# Patient Record
Sex: MALE | Race: WHITE | NOT HISPANIC OR LATINO | Employment: OTHER | URBAN - NONMETROPOLITAN AREA
[De-identification: names, ages, dates, MRNs, and addresses within clinical notes are randomized per-mention and may not be internally consistent; named-entity substitution may affect disease eponyms.]

---

## 2017-03-15 ENCOUNTER — PROCEDURE VISIT (OUTPATIENT)
Dept: OTOLARYNGOLOGY | Facility: CLINIC | Age: 77
End: 2017-03-15

## 2017-03-15 DIAGNOSIS — IMO0001 ASYMMETRICAL HEARING LOSS, RIGHT: Primary | ICD-10-CM

## 2017-03-15 PROCEDURE — HEARINGNOCHG: Performed by: AUDIOLOGIST

## 2017-03-15 NOTE — PROGRESS NOTES
Mr. Asif presented to the clinic this date for a BAHA fitting. He was fit with a right BAHA 5 [employer] replacement processor.

## 2017-05-11 ENCOUNTER — TRANSCRIBE ORDERS (OUTPATIENT)
Dept: ADMINISTRATIVE | Facility: HOSPITAL | Age: 77
End: 2017-05-11

## 2017-05-11 ENCOUNTER — HOSPITAL ENCOUNTER (OUTPATIENT)
Dept: GENERAL RADIOLOGY | Facility: HOSPITAL | Age: 77
Discharge: HOME | End: 2017-05-11
Attending: INTERNAL MEDICINE | Admitting: INTERNAL MEDICINE

## 2017-05-11 DIAGNOSIS — Z87.891 HISTORY OF TOBACCO USE: Primary | ICD-10-CM

## 2017-05-11 PROCEDURE — 71020 HC CHEST PA AND LATERAL: CPT

## 2017-06-15 LAB
ALBUMIN SERPL-MCNC: 4.2 G/DL (ref 3.5–5.2)
ALP BLD-CCNC: 88 U/L (ref 40–130)
ALT SERPL-CCNC: 12 U/L (ref 5–41)
ANION GAP SERPL CALCULATED.3IONS-SCNC: 16 MMOL/L (ref 7–19)
AST SERPL-CCNC: 21 U/L (ref 5–40)
BILIRUB SERPL-MCNC: 1.1 MG/DL (ref 0.2–1.2)
BUN BLDV-MCNC: 17 MG/DL (ref 8–23)
CALCIUM SERPL-MCNC: 9.4 MG/DL (ref 8.8–10.2)
CHLORIDE BLD-SCNC: 103 MMOL/L (ref 98–111)
CO2: 24 MMOL/L (ref 22–29)
CREAT SERPL-MCNC: 1.1 MG/DL (ref 0.5–1.2)
GFR NON-AFRICAN AMERICAN: >60
GLUCOSE BLD-MCNC: 91 MG/DL (ref 74–109)
HBA1C MFR BLD: 4.4 %
LDL CHOLESTEROL DIRECT: 52 MG/DL
POTASSIUM SERPL-SCNC: 3.9 MMOL/L (ref 3.5–5)
SODIUM BLD-SCNC: 143 MMOL/L (ref 136–145)
TOTAL PROTEIN: 7.4 G/DL (ref 6.6–8.7)
TSH SERPL DL<=0.05 MIU/L-ACNC: 0.75 UIU/ML (ref 0.27–4.2)

## 2017-06-19 RX ORDER — TIZANIDINE 2 MG/1
TABLET ORAL
Qty: 30 TABLET | Refills: 1 | Status: SHIPPED | OUTPATIENT
Start: 2017-06-19 | End: 2017-06-22

## 2017-06-21 RX ORDER — M-VIT,TX,IRON,MINS/CALC/FOLIC 27MG-0.4MG
1 TABLET ORAL DAILY
COMMUNITY
End: 2017-10-23

## 2017-06-21 RX ORDER — ACETAMINOPHEN AND CODEINE PHOSPHATE 300; 30 MG/1; MG/1
1 TABLET ORAL 3 TIMES DAILY PRN
COMMUNITY
End: 2017-06-22 | Stop reason: SDUPTHER

## 2017-06-21 RX ORDER — UREA 10 %
1 LOTION (ML) TOPICAL
COMMUNITY

## 2017-06-21 RX ORDER — LEVOTHYROXINE SODIUM 0.12 MG/1
TABLET ORAL
Refills: 3 | COMMUNITY
Start: 2017-06-02 | End: 2018-02-21 | Stop reason: SDUPTHER

## 2017-06-21 RX ORDER — SIMVASTATIN 20 MG
TABLET ORAL
Refills: 1 | COMMUNITY
Start: 2017-06-02 | End: 2017-08-31 | Stop reason: SDUPTHER

## 2017-06-22 ENCOUNTER — OFFICE VISIT (OUTPATIENT)
Dept: INTERNAL MEDICINE | Age: 77
End: 2017-06-22
Payer: MEDICARE

## 2017-06-22 VITALS
SYSTOLIC BLOOD PRESSURE: 116 MMHG | HEIGHT: 74 IN | OXYGEN SATURATION: 95 % | HEART RATE: 87 BPM | DIASTOLIC BLOOD PRESSURE: 66 MMHG | BODY MASS INDEX: 22.59 KG/M2 | WEIGHT: 176 LBS

## 2017-06-22 DIAGNOSIS — C73 THYROID CANCER (HCC): ICD-10-CM

## 2017-06-22 DIAGNOSIS — J43.9 PULMONARY EMPHYSEMA, UNSPECIFIED EMPHYSEMA TYPE (HCC): ICD-10-CM

## 2017-06-22 DIAGNOSIS — E89.0 HYPOTHYROIDISM, POSTOP: ICD-10-CM

## 2017-06-22 DIAGNOSIS — I25.10 CORONARY ARTERY DISEASE INVOLVING NATIVE CORONARY ARTERY OF NATIVE HEART WITHOUT ANGINA PECTORIS: ICD-10-CM

## 2017-06-22 DIAGNOSIS — D46.9 MYELODYSPLASIA (MYELODYSPLASTIC SYNDROME) (HCC): ICD-10-CM

## 2017-06-22 PROCEDURE — 99214 OFFICE O/P EST MOD 30 MIN: CPT | Performed by: INTERNAL MEDICINE

## 2017-06-22 RX ORDER — ACETAMINOPHEN AND CODEINE PHOSPHATE 300; 30 MG/1; MG/1
1 TABLET ORAL 3 TIMES DAILY PRN
Qty: 90 TABLET | Refills: 0 | Status: SHIPPED | OUTPATIENT
Start: 2017-06-22 | End: 2018-04-25 | Stop reason: SDUPTHER

## 2017-06-22 ASSESSMENT — PATIENT HEALTH QUESTIONNAIRE - PHQ9
SUM OF ALL RESPONSES TO PHQ9 QUESTIONS 1 & 2: 0
2. FEELING DOWN, DEPRESSED OR HOPELESS: 0
1. LITTLE INTEREST OR PLEASURE IN DOING THINGS: 0
SUM OF ALL RESPONSES TO PHQ QUESTIONS 1-9: 0

## 2017-06-22 ASSESSMENT — ENCOUNTER SYMPTOMS
TROUBLE SWALLOWING: 0
RHINORRHEA: 0
SORE THROAT: 0
SHORTNESS OF BREATH: 1
NAUSEA: 0
ABDOMINAL PAIN: 0
COUGH: 0

## 2017-09-12 ENCOUNTER — APPOINTMENT (OUTPATIENT)
Dept: PREADMISSION TESTING | Facility: HOSPITAL | Age: 77
End: 2017-09-12

## 2017-09-12 VITALS
BODY MASS INDEX: 24.08 KG/M2 | HEART RATE: 72 BPM | WEIGHT: 177.8 LBS | RESPIRATION RATE: 18 BRPM | DIASTOLIC BLOOD PRESSURE: 82 MMHG | TEMPERATURE: 98.7 F | HEIGHT: 72 IN | OXYGEN SATURATION: 96 % | SYSTOLIC BLOOD PRESSURE: 154 MMHG

## 2017-09-12 LAB
ANION GAP SERPL CALCULATED.3IONS-SCNC: 8 MMOL/L (ref 4–13)
BUN BLD-MCNC: 14 MG/DL (ref 5–21)
BUN/CREAT SERPL: 12.7 (ref 7–25)
CALCIUM SPEC-SCNC: 9.4 MG/DL (ref 8.4–10.4)
CHLORIDE SERPL-SCNC: 105 MMOL/L (ref 98–110)
CO2 SERPL-SCNC: 28 MMOL/L (ref 24–31)
CREAT BLD-MCNC: 1.1 MG/DL (ref 0.5–1.4)
DEPRECATED RDW RBC AUTO: 91.1 FL (ref 40–54)
ERYTHROCYTE [DISTWIDTH] IN BLOOD BY AUTOMATED COUNT: 22.7 % (ref 12–15)
GFR SERPL CREATININE-BSD FRML MDRD: 65 ML/MIN/1.73
GLUCOSE BLD-MCNC: 88 MG/DL (ref 70–100)
HCT VFR BLD AUTO: 32.8 % (ref 40–52)
HGB BLD-MCNC: 10.3 G/DL (ref 14–18)
MCH RBC QN AUTO: 34.8 PG (ref 28–32)
MCHC RBC AUTO-ENTMCNC: 31.4 G/DL (ref 33–36)
MCV RBC AUTO: 110.8 FL (ref 82–95)
PLATELET # BLD AUTO: 125 10*3/MM3 (ref 130–400)
POTASSIUM BLD-SCNC: 5.3 MMOL/L (ref 3.5–5.3)
RBC # BLD AUTO: 2.96 10*6/MM3 (ref 4.8–5.9)
SODIUM BLD-SCNC: 141 MMOL/L (ref 135–145)
WBC NRBC COR # BLD: 6.48 10*3/MM3 (ref 4.8–10.8)

## 2017-09-12 PROCEDURE — 80048 BASIC METABOLIC PNL TOTAL CA: CPT | Performed by: ANESTHESIOLOGY

## 2017-09-12 PROCEDURE — 85027 COMPLETE CBC AUTOMATED: CPT | Performed by: ANESTHESIOLOGY

## 2017-09-12 PROCEDURE — 36415 COLL VENOUS BLD VENIPUNCTURE: CPT

## 2017-09-12 PROCEDURE — 93010 ELECTROCARDIOGRAM REPORT: CPT | Performed by: INTERNAL MEDICINE

## 2017-09-12 PROCEDURE — 93005 ELECTROCARDIOGRAM TRACING: CPT

## 2017-09-12 RX ORDER — SIMVASTATIN 10 MG
10 TABLET ORAL NIGHTLY
COMMUNITY
End: 2018-11-30

## 2017-09-12 RX ORDER — ASPIRIN 81 MG/1
81 TABLET ORAL DAILY
COMMUNITY

## 2017-09-12 RX ORDER — LEVOTHYROXINE SODIUM 0.12 MG/1
125 TABLET ORAL DAILY
COMMUNITY

## 2017-09-12 NOTE — DISCHARGE INSTRUCTIONS
DAY OF SURGERY INSTRUCTIONS        YOUR SURGEON: Laly    PROCEDURE: cataract extraction     DATE OF SURGERY: 9-28-17    ARRIVAL TIME: AS DIRECTED BY OFFICE    DAY OF SURGERY TAKE ONLY THESE MEDICATIONS: NONE            BEFORE YOU COME TO THE HOSPITAL  (Pre-op instructions)  • Do not eat, drink, smoke or chew gum after midnight the night before surgery.  This also includes no mints.  • Morning of surgery take only the medicines you have been instructed with a sip of water unless otherwise instructed  by your physician.  • Do not shave, wear makeup or dark nail polish.  • Remove all jewelry including rings.  • Leave anything you consider valuable at home.  • Leave your suitcase in the car until after your surgery.  • Bring the following with you if applicable:  o Picture ID and insurance, Medicare or Medicaid cards  o Co-pay/deductible required by insurance (cash, check, credit card)  o Copy of advance directive, living will or power-of- documents if not brought to PAT  o CPAP or BIPAP mask and tubing  o Relaxation aids (MP3 player, book, magazine)  • On the day of surgery check in at registration located at the main entrance of the hospital.       Outpatient Surgery Guidelines, Adult  Outpatient procedures are those for which the person having the procedure is allowed to go home the same day as the procedure. Various procedures are done on an outpatient basis. You should follow some general guidelines if you will be having an outpatient procedure.  LET YOUR HEALTH CARE PROVIDER KNOW ABOUT:  · Any allergies you have.  · All medicines you are taking, including vitamins, herbs, eye drops, creams, and over-the-counter medicines.  · Previous problems you or members of your family have had with the use of anesthetics.  · Any blood disorders you have.  · Previous surgeries you have had.  · Medical conditions you have.  RISKS AND COMPLICATIONS  Your health care provider will discuss possible risks and  complications with you before surgery. Common risks and complications include:    · Problems due to the use of anesthetics.  · Blood loss and replacement (does not apply to minor surgical procedures).  · Temporary increase in pain due to surgery.  · Uncorrected pain or problems that the surgery was meant to correct.  · Infection.  · New damage.  BEFORE THE PROCEDURE  · Ask your health care provider about changing or stopping your regular medicines. You may need to stop taking certain medicines in the days or weeks before the procedure.  · Stop smoking at least 2 weeks before surgery. This lowers your risk for complications during and after surgery. Ask your health care provider for help with this if needed.  · Eat your usual meals and a light supper the day before surgery. Continue fluid intake. Do not drink alcohol.  · Do not eat or drink after midnight the night before your surgery.   · Arrange for someone to take you home and to stay with you for 24 hours after the procedure. Medicine given for your procedure may affect your ability to drive or to care for yourself.  · Call your health care provider's office if you develop an illness or problem that may prevent you from safely having your procedure.  AFTER THE PROCEDURE  After surgery, you will be taken to a recovery area, where your progress will be monitored. If there are no complications, you will be allowed to go home when you are awake, stable, and taking fluids well. You may have numbness around the surgical site. Healing will take some time. You will have tenderness at the surgical site and may have some swelling and bruising. You may also have some nausea.  HOME CARE INSTRUCTIONS  · Do not drive for 24 hours, or as directed by your health care provider. Do not drive while taking prescription pain medicines.  · Do not drink alcohol for 24 hours.  · Do not make important decisions or sign legal documents for 24 hours.  · You may resume a normal diet and  activities as directed.  · Do not lift anything heavier than 10 pounds (4.5 kg) or play contact sports until your health care provider says it is okay.  · Change your bandages (dressings) as directed.  · Only take over-the-counter or prescription medicines as directed by your health care provider.  · Follow up with your health care provider as directed.  SEEK MEDICAL CARE IF:  · You have increased bleeding (more than a small spot) from the surgical site.  · You have redness, swelling, or increasing pain in the wound.  · You see pus coming from the wound.  · You have a fever.  · You notice a bad smell coming from the wound or dressing.  · You feel lightheaded or faint.  · You develop a rash.  · You have trouble breathing.  · You develop allergies.  MAKE SURE YOU:  · Understand these instructions.  · Will watch your condition.  · Will get help right away if you are not doing well or get worse.     This information is not intended to replace advice given to you by your health care provider. Make sure you discuss any questions you have with your health care provider.     Document Released: 09/12/2002 Document Revised: 05/03/2016 Document Reviewed: 05/22/2014  [employer] Interactive Patient Education ©2016 [employer]       Fall Prevention in Hospitals, Adult  As a hospital patient, your condition and the treatments you receive can increase your risk for falls. Some additional risk factors for falls in a hospital include:  · Being in an unfamiliar environment.  · Being on bed rest.  · Your surgery.  · Taking certain medicines.  · Your tubing requirements, such as intravenous (IV) therapy or catheters.  It is important that you learn how to decrease fall risks while at the hospital. Below are important tips that can help prevent falls.  SAFETY TIPS FOR PREVENTING FALLS  Talk about your risk of falling.  · Ask your health care provider why you are at risk for falling. Is it your medicine, illness, tubing placement, or  something else?  · Make a plan with your health care provider to keep you safe from falls.  · Ask your health care provider or pharmacist about side effects of your medicines. Some medicines can make you dizzy or affect your coordination.  Ask for help.  · Ask for help before getting out of bed. You may need to press your call button.  · Ask for assistance in getting safely to the toilet.  · Ask for a walker or cane to be put at your bedside. Ask that most of the side rails on your bed be placed up before your health care provider leaves the room.  · Ask family or friends to sit with you.  · Ask for things that are out of your reach, such as your glasses, hearing aids, telephone, bedside table, or call button.  Follow these tips to avoid falling:  · Stay lying or seated, rather than standing, while waiting for help.  · Wear rubber-soled slippers or shoes whenever you walk in the hospital.  · Avoid quick, sudden movements.  ¨ Change positions slowly.  ¨ Sit on the side of your bed before standing.  ¨ Stand up slowly and wait before you start to walk.  · Let your health care provider know if there is a spill on the floor.  · Pay careful attention to the medical equipment, electrical cords, and tubes around you.  · When you need help, use your call button by your bed or in the bathroom. Wait for one of your health care providers to help you.  · If you feel dizzy or unsure of your footing, return to bed and wait for assistance.  · Avoid being distracted by the TV, telephone, or another person in your room.  · Do not lean or support yourself on rolling objects, such as IV poles or bedside tables.     This information is not intended to replace advice given to you by your health care provider. Make sure you discuss any questions you have with your health care provider.     Document Released: 12/15/2001 Document Revised: 01/08/2016 Document Reviewed: 08/25/2013  Elsevier Interactive Patient Education ©2016 [employer]vier  Inc.       Surgical Site Infections FAQs  What is a Surgical Site Infection (SSI)?  A surgical site infection is an infection that occurs after surgery in the part of the body where the surgery took place. Most patients who have surgery do not develop an infection. However, infections develop in about 1 to 3 out of every 100 patients who have surgery.  Some of the common symptoms of a surgical site infection are:  · Redness and pain around the area where you had surgery  · Drainage of cloudy fluid from your surgical wound  · Fever  Can SSIs be treated?  Yes. Most surgical site infections can be treated with antibiotics. The antibiotic given to you depends on the bacteria (germs) causing the infection. Sometimes patients with SSIs also need another surgery to treat the infection.  What are some of the things that hospitals are doing to prevent SSIs?  To prevent SSIs, doctors, nurses, and other healthcare providers:  · Clean their hands and arms up to their elbows with an antiseptic agent just before the surgery.  · Clean their hands with soap and water or an alcohol-based hand rub before and after caring for each patient.  · May remove some of your hair immediately before your surgery using electric clippers if the hair is in the same area where the procedure will occur. They should not shave you with a razor.  · Wear special hair covers, masks, gowns, and gloves during surgery to keep the surgery area clean.  · Give you antibiotics before your surgery starts. In most cases, you should get antibiotics within 60 minutes before the surgery starts and the antibiotics should be stopped within 24 hours after surgery.  · Clean the skin at the site of your surgery with a special soap that kills germs.  What can I do to help prevent SSIs?  Before your surgery:  · Tell your doctor about other medical problems you may have. Health problems such as allergies, diabetes, and obesity could affect your surgery and your  treatment.  · Quit smoking. Patients who smoke get more infections. Talk to your doctor about how you can quit before your surgery.  · Do not shave near where you will have surgery. Shaving with a razor can irritate your skin and make it easier to develop an infection.  At the time of your surgery:  · Speak up if someone tries to shave you with a razor before surgery. Ask why you need to be shaved and talk with your surgeon if you have any concerns.  · Ask if you will get antibiotics before surgery.  After your surgery:  · Make sure that your healthcare providers clean their hands before examining you, either with soap and water or an alcohol-based hand rub.  · If you do not see your providers clean their hands, please ask them to do so.  · Family and friends who visit you should not touch the surgical wound or dressings.  · Family and friends should clean their hands with soap and water or an alcohol-based hand rub before and after visiting you. If you do not see them clean their hands, ask them to clean their hands.  What do I need to do when I go home from the hospital?  · Before you go home, your doctor or nurse should explain everything you need to know about taking care of your wound. Make sure you understand how to care for your wound before you leave the hospital.  · Always clean your hands before and after caring for your wound.  · Before you go home, make sure you know who to contact if you have questions or problems after you get home.  · If you have any symptoms of an infection, such as redness and pain at the surgery site, drainage, or fever, call your doctor immediately.  If you have additional questions, please ask your doctor or nurse.  Developed and co-sponsored by The Society for Healthcare Epidemiology of [place] (SHEA); Infectious Diseases Society of [place] (IDSA); American Hospital Association; Association for Professionals in Infection Control and Epidemiology (APIC); Centers for Disease  Control and Prevention (CDC); and The Joint Commission.     This information is not intended to replace advice given to you by your health care provider. Make sure you discuss any questions you have with your health care provider.     Document Released: 12/23/2014 Document Revised: 01/08/2016 Document Reviewed: 03/02/2016  Elsevier Interactive Patient Education ©2016 Elsevier Inc.

## 2017-09-28 PROBLEM — D46.9 MDS (MYELODYSPLASTIC SYNDROME) (HCC): Status: ACTIVE | Noted: 2017-09-28

## 2017-09-29 ENCOUNTER — INFUSION (OUTPATIENT)
Dept: ONCOLOGY | Facility: HOSPITAL | Age: 77
End: 2017-09-29

## 2017-09-29 VITALS
WEIGHT: 176 LBS | SYSTOLIC BLOOD PRESSURE: 153 MMHG | OXYGEN SATURATION: 99 % | DIASTOLIC BLOOD PRESSURE: 80 MMHG | HEIGHT: 71 IN | BODY MASS INDEX: 24.64 KG/M2 | HEART RATE: 77 BPM | TEMPERATURE: 97.6 F | RESPIRATION RATE: 20 BRPM

## 2017-09-29 DIAGNOSIS — D46.9 MDS (MYELODYSPLASTIC SYNDROME) (HCC): Primary | ICD-10-CM

## 2017-09-29 PROCEDURE — 96372 THER/PROPH/DIAG INJ SC/IM: CPT

## 2017-09-29 PROCEDURE — 63510000001 EPOETIN ALFA PER 1000 UNITS: Performed by: INTERNAL MEDICINE

## 2017-09-29 RX ADMIN — ERYTHROPOIETIN 40000 UNITS: 40000 INJECTION, SOLUTION INTRAVENOUS; SUBCUTANEOUS at 12:29

## 2017-10-06 ENCOUNTER — INFUSION (OUTPATIENT)
Dept: ONCOLOGY | Facility: HOSPITAL | Age: 77
End: 2017-10-06

## 2017-10-06 ENCOUNTER — TRANSCRIBE ORDERS (OUTPATIENT)
Dept: ADMINISTRATIVE | Facility: HOSPITAL | Age: 77
End: 2017-10-06

## 2017-10-06 ENCOUNTER — APPOINTMENT (OUTPATIENT)
Dept: LAB | Facility: HOSPITAL | Age: 77
End: 2017-10-06

## 2017-10-06 VITALS
BODY MASS INDEX: 24.64 KG/M2 | DIASTOLIC BLOOD PRESSURE: 75 MMHG | RESPIRATION RATE: 18 BRPM | OXYGEN SATURATION: 98 % | WEIGHT: 176 LBS | HEART RATE: 79 BPM | TEMPERATURE: 97 F | HEIGHT: 71 IN | SYSTOLIC BLOOD PRESSURE: 148 MMHG

## 2017-10-06 DIAGNOSIS — D46.9 MDS (MYELODYSPLASTIC SYNDROME) (HCC): ICD-10-CM

## 2017-10-06 DIAGNOSIS — D53.9 MACROCYTIC ANEMIA: Primary | ICD-10-CM

## 2017-10-06 DIAGNOSIS — D46.9 MDS (MYELODYSPLASTIC SYNDROME) (HCC): Primary | ICD-10-CM

## 2017-10-06 LAB
ERYTHROCYTE [DISTWIDTH] IN BLOOD BY AUTOMATED COUNT: 23.8 % (ref 12–15)
HCT VFR BLD AUTO: 35.2 % (ref 40–52)
HGB BLD-MCNC: 11.4 G/DL (ref 14–18)
MCH RBC QN AUTO: 35.1 PG (ref 28–32)
MCHC RBC AUTO-ENTMCNC: 32.4 G/DL (ref 33–36)
MCV RBC AUTO: 108.3 FL (ref 82–95)
PLATELET # BLD AUTO: 132 10*3/MM3 (ref 130–400)
PMV BLD AUTO: 12.9 FL (ref 6–12)
RBC # BLD AUTO: 3.25 10*6/MM3 (ref 4.2–5.4)
WBC NRBC COR # BLD: 6.7 10*3/MM3 (ref 4.8–10.8)

## 2017-10-06 PROCEDURE — 85027 COMPLETE CBC AUTOMATED: CPT | Performed by: INTERNAL MEDICINE

## 2017-10-06 PROCEDURE — 96372 THER/PROPH/DIAG INJ SC/IM: CPT

## 2017-10-06 PROCEDURE — 36415 COLL VENOUS BLD VENIPUNCTURE: CPT

## 2017-10-06 PROCEDURE — 63510000001 EPOETIN ALFA PER 1000 UNITS: Performed by: INTERNAL MEDICINE

## 2017-10-06 RX ADMIN — ERYTHROPOIETIN 40000 UNITS: 40000 INJECTION, SOLUTION INTRAVENOUS; SUBCUTANEOUS at 13:30

## 2017-10-13 ENCOUNTER — APPOINTMENT (OUTPATIENT)
Dept: LAB | Facility: HOSPITAL | Age: 77
End: 2017-10-13

## 2017-10-13 ENCOUNTER — INFUSION (OUTPATIENT)
Dept: ONCOLOGY | Facility: HOSPITAL | Age: 77
End: 2017-10-13

## 2017-10-13 VITALS
SYSTOLIC BLOOD PRESSURE: 157 MMHG | HEART RATE: 75 BPM | DIASTOLIC BLOOD PRESSURE: 82 MMHG | TEMPERATURE: 96.8 F | HEIGHT: 71 IN | RESPIRATION RATE: 20 BRPM | WEIGHT: 178 LBS | BODY MASS INDEX: 24.92 KG/M2 | OXYGEN SATURATION: 98 %

## 2017-10-13 DIAGNOSIS — D46.9 MDS (MYELODYSPLASTIC SYNDROME) (HCC): ICD-10-CM

## 2017-10-13 DIAGNOSIS — D53.9 SIMPLE CHRONIC ANEMIA: Primary | ICD-10-CM

## 2017-10-13 LAB
AUTO MIXED CELLS #: 0.7 10*3/MM3 (ref 0.1–2.6)
AUTO MIXED CELLS %: 11.4 % (ref 0.1–24)
ERYTHROCYTE [DISTWIDTH] IN BLOOD BY AUTOMATED COUNT: 24 % (ref 12–15)
HCT VFR BLD AUTO: 32 % (ref 40–52)
HGB BLD-MCNC: 10.3 G/DL (ref 14–18)
LYMPHOCYTES # BLD AUTO: 2.8 10*3/MM3 (ref 0.8–7)
LYMPHOCYTES NFR BLD AUTO: 43.8 % (ref 15–45)
MCH RBC QN AUTO: 34.8 PG (ref 28–32)
MCHC RBC AUTO-ENTMCNC: 32.2 G/DL (ref 33–36)
MCV RBC AUTO: 108.1 FL (ref 82–95)
NEUTROPHILS # BLD AUTO: 3 10*3/MM3 (ref 1.5–8.3)
NEUTROPHILS NFR BLD AUTO: 44.8 % (ref 39–78)
PLATELET # BLD AUTO: 98 10*3/MM3 (ref 130–400)
RBC # BLD AUTO: 2.96 10*6/MM3 (ref 4.2–5.4)
WBC NRBC COR # BLD: 6.5 10*3/MM3 (ref 4.8–10.8)

## 2017-10-13 PROCEDURE — 96372 THER/PROPH/DIAG INJ SC/IM: CPT

## 2017-10-13 PROCEDURE — 63510000001 EPOETIN ALFA PER 1000 UNITS: Performed by: INTERNAL MEDICINE

## 2017-10-13 PROCEDURE — 85025 COMPLETE CBC W/AUTO DIFF WBC: CPT | Performed by: INTERNAL MEDICINE

## 2017-10-13 PROCEDURE — 36415 COLL VENOUS BLD VENIPUNCTURE: CPT

## 2017-10-13 RX ADMIN — ERYTHROPOIETIN 40000 UNITS: 40000 INJECTION, SOLUTION INTRAVENOUS; SUBCUTANEOUS at 13:28

## 2017-10-16 DIAGNOSIS — I25.10 CORONARY ARTERY DISEASE INVOLVING NATIVE CORONARY ARTERY OF NATIVE HEART WITHOUT ANGINA PECTORIS: ICD-10-CM

## 2017-10-16 DIAGNOSIS — C73 THYROID CANCER (HCC): ICD-10-CM

## 2017-10-16 DIAGNOSIS — J43.9 PULMONARY EMPHYSEMA, UNSPECIFIED EMPHYSEMA TYPE (HCC): ICD-10-CM

## 2017-10-16 DIAGNOSIS — D46.9 MYELODYSPLASIA (MYELODYSPLASTIC SYNDROME) (HCC): ICD-10-CM

## 2017-10-16 LAB
ALBUMIN SERPL-MCNC: 4 G/DL (ref 3.5–5.2)
ALP BLD-CCNC: 106 U/L (ref 40–130)
ALT SERPL-CCNC: 11 U/L (ref 5–41)
ANION GAP SERPL CALCULATED.3IONS-SCNC: 15 MMOL/L (ref 7–19)
AST SERPL-CCNC: 16 U/L (ref 5–40)
BILIRUB SERPL-MCNC: 0.7 MG/DL (ref 0.2–1.2)
BUN BLDV-MCNC: 14 MG/DL (ref 8–23)
CALCIUM SERPL-MCNC: 9.1 MG/DL (ref 8.8–10.2)
CHLORIDE BLD-SCNC: 100 MMOL/L (ref 98–111)
CO2: 26 MMOL/L (ref 22–29)
CREAT SERPL-MCNC: 1.1 MG/DL (ref 0.5–1.2)
GFR NON-AFRICAN AMERICAN: >60
GLUCOSE BLD-MCNC: 147 MG/DL (ref 74–109)
HCT VFR BLD CALC: 33.3 % (ref 42–52)
HEMOGLOBIN: 10.5 G/DL (ref 14–18)
MCH RBC QN AUTO: 35.2 PG (ref 27–31)
MCHC RBC AUTO-ENTMCNC: 31.5 G/DL (ref 33–37)
MCV RBC AUTO: 111.7 FL (ref 80–94)
PDW BLD-RTO: 22.5 % (ref 11.5–14.5)
PLATELET # BLD: 98 K/UL (ref 130–400)
POTASSIUM SERPL-SCNC: 3.8 MMOL/L (ref 3.5–5)
RBC # BLD: 2.98 M/UL (ref 4.7–6.1)
SODIUM BLD-SCNC: 141 MMOL/L (ref 136–145)
TOTAL PROTEIN: 7.2 G/DL (ref 6.6–8.7)
WBC # BLD: 5.3 K/UL (ref 4.8–10.8)

## 2017-10-20 ENCOUNTER — APPOINTMENT (OUTPATIENT)
Dept: LAB | Facility: HOSPITAL | Age: 77
End: 2017-10-20
Attending: INTERNAL MEDICINE

## 2017-10-20 ENCOUNTER — TRANSCRIBE ORDERS (OUTPATIENT)
Dept: ONCOLOGY | Facility: HOSPITAL | Age: 77
End: 2017-10-20

## 2017-10-20 ENCOUNTER — INFUSION (OUTPATIENT)
Dept: ONCOLOGY | Facility: HOSPITAL | Age: 77
End: 2017-10-20

## 2017-10-20 VITALS
HEIGHT: 71 IN | OXYGEN SATURATION: 98 % | SYSTOLIC BLOOD PRESSURE: 146 MMHG | HEART RATE: 79 BPM | WEIGHT: 178 LBS | DIASTOLIC BLOOD PRESSURE: 86 MMHG | TEMPERATURE: 98 F | BODY MASS INDEX: 24.92 KG/M2 | RESPIRATION RATE: 20 BRPM

## 2017-10-20 DIAGNOSIS — D46.9 MYELODYSPLASTIC SYNDROME (HCC): ICD-10-CM

## 2017-10-20 DIAGNOSIS — D64.9 ANEMIA, UNSPECIFIED TYPE: ICD-10-CM

## 2017-10-20 DIAGNOSIS — D53.9 ANEMIA, MACROCYTIC: Primary | ICD-10-CM

## 2017-10-20 DIAGNOSIS — D46.9 MDS (MYELODYSPLASTIC SYNDROME) (HCC): Primary | ICD-10-CM

## 2017-10-20 LAB
AUTO MIXED CELLS #: 0.6 10*3/MM3 (ref 0.1–2.6)
AUTO MIXED CELLS %: 12.1 % (ref 0.1–24)
ERYTHROCYTE [DISTWIDTH] IN BLOOD BY AUTOMATED COUNT: 23.1 % (ref 12–15)
HCT VFR BLD AUTO: 30.5 % (ref 40–52)
HGB BLD-MCNC: 9.7 G/DL (ref 14–18)
LYMPHOCYTES # BLD AUTO: 2 10*3/MM3 (ref 0.8–7)
LYMPHOCYTES NFR BLD AUTO: 40 % (ref 15–45)
MCH RBC QN AUTO: 34.5 PG (ref 28–32)
MCHC RBC AUTO-ENTMCNC: 31.8 G/DL (ref 33–36)
MCV RBC AUTO: 108.5 FL (ref 82–95)
NEUTROPHILS # BLD AUTO: 2.3 10*3/MM3 (ref 1.5–8.3)
NEUTROPHILS NFR BLD AUTO: 47.9 % (ref 39–78)
PLATELET # BLD AUTO: 98 10*3/MM3 (ref 130–400)
PMV BLD AUTO: 12.4 FL (ref 6–12)
RBC # BLD AUTO: 2.81 10*6/MM3 (ref 4.2–5.4)
WBC NRBC COR # BLD: 4.9 10*3/MM3 (ref 4.8–10.8)

## 2017-10-20 PROCEDURE — 96372 THER/PROPH/DIAG INJ SC/IM: CPT

## 2017-10-20 PROCEDURE — 63510000001 EPOETIN ALFA PER 1000 UNITS: Performed by: INTERNAL MEDICINE

## 2017-10-20 PROCEDURE — 85025 COMPLETE CBC W/AUTO DIFF WBC: CPT | Performed by: INTERNAL MEDICINE

## 2017-10-20 PROCEDURE — 36415 COLL VENOUS BLD VENIPUNCTURE: CPT

## 2017-10-20 RX ADMIN — ERYTHROPOIETIN 40000 UNITS: 40000 INJECTION, SOLUTION INTRAVENOUS; SUBCUTANEOUS at 13:55

## 2017-10-20 NOTE — PROGRESS NOTES
"  Subjective     HISTORY OF PRESENT ILLNESS:     History of Present Illness  ***    Past Medical History, Past Surgical History, Social History, Family History have been reviewed and are without significant changes except as mentioned.    Review of Systems   A comprehensive 14 point review of systems was performed and was negative except as mentioned.    Medications:  The current medication list was reviewed in the EMR    ALLERGIES:  No Known Allergies    Objective      Vitals:    10/20/17 1349   BP: 146/86   Pulse: 79   Resp: 20   Temp: 98 °F (36.7 °C)   TempSrc: Oral   SpO2: 98%   Weight: 178 lb (80.7 kg)   Height: 71\" (180.3 cm)   PainSc: 0-No pain     No flowsheet data found.    Physical Exam  ***    RECENT LABS:  Hematology WBC   Date Value Ref Range Status   10/20/2017 4.90 4.80 - 10.80 10*3/mm3 Final     RBC   Date Value Ref Range Status   10/20/2017 2.81 (L) 4.20 - 5.40 10*6/mm3 Final     Hemoglobin   Date Value Ref Range Status   10/20/2017 9.7 (L) 14.0 - 18.0 g/dL Final     Hematocrit   Date Value Ref Range Status   10/20/2017 30.5 (L) 40.0 - 52.0 % Final     Platelets   Date Value Ref Range Status   10/20/2017 98 (L) 130 - 400 10*3/mm3 Final              Assessment/Plan   ***                  10/20/2017      CC:          "

## 2017-10-20 NOTE — PROGRESS NOTES
Patient in clinic for CBC and possible procrit.  CBC reviewed w/patient and faxed to MD.  Explained to pt HGB not improving.  Patient v/u.  VICENTE Stack

## 2017-10-23 ENCOUNTER — OFFICE VISIT (OUTPATIENT)
Dept: INTERNAL MEDICINE | Age: 77
End: 2017-10-23
Payer: MEDICARE

## 2017-10-23 VITALS
BODY MASS INDEX: 24.64 KG/M2 | SYSTOLIC BLOOD PRESSURE: 140 MMHG | OXYGEN SATURATION: 94 % | DIASTOLIC BLOOD PRESSURE: 80 MMHG | WEIGHT: 176 LBS | HEIGHT: 71 IN | HEART RATE: 79 BPM

## 2017-10-23 DIAGNOSIS — E89.0 HYPOTHYROIDISM, POSTOP: Primary | ICD-10-CM

## 2017-10-23 DIAGNOSIS — J43.9 PULMONARY EMPHYSEMA, UNSPECIFIED EMPHYSEMA TYPE (HCC): ICD-10-CM

## 2017-10-23 DIAGNOSIS — R73.01 IFG (IMPAIRED FASTING GLUCOSE): ICD-10-CM

## 2017-10-23 DIAGNOSIS — Z91.81 RISK FOR FALLS: ICD-10-CM

## 2017-10-23 PROCEDURE — 99214 OFFICE O/P EST MOD 30 MIN: CPT | Performed by: INTERNAL MEDICINE

## 2017-10-23 PROCEDURE — 1123F ACP DISCUSS/DSCN MKR DOCD: CPT | Performed by: INTERNAL MEDICINE

## 2017-10-23 PROCEDURE — 1036F TOBACCO NON-USER: CPT | Performed by: INTERNAL MEDICINE

## 2017-10-23 PROCEDURE — 3023F SPIROM DOC REV: CPT | Performed by: INTERNAL MEDICINE

## 2017-10-23 PROCEDURE — G8420 CALC BMI NORM PARAMETERS: HCPCS | Performed by: INTERNAL MEDICINE

## 2017-10-23 PROCEDURE — G8598 ASA/ANTIPLAT THER USED: HCPCS | Performed by: INTERNAL MEDICINE

## 2017-10-23 PROCEDURE — G8926 SPIRO NO PERF OR DOC: HCPCS | Performed by: INTERNAL MEDICINE

## 2017-10-23 PROCEDURE — G8427 DOCREV CUR MEDS BY ELIG CLIN: HCPCS | Performed by: INTERNAL MEDICINE

## 2017-10-23 PROCEDURE — 4040F PNEUMOC VAC/ADMIN/RCVD: CPT | Performed by: INTERNAL MEDICINE

## 2017-10-23 PROCEDURE — G8484 FLU IMMUNIZE NO ADMIN: HCPCS | Performed by: INTERNAL MEDICINE

## 2017-10-23 ASSESSMENT — ENCOUNTER SYMPTOMS
COUGH: 0
RHINORRHEA: 0
ABDOMINAL PAIN: 0
SHORTNESS OF BREATH: 0
SORE THROAT: 0
TROUBLE SWALLOWING: 0
NAUSEA: 0

## 2017-10-23 NOTE — PROGRESS NOTES
mouth 3 times daily as needed for Pain 90 tablet 0    VENTOLIN  (90 Base) MCG/ACT inhaler INHALE 1 TO 2 PUFFS BY MOUTH EVERY 4 TO 6 HOURS AS NEEDED AND DIRECTED  11    levothyroxine (SYNTHROID) 125 MCG tablet TAKE 1 TABLET BY MOUTH DAILY  3    aspirin 81 MG tablet Take 81 mg by mouth daily      melatonin 1 MG tablet Take 1 mg by mouth Take 1, 2 or 3 mg nightly prn       No current facility-administered medications for this visit. Review of Systems   Constitutional: Negative for fatigue and fever. HENT: Negative for rhinorrhea, sore throat and trouble swallowing. Respiratory: Negative for cough and shortness of breath. Cardiovascular: Negative for chest pain and palpitations. Gastrointestinal: Negative for abdominal pain and nausea. Endocrine: Negative for cold intolerance and heat intolerance. Genitourinary: Negative for dysuria and frequency. Skin: Negative for rash and wound. Neurological: Negative for seizures and syncope. Psychiatric/Behavioral: Negative for behavioral problems and hallucinations. BP (!) 140/80   Pulse 79   Ht 5' 11\" (1.803 m)   Wt 176 lb (79.8 kg)   SpO2 94%   BMI 24.55 kg/m²   Physical Exam   Constitutional: He appears well-developed and well-nourished. HENT:   Head: Normocephalic and atraumatic. Eyes: Pupils are equal, round, and reactive to light. No scleral icterus. Neck: No JVD present. Cardiovascular: Regular rhythm. No murmur heard. Pulmonary/Chest: No respiratory distress. He exhibits no tenderness. Breath sounds are clear, decreased breath sounds but clear   Abdominal: He exhibits no mass. There is no tenderness. Musculoskeletal: He exhibits no edema or deformity. Lymphadenopathy:     He has no cervical adenopathy. Neurological: He is alert. No cranial nerve deficit. Skin: Skin is warm. No erythema.         Orders Only on 10/16/2017   Component Date Value Ref Range Status    WBC 10/16/2017 5.3  4.8 - 10.8 K/uL Final    RBC 10/16/2017 2.98* 4.70 - 6.10 M/uL Final    Hemoglobin 10/16/2017 10.5* 14.0 - 18.0 g/dL Final    Hematocrit 10/16/2017 33.3* 42.0 - 52.0 % Final    MCV 10/16/2017 111.7* 80.0 - 94.0 fL Final    MCH 10/16/2017 35.2* 27.0 - 31.0 pg Final    MCHC 10/16/2017 31.5* 33.0 - 37.0 g/dL Final    RDW 10/16/2017 22.5* 11.5 - 14.5 % Final    Platelets 80/49/4427 98* 130 - 400 K/uL Final    Sodium 10/16/2017 141  136 - 145 mmol/L Final    Potassium 10/16/2017 3.8  3.5 - 5.0 mmol/L Final    Chloride 10/16/2017 100  98 - 111 mmol/L Final    CO2 10/16/2017 26  22 - 29 mmol/L Final    Anion Gap 10/16/2017 15  7 - 19 mmol/L Final    Glucose 10/16/2017 147* 74 - 109 mg/dL Final    BUN 10/16/2017 14  8 - 23 mg/dL Final    CREATININE 10/16/2017 1.1  0.5 - 1.2 mg/dL Final    GFR Non- 10/16/2017 >60  >60 Final    Calcium 10/16/2017 9.1  8.8 - 10.2 mg/dL Final    Total Protein 10/16/2017 7.2  6.6 - 8.7 g/dL Final    Alb 10/16/2017 4.0  3.5 - 5.2 g/dL Final    Total Bilirubin 10/16/2017 0.7  0.2 - 1.2 mg/dL Final    Alkaline Phosphatase 10/16/2017 106  40 - 130 U/L Final    ALT 10/16/2017 11  5 - 41 U/L Final    AST 10/16/2017 16  5 - 40 U/L Final       1. Hypothyroidism, postop    2. Pulmonary emphysema, unspecified emphysema type ([address]    3. IFG (impaired fasting glucose)    4. Risk for falls         ASSESSMENT/PLAN  Doing fairly well overall. He will continue to see Dr. Mitchell Tapia for his myelodysplasia. Will continue same dosage of thyroid, TSH next visit. Pulmonary status seems much better, lungs are clear, has a fairly good exercise tolerance. Impaired fasting glucose: Continue avoid concentrated sweets, A1c next visit. Fall risk: He reports no falls recently. We have gone over plans, and includes handrails and all the steps, avoidance of throw rugs, grab bars in the bathroom.     Return visit 6 months, annual wellness visit, with a CBC, A1c, lipids, TSH    Orders Placed This Encounter Procedures    Comprehensive Metabolic Panel     Standing Status:   Future     Standing Expiration Date:   10/23/2018    Hemoglobin A1C     Standing Status:   Future     Standing Expiration Date:   10/23/2018    Lipid Panel     Standing Status:   Future     Standing Expiration Date:   10/23/2018     Order Specific Question:   Is Patient Fasting?/# of Hours     Answer:   15    TSH without Reflex     Standing Status:   Future     Standing Expiration Date:   10/23/2018    Microalbumin / Creatinine Urine Ratio     Standing Status:   Future     Standing Expiration Date:   10/23/2018

## 2017-10-23 NOTE — PATIENT INSTRUCTIONS
include spinach, carrots, peaches, and berries. · Keep carrots, celery, and other veggies handy for snacks. Buy fruit that is in season and store it where you can see it so that you will be tempted to eat it. · Cook dishes that have a lot of veggies in them, such as stir-fries and soups. Limit saturated and trans fat  · Read food labels, and try to avoid saturated and trans fats. They increase your risk of heart disease. Trans fat is found in many processed foods such as cookies and crackers. · Use olive or canola oil when you cook. Try cholesterol-lowering spreads, such as Benecol or Take Control. · Bake, broil, grill, or steam foods instead of frying them. · Choose lean meats instead of high-fat meats such as hot dogs and sausages. Cut off all visible fat when you prepare meat. · Eat fish, skinless poultry, and meat alternatives such as soy products instead of high-fat meats. Soy products, such as tofu, may be especially good for your heart. · Choose low-fat or fat-free milk and dairy products. Eat fish  · Eat at least two servings of fish a week. Certain fish, such as salmon and tuna, contain omega-3 fatty acids, which may help reduce your risk of heart attack. Eat foods high in fiber  · Eat a variety of grain products every day. Include whole-grain foods that have lots of fiber and nutrients. Examples of whole-grain foods include oats, whole wheat bread, and brown rice. · Buy whole-grain breads and cereals, instead of white bread or pastries. Limit salt and sodium  · Limit how much salt and sodium you eat to help lower your blood pressure. · Taste food before you salt it. Add only a little salt when you think you need it. With time, your taste buds will adjust to less salt. · Eat fewer snack items, fast foods, and other high-salt, processed foods. Check food labels for the amount of sodium in packaged foods.   · Choose low-sodium versions of canned goods (such as soups, vegetables, and beans). Limit sugar  · Limit drinks and foods with added sugar. These include candy, desserts, and soda pop. Limit alcohol  · Limit alcohol to no more than 2 drinks a day for men and 1 drink a day for women. Too much alcohol can cause health problems. When should you call for help? Watch closely for changes in your health, and be sure to contact your doctor if:  · You would like help planning heart-healthy meals. Where can you learn more? Go to https://[employer]. org and sign in to your [employer] account. Enter V137 in the [employer] box to learn more about \"Heart-Healthy Diet: Care Instructions. \"     If you do not have an account, please click on the \"Sign Up Now\" link. Current as of: April 3, 2017  Content Version: 11.3  © 5194-1566 [employer]. Care instructions adapted under license by [facility] ([facility]). If you have questions about a medical condition or this instruction, always ask your healthcare professional. [address] any warranty or liability for your use of this information.

## 2017-10-27 ENCOUNTER — APPOINTMENT (OUTPATIENT)
Dept: LAB | Facility: HOSPITAL | Age: 77
End: 2017-10-27
Attending: INTERNAL MEDICINE

## 2017-10-27 ENCOUNTER — INFUSION (OUTPATIENT)
Dept: ONCOLOGY | Facility: HOSPITAL | Age: 77
End: 2017-10-27

## 2017-10-27 ENCOUNTER — TRANSCRIBE ORDERS (OUTPATIENT)
Dept: ADMINISTRATIVE | Facility: HOSPITAL | Age: 77
End: 2017-10-27

## 2017-10-27 VITALS
WEIGHT: 175 LBS | HEART RATE: 74 BPM | TEMPERATURE: 98 F | SYSTOLIC BLOOD PRESSURE: 155 MMHG | BODY MASS INDEX: 24.5 KG/M2 | HEIGHT: 71 IN | OXYGEN SATURATION: 99 % | DIASTOLIC BLOOD PRESSURE: 87 MMHG | RESPIRATION RATE: 20 BRPM

## 2017-10-27 DIAGNOSIS — D53.9 ANEMIA, MACROCYTIC: Primary | ICD-10-CM

## 2017-10-27 DIAGNOSIS — D46.9 MYELODYSPLASTIC SYNDROME (HCC): ICD-10-CM

## 2017-10-27 DIAGNOSIS — D64.9 ANEMIA, UNSPECIFIED TYPE: ICD-10-CM

## 2017-10-27 DIAGNOSIS — D46.9 MDS (MYELODYSPLASTIC SYNDROME) (HCC): Primary | ICD-10-CM

## 2017-10-27 LAB
AUTO MIXED CELLS #: 0.8 10*3/MM3 (ref 0.1–2.6)
AUTO MIXED CELLS %: 11.5 % (ref 0.1–24)
ERYTHROCYTE [DISTWIDTH] IN BLOOD BY AUTOMATED COUNT: 24.6 % (ref 12–15)
HCT VFR BLD AUTO: 32.3 % (ref 40–52)
HGB BLD-MCNC: 10.4 G/DL (ref 14–18)
LYMPHOCYTES # BLD AUTO: 2.9 10*3/MM3 (ref 0.8–7)
LYMPHOCYTES NFR BLD AUTO: 42.6 % (ref 15–45)
MCH RBC QN AUTO: 34.6 PG (ref 28–32)
MCHC RBC AUTO-ENTMCNC: 32.2 G/DL (ref 33–36)
MCV RBC AUTO: 107.3 FL (ref 82–95)
NEUTROPHILS # BLD AUTO: 3.1 10*3/MM3 (ref 1.5–8.3)
NEUTROPHILS NFR BLD AUTO: 45.9 % (ref 39–78)
PLATELET # BLD AUTO: 114 10*3/MM3 (ref 130–400)
RBC # BLD AUTO: 3.01 10*6/MM3 (ref 4.2–5.4)
WBC NRBC COR # BLD: 6.8 10*3/MM3 (ref 4.8–10.8)

## 2017-10-27 PROCEDURE — 85025 COMPLETE CBC W/AUTO DIFF WBC: CPT | Performed by: INTERNAL MEDICINE

## 2017-10-27 PROCEDURE — 36415 COLL VENOUS BLD VENIPUNCTURE: CPT

## 2017-10-27 PROCEDURE — 63510000001 EPOETIN ALFA PER 1000 UNITS: Performed by: INTERNAL MEDICINE

## 2017-10-27 PROCEDURE — 96372 THER/PROPH/DIAG INJ SC/IM: CPT

## 2017-10-27 RX ADMIN — ERYTHROPOIETIN 40000 UNITS: 40000 INJECTION, SOLUTION INTRAVENOUS; SUBCUTANEOUS at 13:45

## 2017-11-03 ENCOUNTER — INFUSION (OUTPATIENT)
Dept: ONCOLOGY | Facility: HOSPITAL | Age: 77
End: 2017-11-03

## 2017-11-03 ENCOUNTER — TRANSCRIBE ORDERS (OUTPATIENT)
Dept: ADMINISTRATIVE | Facility: HOSPITAL | Age: 77
End: 2017-11-03

## 2017-11-03 ENCOUNTER — APPOINTMENT (OUTPATIENT)
Dept: LAB | Facility: HOSPITAL | Age: 77
End: 2017-11-03
Attending: INTERNAL MEDICINE

## 2017-11-03 VITALS
OXYGEN SATURATION: 99 % | SYSTOLIC BLOOD PRESSURE: 144 MMHG | DIASTOLIC BLOOD PRESSURE: 62 MMHG | HEIGHT: 71 IN | RESPIRATION RATE: 18 BRPM | BODY MASS INDEX: 24.5 KG/M2 | HEART RATE: 91 BPM | WEIGHT: 175 LBS | TEMPERATURE: 97.7 F

## 2017-11-03 DIAGNOSIS — D46.9 MDS (MYELODYSPLASTIC SYNDROME) (HCC): Primary | ICD-10-CM

## 2017-11-03 DIAGNOSIS — D46.A RCMD (REFRACTORY CYTOPENIA WITH MULTILINEAGE DYSPLASIA) (HCC): ICD-10-CM

## 2017-11-03 DIAGNOSIS — D53.9 ANEMIA, MACROCYTIC: Primary | ICD-10-CM

## 2017-11-03 DIAGNOSIS — D64.9 ANEMIA, UNSPECIFIED TYPE: ICD-10-CM

## 2017-11-03 LAB
ERYTHROCYTE [DISTWIDTH] IN BLOOD BY AUTOMATED COUNT: 23.1 % (ref 12–15)
HCT VFR BLD AUTO: 29.9 % (ref 40–52)
HGB BLD-MCNC: 9.7 G/DL (ref 14–18)
MCH RBC QN AUTO: 35.3 PG (ref 28–32)
MCHC RBC AUTO-ENTMCNC: 32.4 G/DL (ref 33–36)
MCV RBC AUTO: 108.7 FL (ref 82–95)
PLATELET # BLD AUTO: 97 10*3/MM3 (ref 130–400)
RBC # BLD AUTO: 2.75 10*6/MM3 (ref 4.2–5.4)
WBC NRBC COR # BLD: 5.3 10*3/MM3 (ref 4.8–10.8)

## 2017-11-03 PROCEDURE — 96372 THER/PROPH/DIAG INJ SC/IM: CPT

## 2017-11-03 PROCEDURE — 36415 COLL VENOUS BLD VENIPUNCTURE: CPT

## 2017-11-03 PROCEDURE — 63510000001 EPOETIN ALFA PER 1000 UNITS: Performed by: INTERNAL MEDICINE

## 2017-11-03 PROCEDURE — 85027 COMPLETE CBC AUTOMATED: CPT | Performed by: INTERNAL MEDICINE

## 2017-11-03 RX ADMIN — ERYTHROPOIETIN 40000 UNITS: 40000 INJECTION, SOLUTION INTRAVENOUS; SUBCUTANEOUS at 13:34

## 2017-11-10 ENCOUNTER — TRANSCRIBE ORDERS (OUTPATIENT)
Dept: ADMINISTRATIVE | Facility: HOSPITAL | Age: 77
End: 2017-11-10

## 2017-11-10 ENCOUNTER — APPOINTMENT (OUTPATIENT)
Dept: LAB | Facility: HOSPITAL | Age: 77
End: 2017-11-10
Attending: INTERNAL MEDICINE

## 2017-11-10 ENCOUNTER — INFUSION (OUTPATIENT)
Dept: ONCOLOGY | Facility: HOSPITAL | Age: 77
End: 2017-11-10

## 2017-11-10 VITALS
RESPIRATION RATE: 20 BRPM | DIASTOLIC BLOOD PRESSURE: 67 MMHG | BODY MASS INDEX: 24.5 KG/M2 | HEIGHT: 71 IN | HEART RATE: 87 BPM | OXYGEN SATURATION: 95 % | TEMPERATURE: 97.9 F | SYSTOLIC BLOOD PRESSURE: 153 MMHG | WEIGHT: 175 LBS

## 2017-11-10 DIAGNOSIS — D46.9 MDS (MYELODYSPLASTIC SYNDROME) (HCC): Primary | ICD-10-CM

## 2017-11-10 DIAGNOSIS — D46.9 MYELODYSPLASTIC SYNDROME (HCC): ICD-10-CM

## 2017-11-10 DIAGNOSIS — D53.9 MACROCYTIC ANEMIA: Primary | ICD-10-CM

## 2017-11-10 DIAGNOSIS — D64.9 ANEMIA, UNSPECIFIED TYPE: ICD-10-CM

## 2017-11-10 LAB
AUTO MIXED CELLS #: 0.6 10*3/MM3 (ref 0.1–2.6)
AUTO MIXED CELLS %: 11.3 % (ref 0.1–24)
ERYTHROCYTE [DISTWIDTH] IN BLOOD BY AUTOMATED COUNT: 23.6 % (ref 12–15)
HCT VFR BLD AUTO: 31.3 % (ref 40–52)
HGB BLD-MCNC: 10 G/DL (ref 14–18)
LYMPHOCYTES # BLD AUTO: 2.4 10*3/MM3 (ref 0.8–7)
LYMPHOCYTES NFR BLD AUTO: 42.8 % (ref 15–45)
MCH RBC QN AUTO: 34.2 PG (ref 28–32)
MCHC RBC AUTO-ENTMCNC: 31.9 G/DL (ref 33–36)
MCV RBC AUTO: 107.2 FL (ref 82–95)
NEUTROPHILS # BLD AUTO: 2.5 10*3/MM3 (ref 1.5–8.3)
NEUTROPHILS NFR BLD AUTO: 45.9 % (ref 39–78)
PLATELET # BLD AUTO: 131 10*3/MM3 (ref 130–400)
PMV BLD AUTO: 12.5 FL (ref 6–12)
RBC # BLD AUTO: 2.92 10*6/MM3 (ref 4.2–5.4)
WBC NRBC COR # BLD: 5.5 10*3/MM3 (ref 4.8–10.8)

## 2017-11-10 PROCEDURE — 96372 THER/PROPH/DIAG INJ SC/IM: CPT

## 2017-11-10 PROCEDURE — 36415 COLL VENOUS BLD VENIPUNCTURE: CPT

## 2017-11-10 PROCEDURE — 85025 COMPLETE CBC W/AUTO DIFF WBC: CPT | Performed by: INTERNAL MEDICINE

## 2017-11-10 PROCEDURE — 63510000001 EPOETIN ALFA PER 1000 UNITS: Performed by: INTERNAL MEDICINE

## 2017-11-10 RX ADMIN — ERYTHROPOIETIN 40000 UNITS: 40000 INJECTION, SOLUTION INTRAVENOUS; SUBCUTANEOUS at 13:21

## 2017-11-17 ENCOUNTER — APPOINTMENT (OUTPATIENT)
Dept: LAB | Facility: HOSPITAL | Age: 77
End: 2017-11-17
Attending: INTERNAL MEDICINE

## 2017-11-17 ENCOUNTER — INFUSION (OUTPATIENT)
Dept: ONCOLOGY | Facility: HOSPITAL | Age: 77
End: 2017-11-17

## 2017-11-17 VITALS
WEIGHT: 182 LBS | HEIGHT: 71 IN | SYSTOLIC BLOOD PRESSURE: 143 MMHG | HEART RATE: 86 BPM | RESPIRATION RATE: 20 BRPM | BODY MASS INDEX: 25.48 KG/M2 | DIASTOLIC BLOOD PRESSURE: 62 MMHG | OXYGEN SATURATION: 97 % | TEMPERATURE: 98 F

## 2017-11-17 DIAGNOSIS — D46.9 MDS (MYELODYSPLASTIC SYNDROME) (HCC): Primary | ICD-10-CM

## 2017-11-17 LAB
AUTO MIXED CELLS #: 0.7 10*3/MM3 (ref 0.1–2.6)
AUTO MIXED CELLS %: 14.6 % (ref 0.1–24)
ERYTHROCYTE [DISTWIDTH] IN BLOOD BY AUTOMATED COUNT: 24.3 % (ref 12–15)
HCT VFR BLD AUTO: 29.6 % (ref 40–52)
HGB BLD-MCNC: 9.5 G/DL (ref 14–18)
LYMPHOCYTES # BLD AUTO: 1.9 10*3/MM3 (ref 0.8–7)
LYMPHOCYTES NFR BLD AUTO: 38.1 % (ref 15–45)
MCH RBC QN AUTO: 34.8 PG (ref 28–32)
MCHC RBC AUTO-ENTMCNC: 32.1 G/DL (ref 33–36)
MCV RBC AUTO: 108.4 FL (ref 82–95)
NEUTROPHILS # BLD AUTO: 2.5 10*3/MM3 (ref 1.5–8.3)
NEUTROPHILS NFR BLD AUTO: 47.3 % (ref 39–78)
PLATELET # BLD AUTO: 119 10*3/MM3 (ref 130–400)
PMV BLD AUTO: 12.6 FL (ref 6–12)
RBC # BLD AUTO: 2.73 10*6/MM3 (ref 4.2–5.4)
WBC NRBC COR # BLD: 5.1 10*3/MM3 (ref 4.8–10.8)

## 2017-11-17 PROCEDURE — 85025 COMPLETE CBC W/AUTO DIFF WBC: CPT | Performed by: INTERNAL MEDICINE

## 2017-11-17 PROCEDURE — 36415 COLL VENOUS BLD VENIPUNCTURE: CPT

## 2017-11-17 PROCEDURE — 63510000001 EPOETIN ALFA PER 1000 UNITS: Performed by: INTERNAL MEDICINE

## 2017-11-17 PROCEDURE — 96372 THER/PROPH/DIAG INJ SC/IM: CPT

## 2017-11-17 RX ADMIN — ERYTHROPOIETIN 40000 UNITS: 40000 INJECTION, SOLUTION INTRAVENOUS; SUBCUTANEOUS at 13:21

## 2017-11-22 ENCOUNTER — TRANSCRIBE ORDERS (OUTPATIENT)
Dept: ADMINISTRATIVE | Facility: HOSPITAL | Age: 77
End: 2017-11-22

## 2017-11-22 DIAGNOSIS — D53.9 MACROCYTIC ANEMIA: Primary | ICD-10-CM

## 2017-11-22 DIAGNOSIS — D46.9 MYELODYSPLASTIC SYNDROME (HCC): ICD-10-CM

## 2017-11-22 DIAGNOSIS — D64.9 ANEMIA, UNSPECIFIED TYPE: ICD-10-CM

## 2017-11-27 ENCOUNTER — LAB (OUTPATIENT)
Dept: LAB | Facility: HOSPITAL | Age: 77
End: 2017-11-27
Attending: INTERNAL MEDICINE

## 2017-11-27 ENCOUNTER — INFUSION (OUTPATIENT)
Dept: ONCOLOGY | Facility: HOSPITAL | Age: 77
End: 2017-11-27

## 2017-11-27 VITALS
BODY MASS INDEX: 25.48 KG/M2 | WEIGHT: 182 LBS | HEIGHT: 71 IN | OXYGEN SATURATION: 99 % | HEART RATE: 81 BPM | TEMPERATURE: 97.8 F | RESPIRATION RATE: 20 BRPM | DIASTOLIC BLOOD PRESSURE: 70 MMHG | SYSTOLIC BLOOD PRESSURE: 125 MMHG

## 2017-11-27 DIAGNOSIS — D53.9 MACROCYTIC ANEMIA: ICD-10-CM

## 2017-11-27 DIAGNOSIS — D46.9 MYELODYSPLASTIC SYNDROME (HCC): ICD-10-CM

## 2017-11-27 DIAGNOSIS — D46.9 MDS (MYELODYSPLASTIC SYNDROME) (HCC): Primary | ICD-10-CM

## 2017-11-27 DIAGNOSIS — D64.9 ANEMIA, UNSPECIFIED TYPE: ICD-10-CM

## 2017-11-27 LAB
AUTO MIXED CELLS #: 0.6 10*3/MM3 (ref 0.1–2.6)
AUTO MIXED CELLS %: 11.7 % (ref 0.1–24)
ERYTHROCYTE [DISTWIDTH] IN BLOOD BY AUTOMATED COUNT: 23.8 % (ref 12–15)
HCT VFR BLD AUTO: 28.4 % (ref 40–52)
HGB BLD-MCNC: 9.1 G/DL (ref 14–18)
HOLD SPECIMEN: NORMAL
LYMPHOCYTES # BLD AUTO: 1.6 10*3/MM3 (ref 0.8–7)
LYMPHOCYTES NFR BLD AUTO: 32.2 % (ref 15–45)
MCH RBC QN AUTO: 34.2 PG (ref 28–32)
MCHC RBC AUTO-ENTMCNC: 32 G/DL (ref 33–36)
MCV RBC AUTO: 106.8 FL (ref 82–95)
NEUTROPHILS # BLD AUTO: 2.9 10*3/MM3 (ref 1.5–8.3)
NEUTROPHILS NFR BLD AUTO: 56.1 % (ref 39–78)
PLATELET # BLD AUTO: 109 10*3/MM3 (ref 130–400)
PMV BLD AUTO: 12.8 FL (ref 6–12)
RBC # BLD AUTO: 2.66 10*6/MM3 (ref 4.2–5.4)
WBC NRBC COR # BLD: 5.1 10*3/MM3 (ref 4.8–10.8)

## 2017-11-27 PROCEDURE — 36415 COLL VENOUS BLD VENIPUNCTURE: CPT

## 2017-11-27 PROCEDURE — 63510000001 EPOETIN ALFA PER 1000 UNITS: Performed by: INTERNAL MEDICINE

## 2017-11-27 PROCEDURE — 96372 THER/PROPH/DIAG INJ SC/IM: CPT

## 2017-11-27 PROCEDURE — 85025 COMPLETE CBC W/AUTO DIFF WBC: CPT

## 2017-11-27 RX ADMIN — ERYTHROPOIETIN 40000 UNITS: 40000 INJECTION, SOLUTION INTRAVENOUS; SUBCUTANEOUS at 14:57

## 2017-12-01 ENCOUNTER — APPOINTMENT (OUTPATIENT)
Dept: ONCOLOGY | Facility: HOSPITAL | Age: 77
End: 2017-12-01

## 2017-12-04 ENCOUNTER — TRANSCRIBE ORDERS (OUTPATIENT)
Dept: ADMINISTRATIVE | Facility: HOSPITAL | Age: 77
End: 2017-12-04

## 2017-12-04 DIAGNOSIS — D46.9 MDS (MYELODYSPLASTIC SYNDROME) (HCC): Primary | ICD-10-CM

## 2017-12-05 ENCOUNTER — LAB (OUTPATIENT)
Dept: LAB | Facility: HOSPITAL | Age: 77
End: 2017-12-05
Attending: INTERNAL MEDICINE

## 2017-12-05 ENCOUNTER — INFUSION (OUTPATIENT)
Dept: ONCOLOGY | Facility: HOSPITAL | Age: 77
End: 2017-12-05

## 2017-12-05 VITALS
TEMPERATURE: 97.5 F | HEIGHT: 71 IN | RESPIRATION RATE: 20 BRPM | OXYGEN SATURATION: 97 % | BODY MASS INDEX: 24.64 KG/M2 | HEART RATE: 108 BPM | SYSTOLIC BLOOD PRESSURE: 138 MMHG | DIASTOLIC BLOOD PRESSURE: 67 MMHG | WEIGHT: 176 LBS

## 2017-12-05 DIAGNOSIS — D46.9 MDS (MYELODYSPLASTIC SYNDROME) (HCC): Primary | ICD-10-CM

## 2017-12-05 DIAGNOSIS — D46.9 MDS (MYELODYSPLASTIC SYNDROME) (HCC): ICD-10-CM

## 2017-12-05 LAB
AUTO MIXED CELLS #: 0.8 10*3/MM3 (ref 0.1–2.6)
AUTO MIXED CELLS %: 12.6 % (ref 0.1–24)
ERYTHROCYTE [DISTWIDTH] IN BLOOD BY AUTOMATED COUNT: 23.3 % (ref 12–15)
HCT VFR BLD AUTO: 30.9 % (ref 40–52)
HGB BLD-MCNC: 9.9 G/DL (ref 14–18)
IRON 24H UR-MRATE: 69 MCG/DL (ref 42–180)
IRON SATN MFR SERPL: 31 % (ref 20–45)
LYMPHOCYTES # BLD AUTO: 2.2 10*3/MM3 (ref 0.8–7)
LYMPHOCYTES NFR BLD AUTO: 34 % (ref 15–45)
MCH RBC QN AUTO: 34.3 PG (ref 28–32)
MCHC RBC AUTO-ENTMCNC: 32 G/DL (ref 33–36)
MCV RBC AUTO: 106.9 FL (ref 82–95)
NEUTROPHILS # BLD AUTO: 3.5 10*3/MM3 (ref 1.5–8.3)
NEUTROPHILS NFR BLD AUTO: 53.4 % (ref 39–78)
PLATELET # BLD AUTO: 131 10*3/MM3 (ref 130–400)
PMV BLD AUTO: 11.5 FL (ref 6–12)
RBC # BLD AUTO: 2.89 10*6/MM3 (ref 4.2–5.4)
TIBC SERPL-MCNC: 225 MCG/DL (ref 225–420)
WBC NRBC COR # BLD: 6.5 10*3/MM3 (ref 4.8–10.8)

## 2017-12-05 PROCEDURE — 83550 IRON BINDING TEST: CPT | Performed by: INTERNAL MEDICINE

## 2017-12-05 PROCEDURE — 36415 COLL VENOUS BLD VENIPUNCTURE: CPT

## 2017-12-05 PROCEDURE — 85025 COMPLETE CBC W/AUTO DIFF WBC: CPT

## 2017-12-05 PROCEDURE — 83540 ASSAY OF IRON: CPT | Performed by: INTERNAL MEDICINE

## 2017-12-05 NOTE — PROGRESS NOTES
Patient reports to clinic for Procrit/CBC today.  Prior authorization has .  Office is unsure if auth will be available today.  Patient advised of situation and he has opted to go home and I will call him if the office receives the auth before the end of the day.  Patient v/adithya Stack RN    [id number]  Patient authorization still not available and patient did not receive injection today.  Patient will be contacted by the office when injection is approved.  Patient made aware via phone and abimbola Stack RN

## 2017-12-11 ENCOUNTER — TRANSCRIBE ORDERS (OUTPATIENT)
Dept: ADMINISTRATIVE | Facility: HOSPITAL | Age: 77
End: 2017-12-11

## 2017-12-11 DIAGNOSIS — D53.9 MACROCYTIC ANEMIA: ICD-10-CM

## 2017-12-11 DIAGNOSIS — D64.9 ANEMIA, UNSPECIFIED TYPE: Primary | ICD-10-CM

## 2017-12-11 DIAGNOSIS — D46.9 MYELODYSPLASTIC SYNDROME (HCC): ICD-10-CM

## 2017-12-12 ENCOUNTER — INFUSION (OUTPATIENT)
Dept: ONCOLOGY | Facility: HOSPITAL | Age: 77
End: 2017-12-12

## 2017-12-12 ENCOUNTER — LAB (OUTPATIENT)
Dept: LAB | Facility: HOSPITAL | Age: 77
End: 2017-12-12
Attending: INTERNAL MEDICINE

## 2017-12-12 VITALS
HEIGHT: 71 IN | RESPIRATION RATE: 18 BRPM | DIASTOLIC BLOOD PRESSURE: 57 MMHG | SYSTOLIC BLOOD PRESSURE: 137 MMHG | WEIGHT: 176 LBS | OXYGEN SATURATION: 99 % | TEMPERATURE: 97 F | HEART RATE: 84 BPM | BODY MASS INDEX: 24.64 KG/M2

## 2017-12-12 DIAGNOSIS — D46.9 MYELODYSPLASTIC SYNDROME (HCC): ICD-10-CM

## 2017-12-12 DIAGNOSIS — D53.9 MACROCYTIC ANEMIA: ICD-10-CM

## 2017-12-12 DIAGNOSIS — D46.9 MDS (MYELODYSPLASTIC SYNDROME) (HCC): Primary | ICD-10-CM

## 2017-12-12 DIAGNOSIS — D64.9 ANEMIA, UNSPECIFIED TYPE: ICD-10-CM

## 2017-12-12 LAB
AUTO MIXED CELLS #: 0.5 10*3/MM3 (ref 0.1–2.6)
AUTO MIXED CELLS %: 9.8 % (ref 0.1–24)
ERYTHROCYTE [DISTWIDTH] IN BLOOD BY AUTOMATED COUNT: 24.1 % (ref 12–15)
FERRITIN SERPL-MCNC: 819 NG/ML (ref 17.9–464)
HCT VFR BLD AUTO: 29.1 % (ref 40–52)
HGB BLD-MCNC: 9.2 G/DL (ref 14–18)
LYMPHOCYTES # BLD AUTO: 2.1 10*3/MM3 (ref 0.8–7)
LYMPHOCYTES NFR BLD AUTO: 39.8 % (ref 15–45)
MCH RBC QN AUTO: 33.8 PG (ref 28–32)
MCHC RBC AUTO-ENTMCNC: 31.6 G/DL (ref 33–36)
MCV RBC AUTO: 107 FL (ref 82–95)
NEUTROPHILS # BLD AUTO: 2.7 10*3/MM3 (ref 1.5–8.3)
NEUTROPHILS NFR BLD AUTO: 50.4 % (ref 39–78)
PLATELET # BLD AUTO: 126 10*3/MM3 (ref 130–400)
PMV BLD AUTO: 12.9 FL (ref 6–12)
RBC # BLD AUTO: 2.72 10*6/MM3 (ref 4.2–5.4)
WBC NRBC COR # BLD: 5.3 10*3/MM3 (ref 4.8–10.8)

## 2017-12-12 PROCEDURE — 96372 THER/PROPH/DIAG INJ SC/IM: CPT

## 2017-12-12 PROCEDURE — 63510000001 EPOETIN ALFA PER 1000 UNITS: Performed by: INTERNAL MEDICINE

## 2017-12-12 PROCEDURE — 36415 COLL VENOUS BLD VENIPUNCTURE: CPT

## 2017-12-12 PROCEDURE — 82728 ASSAY OF FERRITIN: CPT | Performed by: INTERNAL MEDICINE

## 2017-12-12 PROCEDURE — 85025 COMPLETE CBC W/AUTO DIFF WBC: CPT

## 2017-12-12 RX ADMIN — ERYTHROPOIETIN 40000 UNITS: 40000 INJECTION, SOLUTION INTRAVENOUS; SUBCUTANEOUS at 13:45

## 2017-12-18 ENCOUNTER — TRANSCRIBE ORDERS (OUTPATIENT)
Dept: ADMINISTRATIVE | Facility: HOSPITAL | Age: 77
End: 2017-12-18

## 2017-12-18 DIAGNOSIS — D53.9 MACROCYTIC ANEMIA: Primary | ICD-10-CM

## 2017-12-19 ENCOUNTER — APPOINTMENT (OUTPATIENT)
Dept: LAB | Facility: HOSPITAL | Age: 77
End: 2017-12-19
Attending: INTERNAL MEDICINE

## 2017-12-19 ENCOUNTER — LAB (OUTPATIENT)
Dept: LAB | Facility: HOSPITAL | Age: 77
End: 2017-12-19
Attending: INTERNAL MEDICINE

## 2017-12-19 ENCOUNTER — INFUSION (OUTPATIENT)
Dept: ONCOLOGY | Facility: HOSPITAL | Age: 77
End: 2017-12-19

## 2017-12-19 VITALS
OXYGEN SATURATION: 94 % | TEMPERATURE: 98 F | WEIGHT: 173 LBS | HEART RATE: 84 BPM | HEIGHT: 73 IN | BODY MASS INDEX: 22.93 KG/M2 | SYSTOLIC BLOOD PRESSURE: 146 MMHG | RESPIRATION RATE: 20 BRPM | DIASTOLIC BLOOD PRESSURE: 64 MMHG

## 2017-12-19 DIAGNOSIS — D53.9 MACROCYTIC ANEMIA: ICD-10-CM

## 2017-12-19 DIAGNOSIS — D46.9 MDS (MYELODYSPLASTIC SYNDROME) (HCC): Primary | ICD-10-CM

## 2017-12-19 LAB
AUTO MIXED CELLS #: 0.8 10*3/MM3 (ref 0.1–2.6)
AUTO MIXED CELLS %: 11.2 % (ref 0.1–24)
ERYTHROCYTE [DISTWIDTH] IN BLOOD BY AUTOMATED COUNT: 23.4 % (ref 12–15)
HCT VFR BLD AUTO: 29.6 % (ref 40–52)
HGB BLD-MCNC: 9.5 G/DL (ref 14–18)
LYMPHOCYTES # BLD AUTO: 2.6 10*3/MM3 (ref 0.8–7)
LYMPHOCYTES NFR BLD AUTO: 38.2 % (ref 15–45)
MCH RBC QN AUTO: 34.2 PG (ref 28–32)
MCHC RBC AUTO-ENTMCNC: 32.1 G/DL (ref 33–36)
MCV RBC AUTO: 106.5 FL (ref 82–95)
NEUTROPHILS # BLD AUTO: 3.5 10*3/MM3 (ref 1.5–8.3)
NEUTROPHILS NFR BLD AUTO: 50.6 % (ref 39–78)
PLATELET # BLD AUTO: 134 10*3/MM3 (ref 130–400)
RBC # BLD AUTO: 2.78 10*6/MM3 (ref 4.2–5.4)
WBC NRBC COR # BLD: 6.9 10*3/MM3 (ref 4.8–10.8)

## 2017-12-19 PROCEDURE — 96372 THER/PROPH/DIAG INJ SC/IM: CPT

## 2017-12-19 PROCEDURE — 85025 COMPLETE CBC W/AUTO DIFF WBC: CPT

## 2017-12-19 PROCEDURE — 36415 COLL VENOUS BLD VENIPUNCTURE: CPT

## 2017-12-19 PROCEDURE — 63510000001 EPOETIN ALFA PER 1000 UNITS: Performed by: INTERNAL MEDICINE

## 2017-12-19 RX ADMIN — ERYTHROPOIETIN 40000 UNITS: 40000 INJECTION, SOLUTION INTRAVENOUS; SUBCUTANEOUS at 13:33

## 2017-12-22 ENCOUNTER — TRANSCRIBE ORDERS (OUTPATIENT)
Dept: ADMINISTRATIVE | Facility: HOSPITAL | Age: 77
End: 2017-12-22

## 2017-12-22 DIAGNOSIS — D53.9 MACROCYTIC ANEMIA: Primary | ICD-10-CM

## 2017-12-26 ENCOUNTER — LAB (OUTPATIENT)
Dept: LAB | Facility: HOSPITAL | Age: 77
End: 2017-12-26
Attending: INTERNAL MEDICINE

## 2017-12-26 ENCOUNTER — APPOINTMENT (OUTPATIENT)
Dept: LAB | Facility: HOSPITAL | Age: 77
End: 2017-12-26
Attending: INTERNAL MEDICINE

## 2017-12-26 ENCOUNTER — INFUSION (OUTPATIENT)
Dept: ONCOLOGY | Facility: HOSPITAL | Age: 77
End: 2017-12-26

## 2017-12-26 VITALS
SYSTOLIC BLOOD PRESSURE: 156 MMHG | BODY MASS INDEX: 23.06 KG/M2 | OXYGEN SATURATION: 95 % | WEIGHT: 174 LBS | DIASTOLIC BLOOD PRESSURE: 76 MMHG | HEIGHT: 73 IN | TEMPERATURE: 97 F | RESPIRATION RATE: 20 BRPM | HEART RATE: 90 BPM

## 2017-12-26 DIAGNOSIS — D46.9 MDS (MYELODYSPLASTIC SYNDROME) (HCC): Primary | ICD-10-CM

## 2017-12-26 DIAGNOSIS — D53.9 MACROCYTIC ANEMIA: ICD-10-CM

## 2017-12-26 LAB
AUTO MIXED CELLS #: 0.9 10*3/MM3 (ref 0.1–2.6)
AUTO MIXED CELLS %: 11.9 % (ref 0.1–24)
ERYTHROCYTE [DISTWIDTH] IN BLOOD BY AUTOMATED COUNT: 22.6 % (ref 12–15)
HCT VFR BLD AUTO: 30.1 % (ref 40–52)
HGB BLD-MCNC: 9.6 G/DL (ref 14–18)
LYMPHOCYTES # BLD AUTO: 1.6 10*3/MM3 (ref 0.8–7)
LYMPHOCYTES NFR BLD AUTO: 22.1 % (ref 15–45)
MCH RBC QN AUTO: 33.7 PG (ref 28–32)
MCHC RBC AUTO-ENTMCNC: 31.9 G/DL (ref 33–36)
MCV RBC AUTO: 105.6 FL (ref 82–95)
NEUTROPHILS # BLD AUTO: 4.9 10*3/MM3 (ref 1.5–8.3)
NEUTROPHILS NFR BLD AUTO: 66 % (ref 39–78)
PLATELET # BLD AUTO: 119 10*3/MM3 (ref 130–400)
PMV BLD AUTO: 12.2 FL (ref 6–12)
RBC # BLD AUTO: 2.85 10*6/MM3 (ref 4.2–5.4)
WBC NRBC COR # BLD: 7.4 10*3/MM3 (ref 4.8–10.8)

## 2017-12-26 PROCEDURE — 63510000001 EPOETIN ALFA PER 1000 UNITS: Performed by: INTERNAL MEDICINE

## 2017-12-26 PROCEDURE — 36415 COLL VENOUS BLD VENIPUNCTURE: CPT

## 2017-12-26 PROCEDURE — 96372 THER/PROPH/DIAG INJ SC/IM: CPT

## 2017-12-26 PROCEDURE — 85025 COMPLETE CBC W/AUTO DIFF WBC: CPT

## 2017-12-26 RX ADMIN — ERYTHROPOIETIN 40000 UNITS: 40000 INJECTION, SOLUTION INTRAVENOUS; SUBCUTANEOUS at 13:18

## 2017-12-28 ENCOUNTER — TRANSCRIBE ORDERS (OUTPATIENT)
Dept: ADMINISTRATIVE | Facility: HOSPITAL | Age: 77
End: 2017-12-28

## 2017-12-28 DIAGNOSIS — D53.9 MACROCYTIC ANEMIA: Primary | ICD-10-CM

## 2018-01-02 ENCOUNTER — APPOINTMENT (OUTPATIENT)
Dept: LAB | Facility: HOSPITAL | Age: 78
End: 2018-01-02
Attending: INTERNAL MEDICINE

## 2018-01-02 ENCOUNTER — LAB (OUTPATIENT)
Dept: LAB | Facility: HOSPITAL | Age: 78
End: 2018-01-02
Attending: INTERNAL MEDICINE

## 2018-01-02 ENCOUNTER — INFUSION (OUTPATIENT)
Dept: ONCOLOGY | Facility: HOSPITAL | Age: 78
End: 2018-01-02

## 2018-01-02 VITALS
BODY MASS INDEX: 23.06 KG/M2 | HEART RATE: 87 BPM | HEIGHT: 73 IN | OXYGEN SATURATION: 95 % | RESPIRATION RATE: 20 BRPM | SYSTOLIC BLOOD PRESSURE: 177 MMHG | DIASTOLIC BLOOD PRESSURE: 81 MMHG | TEMPERATURE: 97.9 F | WEIGHT: 174 LBS

## 2018-01-02 DIAGNOSIS — D46.9 MDS (MYELODYSPLASTIC SYNDROME) (HCC): Primary | ICD-10-CM

## 2018-01-02 DIAGNOSIS — D53.9 MACROCYTIC ANEMIA: ICD-10-CM

## 2018-01-02 LAB
AUTO MIXED CELLS #: 0.6 10*3/MM3 (ref 0.1–2.6)
AUTO MIXED CELLS %: 10.6 % (ref 0.1–24)
ERYTHROCYTE [DISTWIDTH] IN BLOOD BY AUTOMATED COUNT: 23.5 % (ref 12–15)
HCT VFR BLD AUTO: 27.4 % (ref 40–52)
HGB BLD-MCNC: 8.8 G/DL (ref 14–18)
LYMPHOCYTES # BLD AUTO: 2.2 10*3/MM3 (ref 0.8–7)
LYMPHOCYTES NFR BLD AUTO: 39.9 % (ref 15–45)
MCH RBC QN AUTO: 33.8 PG (ref 28–32)
MCHC RBC AUTO-ENTMCNC: 32.1 G/DL (ref 33–36)
MCV RBC AUTO: 105.4 FL (ref 82–95)
NEUTROPHILS # BLD AUTO: 2.6 10*3/MM3 (ref 1.5–8.3)
NEUTROPHILS NFR BLD AUTO: 49.5 % (ref 39–78)
PLATELET # BLD AUTO: 125 10*3/MM3 (ref 130–400)
RBC # BLD AUTO: 2.6 10*6/MM3 (ref 4.2–5.4)
WBC NRBC COR # BLD: 5.4 10*3/MM3 (ref 4.8–10.8)

## 2018-01-02 PROCEDURE — 96372 THER/PROPH/DIAG INJ SC/IM: CPT

## 2018-01-02 PROCEDURE — 36415 COLL VENOUS BLD VENIPUNCTURE: CPT

## 2018-01-02 PROCEDURE — 63510000001 EPOETIN ALFA PER 1000 UNITS: Performed by: INTERNAL MEDICINE

## 2018-01-02 PROCEDURE — 85025 COMPLETE CBC W/AUTO DIFF WBC: CPT

## 2018-01-02 RX ADMIN — ERYTHROPOIETIN 40000 UNITS: 40000 INJECTION, SOLUTION INTRAVENOUS; SUBCUTANEOUS at 13:55

## 2018-01-02 NOTE — PROGRESS NOTES
Patient presents for CBC and procrit per [facility]'s orders.  CBC reviewed/procrit administered per orders.   Contacted [facility] An[facility] and they will send orders for monthly iron studies due next visit.  Patient tolerated well and discharged in stable condition.s  Labs faxed to [facility].  VICENTE Stack

## 2018-01-09 ENCOUNTER — TRANSCRIBE ORDERS (OUTPATIENT)
Dept: ADMINISTRATIVE | Facility: HOSPITAL | Age: 78
End: 2018-01-09

## 2018-01-09 ENCOUNTER — APPOINTMENT (OUTPATIENT)
Dept: LAB | Facility: HOSPITAL | Age: 78
End: 2018-01-09
Attending: INTERNAL MEDICINE

## 2018-01-09 ENCOUNTER — LAB (OUTPATIENT)
Dept: LAB | Facility: HOSPITAL | Age: 78
End: 2018-01-09
Attending: INTERNAL MEDICINE

## 2018-01-09 ENCOUNTER — INFUSION (OUTPATIENT)
Dept: ONCOLOGY | Facility: HOSPITAL | Age: 78
End: 2018-01-09

## 2018-01-09 VITALS
WEIGHT: 173 LBS | BODY MASS INDEX: 22.93 KG/M2 | OXYGEN SATURATION: 100 % | HEIGHT: 73 IN | TEMPERATURE: 97.8 F | DIASTOLIC BLOOD PRESSURE: 43 MMHG | SYSTOLIC BLOOD PRESSURE: 163 MMHG | RESPIRATION RATE: 20 BRPM | HEART RATE: 88 BPM

## 2018-01-09 DIAGNOSIS — D53.9 MACROCYTIC ANEMIA: Primary | ICD-10-CM

## 2018-01-09 DIAGNOSIS — D46.9 MDS (MYELODYSPLASTIC SYNDROME) (HCC): ICD-10-CM

## 2018-01-09 DIAGNOSIS — D46.9 MDS (MYELODYSPLASTIC SYNDROME) (HCC): Primary | ICD-10-CM

## 2018-01-09 LAB
ABO GROUP BLD: NORMAL
AUTO MIXED CELLS #: 0.9 10*3/MM3 (ref 0.1–2.6)
AUTO MIXED CELLS %: 14.7 % (ref 0.1–24)
BLD GP AB SCN SERPL QL: NEGATIVE
ERYTHROCYTE [DISTWIDTH] IN BLOOD BY AUTOMATED COUNT: 23.7 % (ref 12–15)
FERRITIN SERPL-MCNC: 1060 NG/ML (ref 17.9–464)
HCT VFR BLD AUTO: 23.3 % (ref 40–52)
HGB BLD-MCNC: 7.5 G/DL (ref 14–18)
IRON 24H UR-MRATE: 20 MCG/DL (ref 42–180)
IRON SATN MFR SERPL: 9 % (ref 20–45)
LYMPHOCYTES # BLD AUTO: 1.3 10*3/MM3 (ref 0.8–7)
LYMPHOCYTES NFR BLD AUTO: 22.6 % (ref 15–45)
MCH RBC QN AUTO: 33.8 PG (ref 28–32)
MCHC RBC AUTO-ENTMCNC: 32.2 G/DL (ref 33–36)
MCV RBC AUTO: 105 FL (ref 82–95)
NEUTROPHILS # BLD AUTO: 3.7 10*3/MM3 (ref 1.5–8.3)
NEUTROPHILS NFR BLD AUTO: 62.7 % (ref 39–78)
PLATELET # BLD AUTO: 120 10*3/MM3 (ref 130–400)
PMV BLD AUTO: 12.2 FL (ref 6–12)
RBC # BLD AUTO: 2.22 10*6/MM3 (ref 4.2–5.4)
RH BLD: POSITIVE
TIBC SERPL-MCNC: 219 MCG/DL (ref 225–420)
WBC NRBC COR # BLD: 5.9 10*3/MM3 (ref 4.8–10.8)

## 2018-01-09 PROCEDURE — 85025 COMPLETE CBC W/AUTO DIFF WBC: CPT | Performed by: INTERNAL MEDICINE

## 2018-01-09 PROCEDURE — 86850 RBC ANTIBODY SCREEN: CPT | Performed by: INTERNAL MEDICINE

## 2018-01-09 PROCEDURE — 86920 COMPATIBILITY TEST SPIN: CPT

## 2018-01-09 PROCEDURE — 36415 COLL VENOUS BLD VENIPUNCTURE: CPT

## 2018-01-09 PROCEDURE — 82728 ASSAY OF FERRITIN: CPT | Performed by: INTERNAL MEDICINE

## 2018-01-09 PROCEDURE — 63510000001 EPOETIN ALFA PER 1000 UNITS: Performed by: INTERNAL MEDICINE

## 2018-01-09 PROCEDURE — 86901 BLOOD TYPING SEROLOGIC RH(D): CPT | Performed by: INTERNAL MEDICINE

## 2018-01-09 PROCEDURE — 83540 ASSAY OF IRON: CPT | Performed by: INTERNAL MEDICINE

## 2018-01-09 PROCEDURE — 83550 IRON BINDING TEST: CPT | Performed by: INTERNAL MEDICINE

## 2018-01-09 PROCEDURE — 96372 THER/PROPH/DIAG INJ SC/IM: CPT

## 2018-01-09 PROCEDURE — 86900 BLOOD TYPING SEROLOGIC ABO: CPT | Performed by: INTERNAL MEDICINE

## 2018-01-09 RX ADMIN — ERYTHROPOIETIN 40000 UNITS: 40000 INJECTION, SOLUTION INTRAVENOUS; SUBCUTANEOUS at 14:03

## 2018-01-10 ENCOUNTER — INFUSION (OUTPATIENT)
Dept: ONCOLOGY | Facility: HOSPITAL | Age: 78
End: 2018-01-10
Attending: INTERNAL MEDICINE

## 2018-01-10 VITALS
RESPIRATION RATE: 18 BRPM | OXYGEN SATURATION: 99 % | TEMPERATURE: 98.4 F | DIASTOLIC BLOOD PRESSURE: 50 MMHG | SYSTOLIC BLOOD PRESSURE: 106 MMHG | HEART RATE: 80 BPM

## 2018-01-10 DIAGNOSIS — D46.9 MDS (MYELODYSPLASTIC SYNDROME) (HCC): ICD-10-CM

## 2018-01-10 PROCEDURE — 96376 TX/PRO/DX INJ SAME DRUG ADON: CPT

## 2018-01-10 PROCEDURE — 86900 BLOOD TYPING SEROLOGIC ABO: CPT

## 2018-01-10 PROCEDURE — P9016 RBC LEUKOCYTES REDUCED: HCPCS

## 2018-01-10 PROCEDURE — 25010000002 FUROSEMIDE PER 20 MG: Performed by: INTERNAL MEDICINE

## 2018-01-10 PROCEDURE — 96374 THER/PROPH/DIAG INJ IV PUSH: CPT

## 2018-01-10 PROCEDURE — 36430 TRANSFUSION BLD/BLD COMPNT: CPT

## 2018-01-10 RX ORDER — FUROSEMIDE 10 MG/ML
20 INJECTION INTRAMUSCULAR; INTRAVENOUS ONCE
Status: COMPLETED | OUTPATIENT
Start: 2018-01-10 | End: 2018-01-10

## 2018-01-10 RX ORDER — SODIUM CHLORIDE 9 MG/ML
250 INJECTION, SOLUTION INTRAVENOUS AS NEEDED
Status: DISCONTINUED | OUTPATIENT
Start: 2018-01-10 | End: 2018-01-10 | Stop reason: HOSPADM

## 2018-01-10 RX ADMIN — SODIUM CHLORIDE 250 ML: 9 INJECTION, SOLUTION INTRAVENOUS at 09:16

## 2018-01-10 RX ADMIN — FUROSEMIDE 20 MG: 10 INJECTION, SOLUTION INTRAMUSCULAR; INTRAVENOUS at 15:07

## 2018-01-10 RX ADMIN — FUROSEMIDE 20 MG: 10 INJECTION, SOLUTION INTRAMUSCULAR; INTRAVENOUS at 12:20

## 2018-01-11 LAB
ABO + RH BLD: NORMAL
ABO + RH BLD: NORMAL
BH BB BLOOD EXPIRATION DATE: NORMAL
BH BB BLOOD EXPIRATION DATE: NORMAL
BH BB BLOOD TYPE BARCODE: 5100
BH BB BLOOD TYPE BARCODE: 5100
BH BB DISPENSE STATUS: NORMAL
BH BB DISPENSE STATUS: NORMAL
BH BB PRODUCT CODE: NORMAL
BH BB PRODUCT CODE: NORMAL
BH BB UNIT NUMBER: NORMAL
BH BB UNIT NUMBER: NORMAL
CROSSMATCH INTERPRETATION: NORMAL
CROSSMATCH INTERPRETATION: NORMAL
UNIT  ABO: NORMAL
UNIT  ABO: NORMAL
UNIT  RH: NORMAL
UNIT  RH: NORMAL

## 2018-01-15 ENCOUNTER — TRANSCRIBE ORDERS (OUTPATIENT)
Dept: ADMINISTRATIVE | Facility: HOSPITAL | Age: 78
End: 2018-01-15

## 2018-01-15 DIAGNOSIS — D53.9 MACROCYTIC ANEMIA: Primary | ICD-10-CM

## 2018-01-16 ENCOUNTER — APPOINTMENT (OUTPATIENT)
Dept: LAB | Facility: HOSPITAL | Age: 78
End: 2018-01-16
Attending: INTERNAL MEDICINE

## 2018-01-16 ENCOUNTER — LAB (OUTPATIENT)
Dept: LAB | Facility: HOSPITAL | Age: 78
End: 2018-01-16
Attending: INTERNAL MEDICINE

## 2018-01-16 ENCOUNTER — INFUSION (OUTPATIENT)
Dept: ONCOLOGY | Facility: HOSPITAL | Age: 78
End: 2018-01-16

## 2018-01-16 VITALS
WEIGHT: 174 LBS | RESPIRATION RATE: 18 BRPM | DIASTOLIC BLOOD PRESSURE: 69 MMHG | TEMPERATURE: 98.3 F | HEART RATE: 84 BPM | BODY MASS INDEX: 23.06 KG/M2 | HEIGHT: 73 IN | OXYGEN SATURATION: 96 % | SYSTOLIC BLOOD PRESSURE: 146 MMHG

## 2018-01-16 DIAGNOSIS — D53.9 MACROCYTIC ANEMIA: ICD-10-CM

## 2018-01-16 DIAGNOSIS — D46.9 MDS (MYELODYSPLASTIC SYNDROME) (HCC): Primary | ICD-10-CM

## 2018-01-16 LAB
BASOPHILS # BLD AUTO: 0.01 10*3/MM3 (ref 0–0.2)
BASOPHILS NFR BLD AUTO: 0.2 % (ref 0–2)
DEPRECATED RDW RBC AUTO: 79.5 FL (ref 40–54)
EOSINOPHIL # BLD AUTO: 0.17 10*3/MM3 (ref 0–0.7)
EOSINOPHIL NFR BLD AUTO: 2.7 % (ref 0–4)
ERYTHROCYTE [DISTWIDTH] IN BLOOD BY AUTOMATED COUNT: 21 % (ref 12–15)
HCT VFR BLD AUTO: 34.3 % (ref 40–52)
HGB BLD-MCNC: 10.5 G/DL (ref 14–18)
IMM GRANULOCYTES # BLD: 0.04 10*3/MM3 (ref 0–0.03)
IMM GRANULOCYTES NFR BLD: 0.6 % (ref 0–5)
LYMPHOCYTES # BLD AUTO: 1.55 10*3/MM3 (ref 0.72–4.86)
LYMPHOCYTES NFR BLD AUTO: 24.9 % (ref 15–45)
MCH RBC QN AUTO: 32.4 PG (ref 28–32)
MCHC RBC AUTO-ENTMCNC: 30.6 G/DL (ref 33–36)
MCV RBC AUTO: 105.9 FL (ref 82–95)
MONOCYTES # BLD AUTO: 0.68 10*3/MM3 (ref 0.19–1.3)
MONOCYTES NFR BLD AUTO: 10.9 % (ref 4–12)
NEUTROPHILS # BLD AUTO: 3.78 10*3/MM3 (ref 1.87–8.4)
NEUTROPHILS NFR BLD AUTO: 60.7 % (ref 39–78)
NRBC BLD MANUAL-RTO: 0 /100 WBC (ref 0–0)
PLATELET # BLD AUTO: 182 10*3/MM3 (ref 130–400)
PMV BLD AUTO: 13.5 FL (ref 6–12)
RBC # BLD AUTO: 3.24 10*6/MM3 (ref 4.8–5.9)
WBC NRBC COR # BLD: 6.23 10*3/MM3 (ref 4.8–10.8)

## 2018-01-16 PROCEDURE — 96372 THER/PROPH/DIAG INJ SC/IM: CPT

## 2018-01-16 PROCEDURE — 85025 COMPLETE CBC W/AUTO DIFF WBC: CPT | Performed by: INTERNAL MEDICINE

## 2018-01-16 PROCEDURE — 36415 COLL VENOUS BLD VENIPUNCTURE: CPT

## 2018-01-16 PROCEDURE — 63510000001 EPOETIN ALFA PER 1000 UNITS: Performed by: INTERNAL MEDICINE

## 2018-01-16 RX ADMIN — ERYTHROPOIETIN 40000 UNITS: 40000 INJECTION, SOLUTION INTRAVENOUS; SUBCUTANEOUS at 12:42

## 2018-01-23 ENCOUNTER — APPOINTMENT (OUTPATIENT)
Dept: LAB | Facility: HOSPITAL | Age: 78
End: 2018-01-23
Attending: INTERNAL MEDICINE

## 2018-01-23 ENCOUNTER — TRANSCRIBE ORDERS (OUTPATIENT)
Dept: ADMINISTRATIVE | Facility: HOSPITAL | Age: 78
End: 2018-01-23

## 2018-01-23 ENCOUNTER — INFUSION (OUTPATIENT)
Dept: ONCOLOGY | Facility: HOSPITAL | Age: 78
End: 2018-01-23

## 2018-01-23 VITALS
DIASTOLIC BLOOD PRESSURE: 70 MMHG | OXYGEN SATURATION: 100 % | RESPIRATION RATE: 20 BRPM | TEMPERATURE: 96.3 F | BODY MASS INDEX: 23.94 KG/M2 | HEIGHT: 71 IN | WEIGHT: 171 LBS | SYSTOLIC BLOOD PRESSURE: 127 MMHG | HEART RATE: 82 BPM

## 2018-01-23 DIAGNOSIS — D53.9 SIMPLE CHRONIC ANEMIA: Primary | ICD-10-CM

## 2018-01-23 DIAGNOSIS — D46.9 MDS (MYELODYSPLASTIC SYNDROME) (HCC): Primary | ICD-10-CM

## 2018-01-23 LAB
AUTO MIXED CELLS #: 0.6 10*3/MM3 (ref 0.1–2.6)
AUTO MIXED CELLS %: 11.4 % (ref 0.1–24)
ERYTHROCYTE [DISTWIDTH] IN BLOOD BY AUTOMATED COUNT: 22.3 % (ref 12–15)
HCT VFR BLD AUTO: 31.9 % (ref 40–52)
HGB BLD-MCNC: 10.2 G/DL (ref 14–18)
LYMPHOCYTES # BLD AUTO: 1.6 10*3/MM3 (ref 0.8–7)
LYMPHOCYTES NFR BLD AUTO: 28.2 % (ref 15–45)
MCH RBC QN AUTO: 32.7 PG (ref 28–32)
MCHC RBC AUTO-ENTMCNC: 32 G/DL (ref 33–36)
MCV RBC AUTO: 102.2 FL (ref 82–95)
NEUTROPHILS # BLD AUTO: 3.4 10*3/MM3 (ref 1.5–8.3)
NEUTROPHILS NFR BLD AUTO: 60.4 % (ref 39–78)
PLATELET # BLD AUTO: 129 10*3/MM3 (ref 130–400)
RBC # BLD AUTO: 3.12 10*6/MM3 (ref 4.2–5.4)
WBC NRBC COR # BLD: 5.6 10*3/MM3 (ref 4.8–10.8)

## 2018-01-23 PROCEDURE — 36415 COLL VENOUS BLD VENIPUNCTURE: CPT

## 2018-01-23 PROCEDURE — 85025 COMPLETE CBC W/AUTO DIFF WBC: CPT | Performed by: INTERNAL MEDICINE

## 2018-01-23 PROCEDURE — 63510000001 EPOETIN ALFA PER 1000 UNITS: Performed by: INTERNAL MEDICINE

## 2018-01-23 PROCEDURE — 96372 THER/PROPH/DIAG INJ SC/IM: CPT

## 2018-01-23 RX ADMIN — ERYTHROPOIETIN 40000 UNITS: 40000 INJECTION, SOLUTION INTRAVENOUS; SUBCUTANEOUS at 13:29

## 2018-01-23 NOTE — PROGRESS NOTES
Patient presents to clinic for CBC and possible procrit.  CBC reviewed w/patient, Procrit inidicated for today.  Patient tolerated well and discharged in stable condition.  Labs forwarded to Dr. Martinez.  Patient states he sees him at that time.  VICENTE Stack

## 2018-01-29 ENCOUNTER — TRANSCRIBE ORDERS (OUTPATIENT)
Dept: ADMINISTRATIVE | Facility: HOSPITAL | Age: 78
End: 2018-01-29

## 2018-01-29 DIAGNOSIS — D53.9 MACROCYTIC ANEMIA: Primary | ICD-10-CM

## 2018-01-30 ENCOUNTER — APPOINTMENT (OUTPATIENT)
Dept: LAB | Facility: HOSPITAL | Age: 78
End: 2018-01-30
Attending: INTERNAL MEDICINE

## 2018-01-30 ENCOUNTER — TRANSCRIBE ORDERS (OUTPATIENT)
Dept: ADMINISTRATIVE | Facility: HOSPITAL | Age: 78
End: 2018-01-30

## 2018-01-30 ENCOUNTER — INFUSION (OUTPATIENT)
Dept: ONCOLOGY | Facility: HOSPITAL | Age: 78
End: 2018-01-30

## 2018-01-30 VITALS
HEIGHT: 71 IN | DIASTOLIC BLOOD PRESSURE: 66 MMHG | BODY MASS INDEX: 24.64 KG/M2 | RESPIRATION RATE: 16 BRPM | OXYGEN SATURATION: 100 % | HEART RATE: 76 BPM | SYSTOLIC BLOOD PRESSURE: 145 MMHG | WEIGHT: 176 LBS | TEMPERATURE: 97.1 F

## 2018-01-30 DIAGNOSIS — D53.9 MACROCYTIC ANEMIA: Primary | ICD-10-CM

## 2018-01-30 DIAGNOSIS — D46.9 MDS (MYELODYSPLASTIC SYNDROME) (HCC): Primary | ICD-10-CM

## 2018-01-30 LAB
ALBUMIN SERPL-MCNC: 3.7 G/DL (ref 3.5–5)
ALBUMIN/GLOB SERPL: 1.1 G/DL (ref 1.1–2.5)
ALP SERPL-CCNC: 106 U/L (ref 24–120)
ALT SERPL W P-5'-P-CCNC: 22 U/L (ref 0–54)
ANION GAP SERPL CALCULATED.3IONS-SCNC: 11 MMOL/L (ref 4–13)
AST SERPL-CCNC: 21 U/L (ref 7–45)
AUTO MIXED CELLS #: 0.7 10*3/MM3 (ref 0.1–2.6)
AUTO MIXED CELLS %: 12.4 % (ref 0.1–24)
BILIRUB SERPL-MCNC: 0.8 MG/DL (ref 0.1–1)
BUN BLD-MCNC: 12 MG/DL (ref 5–21)
BUN/CREAT SERPL: 11.7
CALCIUM SPEC-SCNC: 9 MG/DL (ref 8.4–10.4)
CHLORIDE SERPL-SCNC: 101 MMOL/L (ref 98–110)
CO2 SERPL-SCNC: 30 MMOL/L (ref 24–31)
CREAT BLD-MCNC: 1.03 MG/DL (ref 0.5–1.4)
ERYTHROCYTE [DISTWIDTH] IN BLOOD BY AUTOMATED COUNT: 21.5 % (ref 12–15)
FERRITIN SERPL-MCNC: 808 NG/ML (ref 17.9–464)
GFR SERPL CREATININE-BSD FRML MDRD: 70 ML/MIN/1.73
GLOBULIN UR ELPH-MCNC: 3.5 GM/DL
GLUCOSE BLD-MCNC: 85 MG/DL (ref 70–100)
HCT VFR BLD AUTO: 30.7 % (ref 40–52)
HGB BLD-MCNC: 9.8 G/DL (ref 14–18)
IRON 24H UR-MRATE: 77 MCG/DL (ref 42–180)
IRON SATN MFR SERPL: 36 % (ref 20–45)
LYMPHOCYTES # BLD AUTO: 1.5 10*3/MM3 (ref 0.8–7)
LYMPHOCYTES NFR BLD AUTO: 27.7 % (ref 15–45)
MCH RBC QN AUTO: 32.3 PG (ref 28–32)
MCHC RBC AUTO-ENTMCNC: 31.9 G/DL (ref 33–36)
MCV RBC AUTO: 101.3 FL (ref 82–95)
NEUTROPHILS # BLD AUTO: 3.3 10*3/MM3 (ref 1.5–8.3)
NEUTROPHILS NFR BLD AUTO: 59.9 % (ref 39–78)
PLATELET # BLD AUTO: 141 10*3/MM3 (ref 130–400)
PMV BLD AUTO: 11.9 FL (ref 6–12)
POTASSIUM BLD-SCNC: 4 MMOL/L (ref 3.5–5.3)
PROT SERPL-MCNC: 7.2 G/DL (ref 6.3–8.7)
RBC # BLD AUTO: 3.03 10*6/MM3 (ref 4.2–5.4)
SODIUM BLD-SCNC: 142 MMOL/L (ref 135–145)
TIBC SERPL-MCNC: 213 MCG/DL (ref 225–420)
WBC NRBC COR # BLD: 5.5 10*3/MM3 (ref 4.8–10.8)

## 2018-01-30 PROCEDURE — 63510000001 EPOETIN ALFA PER 1000 UNITS: Performed by: INTERNAL MEDICINE

## 2018-01-30 PROCEDURE — 36415 COLL VENOUS BLD VENIPUNCTURE: CPT

## 2018-01-30 PROCEDURE — 83550 IRON BINDING TEST: CPT | Performed by: INTERNAL MEDICINE

## 2018-01-30 PROCEDURE — 83540 ASSAY OF IRON: CPT | Performed by: INTERNAL MEDICINE

## 2018-01-30 PROCEDURE — 82728 ASSAY OF FERRITIN: CPT | Performed by: INTERNAL MEDICINE

## 2018-01-30 PROCEDURE — 80053 COMPREHEN METABOLIC PANEL: CPT | Performed by: INTERNAL MEDICINE

## 2018-01-30 PROCEDURE — 85025 COMPLETE CBC W/AUTO DIFF WBC: CPT | Performed by: INTERNAL MEDICINE

## 2018-01-30 PROCEDURE — 96372 THER/PROPH/DIAG INJ SC/IM: CPT

## 2018-01-30 RX ADMIN — ERYTHROPOIETIN 40000 UNITS: 40000 INJECTION, SOLUTION INTRAVENOUS; SUBCUTANEOUS at 13:50

## 2018-02-06 ENCOUNTER — APPOINTMENT (OUTPATIENT)
Dept: LAB | Facility: HOSPITAL | Age: 78
End: 2018-02-06
Attending: INTERNAL MEDICINE

## 2018-02-06 ENCOUNTER — TRANSCRIBE ORDERS (OUTPATIENT)
Dept: ADMINISTRATIVE | Facility: HOSPITAL | Age: 78
End: 2018-02-06

## 2018-02-06 ENCOUNTER — INFUSION (OUTPATIENT)
Dept: ONCOLOGY | Facility: HOSPITAL | Age: 78
End: 2018-02-06

## 2018-02-06 DIAGNOSIS — D46.9 MDS (MYELODYSPLASTIC SYNDROME) (HCC): Primary | ICD-10-CM

## 2018-02-06 DIAGNOSIS — D53.9 MACROCYTIC ANEMIA: Primary | ICD-10-CM

## 2018-02-06 LAB
ACANTHOCYTES BLD QL SMEAR: NORMAL
ANISOCYTOSIS BLD QL: NORMAL
BASOPHILS # BLD AUTO: 0.02 10*3/MM3 (ref 0–0.2)
BASOPHILS NFR BLD AUTO: 0.5 % (ref 0–2)
DEPRECATED RDW RBC AUTO: 81.1 FL (ref 40–54)
EOSINOPHIL # BLD AUTO: 0.11 10*3/MM3 (ref 0–0.7)
EOSINOPHIL NFR BLD AUTO: 2.5 % (ref 0–4)
ERYTHROCYTE [DISTWIDTH] IN BLOOD BY AUTOMATED COUNT: 21.8 % (ref 12–15)
HCT VFR BLD AUTO: 29.4 % (ref 40–52)
HGB BLD-MCNC: 9.2 G/DL (ref 14–18)
HYPOCHROMIA BLD QL: NORMAL
IMM GRANULOCYTES # BLD: 0.02 10*3/MM3 (ref 0–0.03)
IMM GRANULOCYTES NFR BLD: 0.5 % (ref 0–5)
LYMPHOCYTES # BLD AUTO: 1.63 10*3/MM3 (ref 0.72–4.86)
LYMPHOCYTES NFR BLD AUTO: 36.9 % (ref 15–45)
MACROCYTES BLD QL SMEAR: NORMAL
MCH RBC QN AUTO: 32.2 PG (ref 28–32)
MCHC RBC AUTO-ENTMCNC: 31.3 G/DL (ref 33–36)
MCV RBC AUTO: 102.8 FL (ref 82–95)
MONOCYTES # BLD AUTO: 0.47 10*3/MM3 (ref 0.19–1.3)
MONOCYTES NFR BLD AUTO: 10.6 % (ref 4–12)
NEUTROPHILS # BLD AUTO: 2.17 10*3/MM3 (ref 1.87–8.4)
NEUTROPHILS NFR BLD AUTO: 49 % (ref 39–78)
NRBC BLD MANUAL-RTO: 0.9 /100 WBC (ref 0–0)
PLATELET # BLD AUTO: 113 10*3/MM3 (ref 130–400)
PMV BLD AUTO: 13.8 FL (ref 6–12)
POIKILOCYTOSIS BLD QL SMEAR: NORMAL
RBC # BLD AUTO: 2.86 10*6/MM3 (ref 4.8–5.9)
SMALL PLATELETS BLD QL SMEAR: ADEQUATE
TARGETS BLD QL SMEAR: NORMAL
WBC MORPH BLD: NORMAL
WBC NRBC COR # BLD: 4.42 10*3/MM3 (ref 4.8–10.8)

## 2018-02-06 PROCEDURE — 36415 COLL VENOUS BLD VENIPUNCTURE: CPT

## 2018-02-06 PROCEDURE — 63510000001 EPOETIN ALFA PER 1000 UNITS: Performed by: INTERNAL MEDICINE

## 2018-02-06 PROCEDURE — 96372 THER/PROPH/DIAG INJ SC/IM: CPT

## 2018-02-06 PROCEDURE — 85025 COMPLETE CBC W/AUTO DIFF WBC: CPT | Performed by: INTERNAL MEDICINE

## 2018-02-06 PROCEDURE — 85007 BL SMEAR W/DIFF WBC COUNT: CPT | Performed by: INTERNAL MEDICINE

## 2018-02-06 RX ADMIN — ERYTHROPOIETIN 40000 UNITS: 40000 INJECTION, SOLUTION INTRAVENOUS; SUBCUTANEOUS at 14:05

## 2018-02-09 ENCOUNTER — TRANSCRIBE ORDERS (OUTPATIENT)
Dept: ADMINISTRATIVE | Facility: HOSPITAL | Age: 78
End: 2018-02-09

## 2018-02-09 DIAGNOSIS — D53.9 MACROCYTIC ANEMIA: Primary | ICD-10-CM

## 2018-02-13 ENCOUNTER — INFUSION (OUTPATIENT)
Dept: ONCOLOGY | Facility: HOSPITAL | Age: 78
End: 2018-02-13
Attending: INTERNAL MEDICINE

## 2018-02-13 ENCOUNTER — LAB (OUTPATIENT)
Dept: LAB | Facility: HOSPITAL | Age: 78
End: 2018-02-13
Attending: INTERNAL MEDICINE

## 2018-02-13 VITALS
DIASTOLIC BLOOD PRESSURE: 60 MMHG | WEIGHT: 170 LBS | BODY MASS INDEX: 23.8 KG/M2 | RESPIRATION RATE: 16 BRPM | SYSTOLIC BLOOD PRESSURE: 125 MMHG | HEIGHT: 71 IN | OXYGEN SATURATION: 99 % | HEART RATE: 82 BPM | TEMPERATURE: 97.5 F

## 2018-02-13 DIAGNOSIS — D46.9 MDS (MYELODYSPLASTIC SYNDROME) (HCC): Primary | ICD-10-CM

## 2018-02-13 DIAGNOSIS — D53.9 MACROCYTIC ANEMIA: ICD-10-CM

## 2018-02-13 LAB
BASOPHILS # BLD AUTO: 0.03 10*3/MM3 (ref 0–0.2)
BASOPHILS NFR BLD AUTO: 0.6 % (ref 0–2)
DEPRECATED RDW RBC AUTO: 82.3 FL (ref 40–54)
EOSINOPHIL # BLD AUTO: 0.15 10*3/MM3 (ref 0–0.7)
EOSINOPHIL NFR BLD AUTO: 2.8 % (ref 0–4)
ERYTHROCYTE [DISTWIDTH] IN BLOOD BY AUTOMATED COUNT: 22.6 % (ref 12–15)
HCT VFR BLD AUTO: 29.5 % (ref 40–52)
HGB BLD-MCNC: 9.3 G/DL (ref 14–18)
IMM GRANULOCYTES # BLD: 0.02 10*3/MM3 (ref 0–0.03)
IMM GRANULOCYTES NFR BLD: 0.4 % (ref 0–5)
LYMPHOCYTES # BLD AUTO: 2 10*3/MM3 (ref 0.72–4.86)
LYMPHOCYTES NFR BLD AUTO: 37.7 % (ref 15–45)
MCH RBC QN AUTO: 32.4 PG (ref 28–32)
MCHC RBC AUTO-ENTMCNC: 31.5 G/DL (ref 33–36)
MCV RBC AUTO: 102.8 FL (ref 82–95)
MONOCYTES # BLD AUTO: 0.55 10*3/MM3 (ref 0.19–1.3)
MONOCYTES NFR BLD AUTO: 10.4 % (ref 4–12)
NEUTROPHILS # BLD AUTO: 2.56 10*3/MM3 (ref 1.87–8.4)
NEUTROPHILS NFR BLD AUTO: 48.1 % (ref 39–78)
NRBC BLD MANUAL-RTO: 1.3 /100 WBC (ref 0–0)
PLATELET # BLD AUTO: 126 10*3/MM3 (ref 130–400)
PMV BLD AUTO: 12.9 FL (ref 6–12)
RBC # BLD AUTO: 2.87 10*6/MM3 (ref 4.8–5.9)
WBC NRBC COR # BLD: 5.31 10*3/MM3 (ref 4.8–10.8)

## 2018-02-13 PROCEDURE — 63510000001 EPOETIN ALFA PER 1000 UNITS: Performed by: INTERNAL MEDICINE

## 2018-02-13 PROCEDURE — 85025 COMPLETE CBC W/AUTO DIFF WBC: CPT

## 2018-02-13 PROCEDURE — 36415 COLL VENOUS BLD VENIPUNCTURE: CPT

## 2018-02-13 PROCEDURE — 96372 THER/PROPH/DIAG INJ SC/IM: CPT

## 2018-02-13 RX ADMIN — ERYTHROPOIETIN 40000 UNITS: 40000 INJECTION, SOLUTION INTRAVENOUS; SUBCUTANEOUS at 14:09

## 2018-02-20 ENCOUNTER — LAB (OUTPATIENT)
Dept: LAB | Facility: HOSPITAL | Age: 78
End: 2018-02-20
Attending: INTERNAL MEDICINE

## 2018-02-20 ENCOUNTER — INFUSION (OUTPATIENT)
Dept: ONCOLOGY | Facility: HOSPITAL | Age: 78
End: 2018-02-20
Attending: INTERNAL MEDICINE

## 2018-02-20 VITALS
SYSTOLIC BLOOD PRESSURE: 120 MMHG | OXYGEN SATURATION: 96 % | RESPIRATION RATE: 20 BRPM | BODY MASS INDEX: 23.8 KG/M2 | HEIGHT: 71 IN | TEMPERATURE: 97.8 F | DIASTOLIC BLOOD PRESSURE: 59 MMHG | HEART RATE: 90 BPM | WEIGHT: 170 LBS

## 2018-02-20 DIAGNOSIS — D46.9 MDS (MYELODYSPLASTIC SYNDROME) (HCC): Primary | ICD-10-CM

## 2018-02-20 DIAGNOSIS — D53.9 MACROCYTIC ANEMIA: ICD-10-CM

## 2018-02-20 LAB
ANISOCYTOSIS BLD QL: NORMAL
BASOPHILS # BLD AUTO: 0.02 10*3/MM3 (ref 0–0.2)
BASOPHILS NFR BLD AUTO: 0.3 % (ref 0–2)
DEPRECATED RDW RBC AUTO: 88 FL (ref 40–54)
EOSINOPHIL # BLD AUTO: 0.13 10*3/MM3 (ref 0–0.7)
EOSINOPHIL NFR BLD AUTO: 2.2 % (ref 0–4)
ERYTHROCYTE [DISTWIDTH] IN BLOOD BY AUTOMATED COUNT: 23.5 % (ref 12–15)
HCT VFR BLD AUTO: 28.5 % (ref 40–52)
HGB BLD-MCNC: 9.3 G/DL (ref 14–18)
IMM GRANULOCYTES # BLD: 0.03 10*3/MM3 (ref 0–0.03)
IMM GRANULOCYTES NFR BLD: 0.5 % (ref 0–5)
LARGE PLATELETS: NORMAL
LYMPHOCYTES # BLD AUTO: 2.23 10*3/MM3 (ref 0.72–4.86)
LYMPHOCYTES NFR BLD AUTO: 37.6 % (ref 15–45)
MACROCYTES BLD QL SMEAR: NORMAL
MCH RBC QN AUTO: 33.5 PG (ref 28–32)
MCHC RBC AUTO-ENTMCNC: 32.6 G/DL (ref 33–36)
MCV RBC AUTO: 102.5 FL (ref 82–95)
MONOCYTES # BLD AUTO: 0.56 10*3/MM3 (ref 0.19–1.3)
MONOCYTES NFR BLD AUTO: 9.4 % (ref 4–12)
NEUTROPHILS # BLD AUTO: 2.96 10*3/MM3 (ref 1.87–8.4)
NEUTROPHILS NFR BLD AUTO: 50 % (ref 39–78)
NRBC BLD MANUAL-RTO: 1 /100 WBC (ref 0–0)
PLATELET # BLD AUTO: 106 10*3/MM3 (ref 130–400)
PMV BLD AUTO: 12.9 FL (ref 6–12)
RBC # BLD AUTO: 2.78 10*6/MM3 (ref 4.8–5.9)
SMALL PLATELETS BLD QL SMEAR: ADEQUATE
WBC MORPH BLD: NORMAL
WBC NRBC COR # BLD: 5.93 10*3/MM3 (ref 4.8–10.8)

## 2018-02-20 PROCEDURE — 85025 COMPLETE CBC W/AUTO DIFF WBC: CPT

## 2018-02-20 PROCEDURE — 96372 THER/PROPH/DIAG INJ SC/IM: CPT

## 2018-02-20 PROCEDURE — 85007 BL SMEAR W/DIFF WBC COUNT: CPT

## 2018-02-20 PROCEDURE — 63510000001 EPOETIN ALFA PER 1000 UNITS: Performed by: INTERNAL MEDICINE

## 2018-02-20 PROCEDURE — 36415 COLL VENOUS BLD VENIPUNCTURE: CPT

## 2018-02-20 PROCEDURE — 96401 CHEMO ANTI-NEOPL SQ/IM: CPT

## 2018-02-20 RX ADMIN — ERYTHROPOIETIN 40000 UNITS: 40000 INJECTION, SOLUTION INTRAVENOUS; SUBCUTANEOUS at 13:58

## 2018-02-21 RX ORDER — LEVOTHYROXINE SODIUM 0.12 MG/1
TABLET ORAL
Qty: 90 TABLET | Refills: 3 | Status: SHIPPED | OUTPATIENT
Start: 2018-02-21 | End: 2018-04-25 | Stop reason: SDUPTHER

## 2018-02-27 ENCOUNTER — LAB (OUTPATIENT)
Dept: LAB | Facility: HOSPITAL | Age: 78
End: 2018-02-27
Attending: INTERNAL MEDICINE

## 2018-02-27 ENCOUNTER — INFUSION (OUTPATIENT)
Dept: ONCOLOGY | Facility: HOSPITAL | Age: 78
End: 2018-02-27
Attending: INTERNAL MEDICINE

## 2018-02-27 VITALS
TEMPERATURE: 97.8 F | OXYGEN SATURATION: 97 % | DIASTOLIC BLOOD PRESSURE: 62 MMHG | HEIGHT: 71 IN | SYSTOLIC BLOOD PRESSURE: 124 MMHG | BODY MASS INDEX: 23.8 KG/M2 | HEART RATE: 80 BPM | WEIGHT: 170 LBS | RESPIRATION RATE: 20 BRPM

## 2018-02-27 DIAGNOSIS — D46.9 MDS (MYELODYSPLASTIC SYNDROME) (HCC): Primary | ICD-10-CM

## 2018-02-27 DIAGNOSIS — D53.9 MACROCYTIC ANEMIA: ICD-10-CM

## 2018-02-27 LAB
BASOPHILS # BLD AUTO: 0.02 10*3/MM3 (ref 0–0.2)
BASOPHILS NFR BLD AUTO: 0.4 % (ref 0–2)
DEPRECATED RDW RBC AUTO: 96.9 FL (ref 40–54)
EOSINOPHIL # BLD AUTO: 0.12 10*3/MM3 (ref 0–0.7)
EOSINOPHIL NFR BLD AUTO: 2.4 % (ref 0–4)
ERYTHROCYTE [DISTWIDTH] IN BLOOD BY AUTOMATED COUNT: 24.3 % (ref 12–15)
HCT VFR BLD AUTO: 30.5 % (ref 40–52)
HGB BLD-MCNC: 9.5 G/DL (ref 14–18)
IMM GRANULOCYTES # BLD: 0.01 10*3/MM3 (ref 0–0.03)
IMM GRANULOCYTES NFR BLD: 0.2 % (ref 0–5)
LYMPHOCYTES # BLD AUTO: 2.04 10*3/MM3 (ref 0.72–4.86)
LYMPHOCYTES NFR BLD AUTO: 41.2 % (ref 15–45)
MCH RBC QN AUTO: 33.2 PG (ref 28–32)
MCHC RBC AUTO-ENTMCNC: 31.1 G/DL (ref 33–36)
MCV RBC AUTO: 106.6 FL (ref 82–95)
MONOCYTES # BLD AUTO: 0.51 10*3/MM3 (ref 0.19–1.3)
MONOCYTES NFR BLD AUTO: 10.3 % (ref 4–12)
NEUTROPHILS # BLD AUTO: 2.25 10*3/MM3 (ref 1.87–8.4)
NEUTROPHILS NFR BLD AUTO: 45.5 % (ref 39–78)
NRBC BLD MANUAL-RTO: 1.4 /100 WBC (ref 0–0)
PLATELET # BLD AUTO: 138 10*3/MM3 (ref 130–400)
PMV BLD AUTO: 12.7 FL (ref 6–12)
RBC # BLD AUTO: 2.86 10*6/MM3 (ref 4.8–5.9)
WBC NRBC COR # BLD: 4.95 10*3/MM3 (ref 4.8–10.8)

## 2018-02-27 PROCEDURE — 96372 THER/PROPH/DIAG INJ SC/IM: CPT

## 2018-02-27 PROCEDURE — 63510000001 EPOETIN ALFA PER 1000 UNITS: Performed by: INTERNAL MEDICINE

## 2018-02-27 PROCEDURE — 85025 COMPLETE CBC W/AUTO DIFF WBC: CPT | Performed by: INTERNAL MEDICINE

## 2018-02-27 PROCEDURE — 36415 COLL VENOUS BLD VENIPUNCTURE: CPT

## 2018-02-27 RX ADMIN — ERYTHROPOIETIN 40000 UNITS: 40000 INJECTION, SOLUTION INTRAVENOUS; SUBCUTANEOUS at 13:29

## 2018-03-06 ENCOUNTER — INFUSION (OUTPATIENT)
Dept: ONCOLOGY | Facility: HOSPITAL | Age: 78
End: 2018-03-06
Attending: INTERNAL MEDICINE

## 2018-03-06 ENCOUNTER — LAB (OUTPATIENT)
Dept: LAB | Facility: HOSPITAL | Age: 78
End: 2018-03-06
Attending: INTERNAL MEDICINE

## 2018-03-06 VITALS
RESPIRATION RATE: 16 BRPM | HEART RATE: 77 BPM | HEIGHT: 71 IN | DIASTOLIC BLOOD PRESSURE: 66 MMHG | OXYGEN SATURATION: 97 % | BODY MASS INDEX: 23.94 KG/M2 | SYSTOLIC BLOOD PRESSURE: 128 MMHG | WEIGHT: 171 LBS | TEMPERATURE: 97.7 F

## 2018-03-06 DIAGNOSIS — D53.9 MACROCYTIC ANEMIA: ICD-10-CM

## 2018-03-06 DIAGNOSIS — D46.9 MDS (MYELODYSPLASTIC SYNDROME) (HCC): Primary | ICD-10-CM

## 2018-03-06 LAB
ANISOCYTOSIS BLD QL: ABNORMAL
DACRYOCYTES BLD QL SMEAR: ABNORMAL
DEPRECATED RDW RBC AUTO: 98.5 FL (ref 40–54)
EOSINOPHIL # BLD MANUAL: 0.28 10*3/MM3 (ref 0–0.7)
EOSINOPHIL NFR BLD MANUAL: 6 % (ref 0–4)
ERYTHROCYTE [DISTWIDTH] IN BLOOD BY AUTOMATED COUNT: 25.1 % (ref 12–15)
HCT VFR BLD AUTO: 27.3 % (ref 40–52)
HGB BLD-MCNC: 8.6 G/DL (ref 14–18)
LYMPHOCYTES # BLD MANUAL: 1.52 10*3/MM3 (ref 0.72–4.86)
LYMPHOCYTES NFR BLD MANUAL: 32 % (ref 15–45)
LYMPHOCYTES NFR BLD MANUAL: 6 % (ref 4–12)
MACROCYTES BLD QL SMEAR: ABNORMAL
MCH RBC QN AUTO: 33.7 PG (ref 28–32)
MCHC RBC AUTO-ENTMCNC: 31.5 G/DL (ref 33–36)
MCV RBC AUTO: 107.1 FL (ref 82–95)
MONOCYTES # BLD AUTO: 0.28 10*3/MM3 (ref 0.19–1.3)
NEUTROPHILS # BLD AUTO: 2.42 10*3/MM3 (ref 1.87–8.4)
NEUTROPHILS NFR BLD MANUAL: 35 % (ref 39–78)
NEUTS BAND NFR BLD MANUAL: 16 % (ref 0–10)
PLATELET # BLD AUTO: 124 10*3/MM3 (ref 130–400)
PMV BLD AUTO: 13.6 FL (ref 6–12)
POIKILOCYTOSIS BLD QL SMEAR: ABNORMAL
POLYCHROMASIA BLD QL SMEAR: ABNORMAL
RBC # BLD AUTO: 2.55 10*6/MM3 (ref 4.8–5.9)
SMALL PLATELETS BLD QL SMEAR: ADEQUATE
VARIANT LYMPHS NFR BLD MANUAL: 5 % (ref 0–5)
WBC MORPH BLD: NORMAL
WBC NRBC COR # BLD: 4.74 10*3/MM3 (ref 4.8–10.8)

## 2018-03-06 PROCEDURE — 85007 BL SMEAR W/DIFF WBC COUNT: CPT

## 2018-03-06 PROCEDURE — 36415 COLL VENOUS BLD VENIPUNCTURE: CPT

## 2018-03-06 PROCEDURE — 85025 COMPLETE CBC W/AUTO DIFF WBC: CPT

## 2018-03-06 PROCEDURE — 63510000001 EPOETIN ALFA PER 1000 UNITS: Performed by: INTERNAL MEDICINE

## 2018-03-06 PROCEDURE — 96372 THER/PROPH/DIAG INJ SC/IM: CPT

## 2018-03-06 RX ADMIN — ERYTHROPOIETIN 40000 UNITS: 40000 INJECTION, SOLUTION INTRAVENOUS; SUBCUTANEOUS at 13:57

## 2018-03-13 ENCOUNTER — INFUSION (OUTPATIENT)
Dept: ONCOLOGY | Facility: HOSPITAL | Age: 78
End: 2018-03-13
Attending: INTERNAL MEDICINE

## 2018-03-13 ENCOUNTER — LAB (OUTPATIENT)
Dept: LAB | Facility: HOSPITAL | Age: 78
End: 2018-03-13

## 2018-03-13 VITALS
HEIGHT: 71 IN | SYSTOLIC BLOOD PRESSURE: 139 MMHG | WEIGHT: 172 LBS | RESPIRATION RATE: 20 BRPM | BODY MASS INDEX: 24.08 KG/M2 | DIASTOLIC BLOOD PRESSURE: 59 MMHG | TEMPERATURE: 97.3 F | OXYGEN SATURATION: 100 % | HEART RATE: 76 BPM

## 2018-03-13 DIAGNOSIS — D53.9 MACROCYTIC ANEMIA: ICD-10-CM

## 2018-03-13 DIAGNOSIS — D46.9 MDS (MYELODYSPLASTIC SYNDROME) (HCC): Primary | ICD-10-CM

## 2018-03-13 LAB
BASOPHILS # BLD AUTO: 0.02 10*3/MM3 (ref 0–0.2)
BASOPHILS NFR BLD AUTO: 0.4 % (ref 0–2)
DEPRECATED RDW RBC AUTO: 98.1 FL (ref 40–54)
EOSINOPHIL # BLD AUTO: 0.1 10*3/MM3 (ref 0–0.7)
EOSINOPHIL NFR BLD AUTO: 2.2 % (ref 0–4)
ERYTHROCYTE [DISTWIDTH] IN BLOOD BY AUTOMATED COUNT: 25.1 % (ref 12–15)
HCT VFR BLD AUTO: 28.8 % (ref 40–52)
HGB BLD-MCNC: 9 G/DL (ref 14–18)
LYMPHOCYTES # BLD AUTO: 1.65 10*3/MM3 (ref 0.72–4.86)
LYMPHOCYTES NFR BLD AUTO: 36.5 % (ref 15–45)
MCH RBC QN AUTO: 33.7 PG (ref 28–32)
MCHC RBC AUTO-ENTMCNC: 31.3 G/DL (ref 33–36)
MCV RBC AUTO: 107.9 FL (ref 82–95)
MONOCYTES # BLD AUTO: 0.24 10*3/MM3 (ref 0.19–1.3)
MONOCYTES NFR BLD AUTO: 5.3 % (ref 4–12)
NEUTROPHILS # BLD AUTO: 2.5 10*3/MM3 (ref 1.87–8.4)
NEUTROPHILS NFR BLD AUTO: 55.4 % (ref 39–78)
PLATELET # BLD AUTO: 140 10*3/MM3 (ref 130–400)
PMV BLD AUTO: 13 FL (ref 6–12)
RBC # BLD AUTO: 2.67 10*6/MM3 (ref 4.8–5.9)
WBC NRBC COR # BLD: 4.52 10*3/MM3 (ref 4.8–10.8)

## 2018-03-13 PROCEDURE — 85025 COMPLETE CBC W/AUTO DIFF WBC: CPT | Performed by: INTERNAL MEDICINE

## 2018-03-13 PROCEDURE — 63510000001 EPOETIN ALFA PER 1000 UNITS: Performed by: INTERNAL MEDICINE

## 2018-03-13 PROCEDURE — 96372 THER/PROPH/DIAG INJ SC/IM: CPT

## 2018-03-13 PROCEDURE — 36415 COLL VENOUS BLD VENIPUNCTURE: CPT

## 2018-03-13 RX ADMIN — ERYTHROPOIETIN 40000 UNITS: 40000 INJECTION, SOLUTION INTRAVENOUS; SUBCUTANEOUS at 13:54

## 2018-03-20 ENCOUNTER — LAB (OUTPATIENT)
Dept: LAB | Facility: HOSPITAL | Age: 78
End: 2018-03-20

## 2018-03-20 ENCOUNTER — INFUSION (OUTPATIENT)
Dept: ONCOLOGY | Facility: HOSPITAL | Age: 78
End: 2018-03-20
Attending: INTERNAL MEDICINE

## 2018-03-20 ENCOUNTER — TRANSCRIBE ORDERS (OUTPATIENT)
Dept: ADMINISTRATIVE | Facility: HOSPITAL | Age: 78
End: 2018-03-20

## 2018-03-20 VITALS
WEIGHT: 169.4 LBS | HEIGHT: 71 IN | OXYGEN SATURATION: 100 % | DIASTOLIC BLOOD PRESSURE: 65 MMHG | RESPIRATION RATE: 20 BRPM | TEMPERATURE: 97.7 F | BODY MASS INDEX: 23.72 KG/M2 | SYSTOLIC BLOOD PRESSURE: 151 MMHG | HEART RATE: 67 BPM

## 2018-03-20 DIAGNOSIS — D46.9 MDS (MYELODYSPLASTIC SYNDROME) (HCC): Primary | ICD-10-CM

## 2018-03-20 DIAGNOSIS — D53.9 MACROCYTIC ANEMIA: Primary | ICD-10-CM

## 2018-03-20 DIAGNOSIS — D53.9 MACROCYTIC ANEMIA: ICD-10-CM

## 2018-03-20 LAB
DEPRECATED RDW RBC AUTO: 98.3 FL (ref 40–54)
ERYTHROCYTE [DISTWIDTH] IN BLOOD BY AUTOMATED COUNT: 24.6 % (ref 12–15)
FERRITIN SERPL-MCNC: 841 NG/ML (ref 17.9–464)
HCT VFR BLD AUTO: 28.7 % (ref 40–52)
HGB BLD-MCNC: 9.1 G/DL (ref 14–18)
IRON 24H UR-MRATE: 112 MCG/DL (ref 42–180)
IRON SATN MFR SERPL: 47 % (ref 20–45)
MCH RBC QN AUTO: 34 PG (ref 28–32)
MCHC RBC AUTO-ENTMCNC: 31.7 G/DL (ref 33–36)
MCV RBC AUTO: 107.1 FL (ref 82–95)
NRBC BLD MANUAL-RTO: 1.3 /100 WBC (ref 0–0)
PLATELET # BLD AUTO: 122 10*3/MM3 (ref 130–400)
PMV BLD AUTO: 12 FL (ref 6–12)
RBC # BLD AUTO: 2.68 10*6/MM3 (ref 4.8–5.9)
TIBC SERPL-MCNC: 236 MCG/DL (ref 225–420)
WBC NRBC COR # BLD: 4.79 10*3/MM3 (ref 4.8–10.8)

## 2018-03-20 PROCEDURE — 36415 COLL VENOUS BLD VENIPUNCTURE: CPT

## 2018-03-20 PROCEDURE — 63510000001 EPOETIN ALFA PER 1000 UNITS: Performed by: INTERNAL MEDICINE

## 2018-03-20 PROCEDURE — 85025 COMPLETE CBC W/AUTO DIFF WBC: CPT | Performed by: INTERNAL MEDICINE

## 2018-03-20 PROCEDURE — 83550 IRON BINDING TEST: CPT | Performed by: INTERNAL MEDICINE

## 2018-03-20 PROCEDURE — 82728 ASSAY OF FERRITIN: CPT | Performed by: INTERNAL MEDICINE

## 2018-03-20 PROCEDURE — 83540 ASSAY OF IRON: CPT | Performed by: INTERNAL MEDICINE

## 2018-03-20 PROCEDURE — 96372 THER/PROPH/DIAG INJ SC/IM: CPT

## 2018-03-20 RX ADMIN — ERYTHROPOIETIN 40000 UNITS: 40000 INJECTION, SOLUTION INTRAVENOUS; SUBCUTANEOUS at 14:29

## 2018-03-27 ENCOUNTER — LAB (OUTPATIENT)
Dept: LAB | Facility: HOSPITAL | Age: 78
End: 2018-03-27

## 2018-03-27 ENCOUNTER — INFUSION (OUTPATIENT)
Dept: ONCOLOGY | Facility: HOSPITAL | Age: 78
End: 2018-03-27
Attending: INTERNAL MEDICINE

## 2018-03-27 VITALS
TEMPERATURE: 97.4 F | SYSTOLIC BLOOD PRESSURE: 128 MMHG | BODY MASS INDEX: 23.66 KG/M2 | OXYGEN SATURATION: 99 % | HEIGHT: 71 IN | RESPIRATION RATE: 20 BRPM | DIASTOLIC BLOOD PRESSURE: 75 MMHG | HEART RATE: 76 BPM | WEIGHT: 169 LBS

## 2018-03-27 DIAGNOSIS — D46.9 MDS (MYELODYSPLASTIC SYNDROME) (HCC): Primary | ICD-10-CM

## 2018-03-27 DIAGNOSIS — D53.9 MACROCYTIC ANEMIA: ICD-10-CM

## 2018-03-27 LAB
BASOPHILS # BLD MANUAL: 0.05 10*3/MM3 (ref 0–0.2)
BASOPHILS NFR BLD AUTO: 1 % (ref 0–2)
DEPRECATED RDW RBC AUTO: 97.1 FL (ref 40–54)
EOSINOPHIL # BLD MANUAL: 0.18 10*3/MM3 (ref 0–0.7)
EOSINOPHIL NFR BLD MANUAL: 4 % (ref 0–4)
ERYTHROCYTE [DISTWIDTH] IN BLOOD BY AUTOMATED COUNT: 24.8 % (ref 12–15)
HCT VFR BLD AUTO: 28.9 % (ref 40–52)
HGB BLD-MCNC: 9.1 G/DL (ref 14–18)
HYPOCHROMIA BLD QL: ABNORMAL
LYMPHOCYTES # BLD MANUAL: 1.41 10*3/MM3 (ref 0.72–4.86)
LYMPHOCYTES NFR BLD MANUAL: 31 % (ref 15–45)
LYMPHOCYTES NFR BLD MANUAL: 6 % (ref 4–12)
MCH RBC QN AUTO: 34.2 PG (ref 28–32)
MCHC RBC AUTO-ENTMCNC: 31.5 G/DL (ref 33–36)
MCV RBC AUTO: 108.6 FL (ref 82–95)
MONOCYTES # BLD AUTO: 0.27 10*3/MM3 (ref 0.19–1.3)
NEUTROPHILS # BLD AUTO: 2.51 10*3/MM3 (ref 1.87–8.4)
NEUTROPHILS NFR BLD MANUAL: 55 % (ref 39–78)
NRBC BLD MANUAL-RTO: 2 /100 WBC (ref 0–0)
NRBC SPEC MANUAL: 2 /100 WBC (ref 0–0)
PLATELET # BLD AUTO: 122 10*3/MM3 (ref 130–400)
PMV BLD AUTO: 13.4 FL (ref 6–12)
POIKILOCYTOSIS BLD QL SMEAR: ABNORMAL
RBC # BLD AUTO: 2.66 10*6/MM3 (ref 4.8–5.9)
SMALL PLATELETS BLD QL SMEAR: ABNORMAL
VARIANT LYMPHS NFR BLD MANUAL: 3 % (ref 0–5)
WBC MORPH BLD: NORMAL
WBC NRBC COR # BLD: 4.56 10*3/MM3 (ref 4.8–10.8)

## 2018-03-27 PROCEDURE — 63510000001 EPOETIN ALFA PER 1000 UNITS: Performed by: INTERNAL MEDICINE

## 2018-03-27 PROCEDURE — 96372 THER/PROPH/DIAG INJ SC/IM: CPT

## 2018-03-27 PROCEDURE — 85025 COMPLETE CBC W/AUTO DIFF WBC: CPT | Performed by: INTERNAL MEDICINE

## 2018-03-27 PROCEDURE — 36415 COLL VENOUS BLD VENIPUNCTURE: CPT

## 2018-03-27 PROCEDURE — 85007 BL SMEAR W/DIFF WBC COUNT: CPT | Performed by: INTERNAL MEDICINE

## 2018-03-27 RX ADMIN — ERYTHROPOIETIN 40000 UNITS: 40000 INJECTION, SOLUTION INTRAVENOUS; SUBCUTANEOUS at 14:14

## 2018-04-03 ENCOUNTER — INFUSION (OUTPATIENT)
Dept: ONCOLOGY | Facility: HOSPITAL | Age: 78
End: 2018-04-03
Attending: INTERNAL MEDICINE

## 2018-04-03 ENCOUNTER — LAB (OUTPATIENT)
Dept: LAB | Facility: HOSPITAL | Age: 78
End: 2018-04-03
Attending: INTERNAL MEDICINE

## 2018-04-03 DIAGNOSIS — D46.9 MDS (MYELODYSPLASTIC SYNDROME) (HCC): Primary | ICD-10-CM

## 2018-04-03 DIAGNOSIS — D53.9 MACROCYTIC ANEMIA: ICD-10-CM

## 2018-04-03 LAB
ANISOCYTOSIS BLD QL: ABNORMAL
DEPRECATED RDW RBC AUTO: 97.5 FL (ref 40–54)
EOSINOPHIL # BLD MANUAL: 0.16 10*3/MM3 (ref 0–0.7)
EOSINOPHIL NFR BLD MANUAL: 3 % (ref 0–7)
ERYTHROCYTE [DISTWIDTH] IN BLOOD BY AUTOMATED COUNT: 25 % (ref 12–15)
GIANT PLATELETS: ABNORMAL
HCT VFR BLD AUTO: 26.7 % (ref 40–52)
HGB BLD-MCNC: 8.4 G/DL (ref 14–18)
HYPOCHROMIA BLD QL: ABNORMAL
LYMPHOCYTES # BLD MANUAL: 1.5 10*3/MM3 (ref 0.8–7)
LYMPHOCYTES NFR BLD MANUAL: 28 % (ref 24–44)
LYMPHOCYTES NFR BLD MANUAL: 7 % (ref 0–12)
MACROCYTES BLD QL SMEAR: ABNORMAL
MCH RBC QN AUTO: 33.9 PG (ref 28–32)
MCHC RBC AUTO-ENTMCNC: 31.5 G/DL (ref 33–36)
MCV RBC AUTO: 107.7 FL (ref 82–95)
MONOCYTES # BLD AUTO: 0.37 10*3/MM3 (ref 0–1)
NEUTROPHILS # BLD AUTO: 3.26 10*3/MM3 (ref 1–11)
NEUTROPHILS NFR BLD MANUAL: 59 % (ref 37–80)
NEUTS BAND NFR BLD MANUAL: 2 % (ref 0–5)
NRBC SPEC MANUAL: 5 /100 WBC (ref 0–0)
OVALOCYTES BLD QL SMEAR: ABNORMAL
PLASMA CELL PREC NFR BLD MANUAL: 1 % (ref 0–0)
PLATELET # BLD AUTO: 126 10*3/MM3 (ref 130–400)
PMV BLD AUTO: 12.1 FL (ref 6–12)
POIKILOCYTOSIS BLD QL SMEAR: ABNORMAL
POLYCHROMASIA BLD QL SMEAR: ABNORMAL
RBC # BLD AUTO: 2.48 10*6/MM3 (ref 4.8–5.9)
SMALL PLATELETS BLD QL SMEAR: ABNORMAL
WBC MORPH BLD: NORMAL
WBC NRBC COR # BLD: 5.34 10*3/MM3 (ref 4.8–10.8)

## 2018-04-03 PROCEDURE — 63510000001 EPOETIN ALFA PER 1000 UNITS: Performed by: INTERNAL MEDICINE

## 2018-04-03 PROCEDURE — 96372 THER/PROPH/DIAG INJ SC/IM: CPT

## 2018-04-03 PROCEDURE — 36415 COLL VENOUS BLD VENIPUNCTURE: CPT

## 2018-04-03 PROCEDURE — 85025 COMPLETE CBC W/AUTO DIFF WBC: CPT | Performed by: INTERNAL MEDICINE

## 2018-04-03 PROCEDURE — 85007 BL SMEAR W/DIFF WBC COUNT: CPT | Performed by: INTERNAL MEDICINE

## 2018-04-03 RX ADMIN — ERYTHROPOIETIN 40000 UNITS: 40000 INJECTION, SOLUTION INTRAVENOUS; SUBCUTANEOUS at 13:36

## 2018-04-10 ENCOUNTER — LAB (OUTPATIENT)
Dept: LAB | Facility: HOSPITAL | Age: 78
End: 2018-04-10

## 2018-04-10 ENCOUNTER — INFUSION (OUTPATIENT)
Dept: ONCOLOGY | Facility: HOSPITAL | Age: 78
End: 2018-04-10

## 2018-04-10 VITALS
TEMPERATURE: 98.2 F | DIASTOLIC BLOOD PRESSURE: 55 MMHG | BODY MASS INDEX: 23.69 KG/M2 | HEIGHT: 71 IN | OXYGEN SATURATION: 99 % | SYSTOLIC BLOOD PRESSURE: 136 MMHG | HEART RATE: 83 BPM | RESPIRATION RATE: 18 BRPM | WEIGHT: 169.2 LBS

## 2018-04-10 DIAGNOSIS — R73.01 IFG (IMPAIRED FASTING GLUCOSE): ICD-10-CM

## 2018-04-10 DIAGNOSIS — D46.9 MDS (MYELODYSPLASTIC SYNDROME) (HCC): Primary | ICD-10-CM

## 2018-04-10 DIAGNOSIS — Z91.81 RISK FOR FALLS: ICD-10-CM

## 2018-04-10 DIAGNOSIS — D53.9 MACROCYTIC ANEMIA: ICD-10-CM

## 2018-04-10 DIAGNOSIS — E89.0 HYPOTHYROIDISM, POSTOP: ICD-10-CM

## 2018-04-10 DIAGNOSIS — J43.9 PULMONARY EMPHYSEMA, UNSPECIFIED EMPHYSEMA TYPE (HCC): ICD-10-CM

## 2018-04-10 LAB
ALBUMIN SERPL-MCNC: 4.2 G/DL (ref 3.5–5.2)
ALP BLD-CCNC: 107 U/L (ref 40–130)
ALT SERPL-CCNC: 11 U/L (ref 5–41)
ANION GAP SERPL CALCULATED.3IONS-SCNC: 14 MMOL/L (ref 7–19)
AST SERPL-CCNC: 16 U/L (ref 5–40)
BASOPHILS # BLD AUTO: 0.03 10*3/MM3 (ref 0–0.2)
BASOPHILS NFR BLD AUTO: 0.6 % (ref 0–2)
BILIRUB SERPL-MCNC: 1 MG/DL (ref 0.2–1.2)
BUN BLDV-MCNC: 16 MG/DL (ref 8–23)
CALCIUM SERPL-MCNC: 8.9 MG/DL (ref 8.8–10.2)
CHLORIDE BLD-SCNC: 101 MMOL/L (ref 98–111)
CHOLESTEROL, TOTAL: 133 MG/DL (ref 160–199)
CO2: 25 MMOL/L (ref 22–29)
CREAT SERPL-MCNC: 0.9 MG/DL (ref 0.5–1.2)
CREATININE URINE: 71.9 MG/DL (ref 4.2–622)
DEPRECATED RDW RBC AUTO: 95.2 FL (ref 40–54)
EOSINOPHIL # BLD AUTO: 0.21 10*3/MM3 (ref 0–0.7)
EOSINOPHIL NFR BLD AUTO: 4.1 % (ref 0–4)
ERYTHROCYTE [DISTWIDTH] IN BLOOD BY AUTOMATED COUNT: 23.9 % (ref 12–15)
GFR NON-AFRICAN AMERICAN: >60
GLUCOSE BLD-MCNC: 99 MG/DL (ref 74–109)
HBA1C MFR BLD: 4.7 %
HCT VFR BLD AUTO: 26.1 % (ref 40–52)
HDLC SERPL-MCNC: 69 MG/DL (ref 55–121)
HGB BLD-MCNC: 8.4 G/DL (ref 14–18)
LDL CHOLESTEROL CALCULATED: 48 MG/DL
LYMPHOCYTES # BLD AUTO: 1.66 10*3/MM3 (ref 0.72–4.86)
LYMPHOCYTES NFR BLD AUTO: 32.4 % (ref 15–45)
MCH RBC QN AUTO: 35 PG (ref 28–32)
MCHC RBC AUTO-ENTMCNC: 32.2 G/DL (ref 33–36)
MCV RBC AUTO: 108.8 FL (ref 82–95)
MICROALBUMIN UR-MCNC: 8.3 MG/DL (ref 0–19)
MICROALBUMIN/CREAT UR-RTO: 115.4 MG/G
MONOCYTES # BLD AUTO: 0.63 10*3/MM3 (ref 0.19–1.3)
MONOCYTES NFR BLD AUTO: 12.3 % (ref 4–12)
NEUTROPHILS # BLD AUTO: 2.58 10*3/MM3 (ref 1.87–8.4)
NEUTROPHILS NFR BLD AUTO: 50.4 % (ref 39–78)
PLATELET # BLD AUTO: 113 10*3/MM3 (ref 130–400)
PMV BLD AUTO: 13.5 FL (ref 6–12)
POTASSIUM SERPL-SCNC: 4 MMOL/L (ref 3.5–5)
RBC # BLD AUTO: 2.4 10*6/MM3 (ref 4.8–5.9)
SODIUM BLD-SCNC: 140 MMOL/L (ref 136–145)
TOTAL PROTEIN: 6.9 G/DL (ref 6.6–8.7)
TRIGL SERPL-MCNC: 82 MG/DL (ref 0–149)
TSH SERPL DL<=0.05 MIU/L-ACNC: 0.05 UIU/ML (ref 0.27–4.2)
WBC NRBC COR # BLD: 5.12 10*3/MM3 (ref 4.8–10.8)

## 2018-04-10 PROCEDURE — 63510000001 EPOETIN ALFA PER 1000 UNITS: Performed by: INTERNAL MEDICINE

## 2018-04-10 PROCEDURE — 85025 COMPLETE CBC W/AUTO DIFF WBC: CPT | Performed by: INTERNAL MEDICINE

## 2018-04-10 PROCEDURE — 96372 THER/PROPH/DIAG INJ SC/IM: CPT

## 2018-04-10 PROCEDURE — 36415 COLL VENOUS BLD VENIPUNCTURE: CPT

## 2018-04-10 RX ADMIN — ERYTHROPOIETIN 40000 UNITS: 40000 INJECTION, SOLUTION INTRAVENOUS; SUBCUTANEOUS at 13:36

## 2018-04-25 ENCOUNTER — HOSPITAL ENCOUNTER (OUTPATIENT)
Dept: GENERAL RADIOLOGY | Age: 78
Discharge: HOME | End: 2018-04-25
Payer: MEDICARE

## 2018-04-25 ENCOUNTER — OFFICE VISIT (OUTPATIENT)
Dept: INTERNAL MEDICINE | Age: 78
End: 2018-04-25
Payer: MEDICARE

## 2018-04-25 VITALS
BODY MASS INDEX: 23.94 KG/M2 | HEART RATE: 77 BPM | WEIGHT: 171 LBS | DIASTOLIC BLOOD PRESSURE: 82 MMHG | OXYGEN SATURATION: 98 % | SYSTOLIC BLOOD PRESSURE: 152 MMHG | HEIGHT: 71 IN

## 2018-04-25 DIAGNOSIS — J43.9 PULMONARY EMPHYSEMA, UNSPECIFIED EMPHYSEMA TYPE (HCC): ICD-10-CM

## 2018-04-25 DIAGNOSIS — C73 THYROID CANCER (HCC): ICD-10-CM

## 2018-04-25 DIAGNOSIS — R73.01 IFG (IMPAIRED FASTING GLUCOSE): ICD-10-CM

## 2018-04-25 DIAGNOSIS — Z91.81 RISK FOR FALLS: ICD-10-CM

## 2018-04-25 DIAGNOSIS — I25.10 CORONARY ARTERY DISEASE INVOLVING NATIVE CORONARY ARTERY OF NATIVE HEART WITHOUT ANGINA PECTORIS: ICD-10-CM

## 2018-04-25 DIAGNOSIS — R73.01 IFG (IMPAIRED FASTING GLUCOSE): Primary | ICD-10-CM

## 2018-04-25 DIAGNOSIS — Z00.00 ROUTINE GENERAL MEDICAL EXAMINATION AT A HEALTH CARE FACILITY: ICD-10-CM

## 2018-04-25 DIAGNOSIS — E89.0 HYPOTHYROIDISM, POSTOP: ICD-10-CM

## 2018-04-25 DIAGNOSIS — D46.9 MYELODYSPLASIA (MYELODYSPLASTIC SYNDROME) (HCC): ICD-10-CM

## 2018-04-25 PROCEDURE — 99214 OFFICE O/P EST MOD 30 MIN: CPT | Performed by: INTERNAL MEDICINE

## 2018-04-25 PROCEDURE — G8420 CALC BMI NORM PARAMETERS: HCPCS | Performed by: INTERNAL MEDICINE

## 2018-04-25 PROCEDURE — G8427 DOCREV CUR MEDS BY ELIG CLIN: HCPCS | Performed by: INTERNAL MEDICINE

## 2018-04-25 PROCEDURE — G8926 SPIRO NO PERF OR DOC: HCPCS | Performed by: INTERNAL MEDICINE

## 2018-04-25 PROCEDURE — 3023F SPIROM DOC REV: CPT | Performed by: INTERNAL MEDICINE

## 2018-04-25 PROCEDURE — 1036F TOBACCO NON-USER: CPT | Performed by: INTERNAL MEDICINE

## 2018-04-25 PROCEDURE — G8598 ASA/ANTIPLAT THER USED: HCPCS | Performed by: INTERNAL MEDICINE

## 2018-04-25 PROCEDURE — 93000 ELECTROCARDIOGRAM COMPLETE: CPT | Performed by: INTERNAL MEDICINE

## 2018-04-25 PROCEDURE — 1123F ACP DISCUSS/DSCN MKR DOCD: CPT | Performed by: INTERNAL MEDICINE

## 2018-04-25 PROCEDURE — 71046 X-RAY EXAM CHEST 2 VIEWS: CPT

## 2018-04-25 PROCEDURE — 4040F PNEUMOC VAC/ADMIN/RCVD: CPT | Performed by: INTERNAL MEDICINE

## 2018-04-25 RX ORDER — ACETAMINOPHEN AND CODEINE PHOSPHATE 300; 30 MG/1; MG/1
1 TABLET ORAL 3 TIMES DAILY PRN
Qty: 90 TABLET | Refills: 0 | Status: SHIPPED | OUTPATIENT
Start: 2018-04-25 | End: 2018-08-28 | Stop reason: SDUPTHER

## 2018-04-25 RX ORDER — LEVOTHYROXINE SODIUM 0.12 MG/1
TABLET ORAL
Qty: 90 TABLET | Refills: 3 | Status: SHIPPED | OUTPATIENT
Start: 2018-04-25 | End: 2018-04-25 | Stop reason: SDUPTHER

## 2018-04-25 RX ORDER — ACETAMINOPHEN AND CODEINE PHOSPHATE 300; 30 MG/1; MG/1
1 TABLET ORAL 3 TIMES DAILY PRN
Qty: 90 TABLET | Refills: 0 | Status: SHIPPED | OUTPATIENT
Start: 2018-04-25 | End: 2018-04-25 | Stop reason: SDUPTHER

## 2018-04-25 RX ORDER — ERYTHROPOIETIN 40000 [IU]/ML
INJECTION, SOLUTION INTRAVENOUS; SUBCUTANEOUS
COMMUNITY
Start: 2018-04-06

## 2018-04-25 RX ORDER — SIMVASTATIN 20 MG
TABLET ORAL
Qty: 90 TABLET | Refills: 2 | Status: SHIPPED | OUTPATIENT
Start: 2018-04-25 | End: 2019-01-01 | Stop reason: SDUPTHER

## 2018-04-25 RX ORDER — LEVOTHYROXINE SODIUM 112 UG/1
TABLET ORAL
Qty: 90 TABLET | Refills: 3 | Status: SHIPPED | OUTPATIENT
Start: 2018-04-25 | End: 2018-08-28

## 2018-04-25 RX ORDER — SIMVASTATIN 20 MG
TABLET ORAL
Qty: 90 TABLET | Refills: 2 | Status: SHIPPED | OUTPATIENT
Start: 2018-04-25 | End: 2018-04-25 | Stop reason: SDUPTHER

## 2018-04-25 ASSESSMENT — ENCOUNTER SYMPTOMS
SORE THROAT: 0
ABDOMINAL PAIN: 0
NAUSEA: 0
TROUBLE SWALLOWING: 0
SHORTNESS OF BREATH: 1
RHINORRHEA: 0
COUGH: 0

## 2018-06-29 ENCOUNTER — HOSPITAL ENCOUNTER (OUTPATIENT)
Dept: CT IMAGING | Age: 78
Discharge: HOME | End: 2018-06-29
Payer: MEDICARE

## 2018-06-29 DIAGNOSIS — R06.00 DYSPNEA, UNSPECIFIED TYPE: ICD-10-CM

## 2018-06-29 DIAGNOSIS — J98.4 SCARRING OF LUNG: ICD-10-CM

## 2018-06-29 PROCEDURE — 71250 CT THORAX DX C-: CPT

## 2018-07-16 DIAGNOSIS — Z91.81 RISK FOR FALLS: ICD-10-CM

## 2018-07-16 DIAGNOSIS — J43.9 PULMONARY EMPHYSEMA, UNSPECIFIED EMPHYSEMA TYPE (HCC): ICD-10-CM

## 2018-07-16 DIAGNOSIS — D46.9 MYELODYSPLASIA (MYELODYSPLASTIC SYNDROME) (HCC): ICD-10-CM

## 2018-07-16 DIAGNOSIS — I25.10 CORONARY ARTERY DISEASE INVOLVING NATIVE CORONARY ARTERY OF NATIVE HEART WITHOUT ANGINA PECTORIS: ICD-10-CM

## 2018-07-16 DIAGNOSIS — R73.01 IFG (IMPAIRED FASTING GLUCOSE): ICD-10-CM

## 2018-07-16 DIAGNOSIS — C73 THYROID CANCER (HCC): ICD-10-CM

## 2018-07-16 DIAGNOSIS — E89.0 HYPOTHYROIDISM, POSTOP: ICD-10-CM

## 2018-07-30 ENCOUNTER — TRANSCRIBE ORDERS (OUTPATIENT)
Dept: ADMINISTRATIVE | Facility: HOSPITAL | Age: 78
End: 2018-07-30

## 2018-07-30 DIAGNOSIS — I27.20 PHT (PULMONARY HYPERTENSION) (HCC): Primary | ICD-10-CM

## 2018-08-02 ENCOUNTER — HOSPITAL ENCOUNTER (OUTPATIENT)
Dept: CARDIOLOGY | Facility: HOSPITAL | Age: 78
Discharge: HOME | End: 2018-08-02
Attending: INTERNAL MEDICINE | Admitting: INTERNAL MEDICINE

## 2018-08-02 VITALS
HEIGHT: 71 IN | DIASTOLIC BLOOD PRESSURE: 71 MMHG | BODY MASS INDEX: 22.82 KG/M2 | WEIGHT: 163 LBS | SYSTOLIC BLOOD PRESSURE: 145 MMHG

## 2018-08-02 DIAGNOSIS — I27.20 PHT (PULMONARY HYPERTENSION) (HCC): ICD-10-CM

## 2018-08-02 PROCEDURE — 93306 TTE W/DOPPLER COMPLETE: CPT | Performed by: INTERNAL MEDICINE

## 2018-08-02 PROCEDURE — 93306 TTE W/DOPPLER COMPLETE: CPT

## 2018-08-03 LAB
BH CV ECHO MEAS - AO MAX PG (FULL): 1.6 MMHG
BH CV ECHO MEAS - AO MAX PG: 5.1 MMHG
BH CV ECHO MEAS - AO MEAN PG (FULL): 1 MMHG
BH CV ECHO MEAS - AO MEAN PG: 3 MMHG
BH CV ECHO MEAS - AO ROOT AREA (BSA CORRECTED): 1.7
BH CV ECHO MEAS - AO ROOT AREA: 8.6 CM^2
BH CV ECHO MEAS - AO ROOT DIAM: 3.3 CM
BH CV ECHO MEAS - AO V2 MAX: 113 CM/SEC
BH CV ECHO MEAS - AO V2 MEAN: 81.9 CM/SEC
BH CV ECHO MEAS - AO V2 VTI: 21.6 CM
BH CV ECHO MEAS - AVA(I,A): 2.8 CM^2
BH CV ECHO MEAS - AVA(I,D): 2.8 CM^2
BH CV ECHO MEAS - AVA(V,A): 2.9 CM^2
BH CV ECHO MEAS - AVA(V,D): 2.9 CM^2
BH CV ECHO MEAS - BSA(HAYCOCK): 1.9 M^2
BH CV ECHO MEAS - BSA: 1.9 M^2
BH CV ECHO MEAS - BZI_BMI: 22.7 KILOGRAMS/M^2
BH CV ECHO MEAS - BZI_METRIC_HEIGHT: 180.3 CM
BH CV ECHO MEAS - BZI_METRIC_WEIGHT: 73.9 KG
BH CV ECHO MEAS - CONTRAST EF 4CH: 53.1 ML/M^2
BH CV ECHO MEAS - EDV(CUBED): 101.2 ML
BH CV ECHO MEAS - EDV(MOD-SP4): 65.7 ML
BH CV ECHO MEAS - EDV(TEICH): 100.3 ML
BH CV ECHO MEAS - EF(CUBED): 75.1 %
BH CV ECHO MEAS - EF(MOD-SP4): 53.1 %
BH CV ECHO MEAS - EF(TEICH): 67.1 %
BH CV ECHO MEAS - ESV(CUBED): 25.2 ML
BH CV ECHO MEAS - ESV(MOD-SP4): 30.8 ML
BH CV ECHO MEAS - ESV(TEICH): 33 ML
BH CV ECHO MEAS - FS: 37.1 %
BH CV ECHO MEAS - IVS/LVPW: 1
BH CV ECHO MEAS - IVSD: 0.88 CM
BH CV ECHO MEAS - LA DIMENSION: 3.1 CM
BH CV ECHO MEAS - LA/AO: 0.94
BH CV ECHO MEAS - LAT PEAK E' VEL: 8.1 CM/SEC
BH CV ECHO MEAS - LV DIASTOLIC VOL/BSA (35-75): 34 ML/M^2
BH CV ECHO MEAS - LV MASS(C)D: 135.1 GRAMS
BH CV ECHO MEAS - LV MASS(C)DI: 69.9 GRAMS/M^2
BH CV ECHO MEAS - LV MAX PG: 3.5 MMHG
BH CV ECHO MEAS - LV MEAN PG: 2 MMHG
BH CV ECHO MEAS - LV SYSTOLIC VOL/BSA (12-30): 15.9 ML/M^2
BH CV ECHO MEAS - LV V1 MAX: 94.1 CM/SEC
BH CV ECHO MEAS - LV V1 MEAN: 62.1 CM/SEC
BH CV ECHO MEAS - LV V1 VTI: 17.7 CM
BH CV ECHO MEAS - LVIDD: 4.7 CM
BH CV ECHO MEAS - LVIDS: 2.9 CM
BH CV ECHO MEAS - LVLD AP4: 8.9 CM
BH CV ECHO MEAS - LVLS AP4: 7 CM
BH CV ECHO MEAS - LVOT AREA (M): 3.5 CM^2
BH CV ECHO MEAS - LVOT AREA: 3.5 CM^2
BH CV ECHO MEAS - LVOT DIAM: 2.1 CM
BH CV ECHO MEAS - LVPWD: 0.87 CM
BH CV ECHO MEAS - MED PEAK E' VEL: 9.14 CM/SEC
BH CV ECHO MEAS - MV A MAX VEL: 91.7 CM/SEC
BH CV ECHO MEAS - MV DEC TIME: 0.3 SEC
BH CV ECHO MEAS - MV E MAX VEL: 71.8 CM/SEC
BH CV ECHO MEAS - MV E/A: 0.78
BH CV ECHO MEAS - PA MAX PG: 1.9 MMHG
BH CV ECHO MEAS - PA V2 MAX: 68.4 CM/SEC
BH CV ECHO MEAS - RAP SYSTOLE: 5 MMHG
BH CV ECHO MEAS - RVSP: 27.8 MMHG
BH CV ECHO MEAS - SI(AO): 95.6 ML/M^2
BH CV ECHO MEAS - SI(CUBED): 39.3 ML/M^2
BH CV ECHO MEAS - SI(LVOT): 31.7 ML/M^2
BH CV ECHO MEAS - SI(MOD-SP4): 18.1 ML/M^2
BH CV ECHO MEAS - SI(TEICH): 34.8 ML/M^2
BH CV ECHO MEAS - SV(AO): 184.7 ML
BH CV ECHO MEAS - SV(CUBED): 76 ML
BH CV ECHO MEAS - SV(LVOT): 61.3 ML
BH CV ECHO MEAS - SV(MOD-SP4): 34.9 ML
BH CV ECHO MEAS - SV(TEICH): 67.3 ML
BH CV ECHO MEAS - TR MAX VEL: 239 CM/SEC
BH CV ECHO MEASUREMENTS AVERAGE E/E' RATIO: 8.33
LEFT ATRIUM VOLUME INDEX: 19 ML/M2
LEFT ATRIUM VOLUME: 36.7 CM3
MAXIMAL PREDICTED HEART RATE: 141 BPM
STRESS TARGET HR: 120 BPM

## 2018-08-22 DIAGNOSIS — D46.9 MYELODYSPLASIA (MYELODYSPLASTIC SYNDROME) (HCC): ICD-10-CM

## 2018-08-22 DIAGNOSIS — C73 THYROID CANCER (HCC): ICD-10-CM

## 2018-08-22 DIAGNOSIS — Z91.81 RISK FOR FALLS: ICD-10-CM

## 2018-08-22 DIAGNOSIS — R73.01 IFG (IMPAIRED FASTING GLUCOSE): ICD-10-CM

## 2018-08-22 DIAGNOSIS — I25.10 CORONARY ARTERY DISEASE INVOLVING NATIVE CORONARY ARTERY OF NATIVE HEART WITHOUT ANGINA PECTORIS: ICD-10-CM

## 2018-08-22 DIAGNOSIS — J43.9 PULMONARY EMPHYSEMA, UNSPECIFIED EMPHYSEMA TYPE (HCC): ICD-10-CM

## 2018-08-22 DIAGNOSIS — E89.0 HYPOTHYROIDISM, POSTOP: ICD-10-CM

## 2018-08-22 LAB
ALBUMIN SERPL-MCNC: 3.9 G/DL (ref 3.5–5.2)
ALP BLD-CCNC: 130 U/L (ref 40–130)
ALT SERPL-CCNC: 11 U/L (ref 5–41)
ANION GAP SERPL CALCULATED.3IONS-SCNC: 14 MMOL/L (ref 7–19)
AST SERPL-CCNC: 16 U/L (ref 5–40)
BILIRUB SERPL-MCNC: 0.6 MG/DL (ref 0.2–1.2)
BUN BLDV-MCNC: 17 MG/DL (ref 8–23)
CALCIUM SERPL-MCNC: 8.6 MG/DL (ref 8.8–10.2)
CHLORIDE BLD-SCNC: 101 MMOL/L (ref 98–111)
CO2: 24 MMOL/L (ref 22–29)
CREAT SERPL-MCNC: 0.9 MG/DL (ref 0.5–1.2)
GFR NON-AFRICAN AMERICAN: >60
GLUCOSE BLD-MCNC: 91 MG/DL (ref 74–109)
POTASSIUM SERPL-SCNC: 3.8 MMOL/L (ref 3.5–5)
SODIUM BLD-SCNC: 139 MMOL/L (ref 136–145)
TOTAL PROTEIN: 6.9 G/DL (ref 6.6–8.7)
TSH SERPL DL<=0.05 MIU/L-ACNC: 0.24 UIU/ML (ref 0.27–4.2)

## 2018-08-28 ENCOUNTER — OFFICE VISIT (OUTPATIENT)
Dept: INTERNAL MEDICINE | Age: 78
End: 2018-08-28
Payer: MEDICARE

## 2018-08-28 VITALS
BODY MASS INDEX: 22.82 KG/M2 | HEART RATE: 70 BPM | HEIGHT: 71 IN | DIASTOLIC BLOOD PRESSURE: 62 MMHG | SYSTOLIC BLOOD PRESSURE: 128 MMHG | RESPIRATION RATE: 16 BRPM | WEIGHT: 163 LBS | OXYGEN SATURATION: 96 %

## 2018-08-28 DIAGNOSIS — Z12.5 ENCOUNTER FOR SCREENING FOR MALIGNANT NEOPLASM OF PROSTATE: ICD-10-CM

## 2018-08-28 DIAGNOSIS — Z00.00 HEALTH CARE MAINTENANCE: ICD-10-CM

## 2018-08-28 DIAGNOSIS — M89.9 DISORDER OF BONE: ICD-10-CM

## 2018-08-28 DIAGNOSIS — G89.4 CHRONIC PAIN SYNDROME: ICD-10-CM

## 2018-08-28 DIAGNOSIS — D46.9 MYELODYSPLASIA (MYELODYSPLASTIC SYNDROME) (HCC): ICD-10-CM

## 2018-08-28 DIAGNOSIS — Z91.81 RISK FOR FALLS: ICD-10-CM

## 2018-08-28 DIAGNOSIS — E89.0 HYPOTHYROIDISM, POSTOP: ICD-10-CM

## 2018-08-28 DIAGNOSIS — J43.9 PULMONARY EMPHYSEMA, UNSPECIFIED EMPHYSEMA TYPE (HCC): ICD-10-CM

## 2018-08-28 DIAGNOSIS — C73 THYROID CANCER (HCC): Primary | ICD-10-CM

## 2018-08-28 PROCEDURE — G8598 ASA/ANTIPLAT THER USED: HCPCS | Performed by: NURSE PRACTITIONER

## 2018-08-28 PROCEDURE — 99214 OFFICE O/P EST MOD 30 MIN: CPT | Performed by: NURSE PRACTITIONER

## 2018-08-28 PROCEDURE — 4040F PNEUMOC VAC/ADMIN/RCVD: CPT | Performed by: NURSE PRACTITIONER

## 2018-08-28 PROCEDURE — G8926 SPIRO NO PERF OR DOC: HCPCS | Performed by: NURSE PRACTITIONER

## 2018-08-28 PROCEDURE — G8427 DOCREV CUR MEDS BY ELIG CLIN: HCPCS | Performed by: NURSE PRACTITIONER

## 2018-08-28 PROCEDURE — 1036F TOBACCO NON-USER: CPT | Performed by: NURSE PRACTITIONER

## 2018-08-28 PROCEDURE — 1101F PT FALLS ASSESS-DOCD LE1/YR: CPT | Performed by: NURSE PRACTITIONER

## 2018-08-28 PROCEDURE — 3023F SPIROM DOC REV: CPT | Performed by: NURSE PRACTITIONER

## 2018-08-28 PROCEDURE — 1123F ACP DISCUSS/DSCN MKR DOCD: CPT | Performed by: NURSE PRACTITIONER

## 2018-08-28 PROCEDURE — G8420 CALC BMI NORM PARAMETERS: HCPCS | Performed by: NURSE PRACTITIONER

## 2018-08-28 RX ORDER — LEVOTHYROXINE SODIUM 0.12 MG/1
125 TABLET ORAL
COMMUNITY
End: 2019-01-01 | Stop reason: SDUPTHER

## 2018-08-28 RX ORDER — ACETAMINOPHEN AND CODEINE PHOSPHATE 300; 30 MG/1; MG/1
1 TABLET ORAL 3 TIMES DAILY PRN
Qty: 90 TABLET | Refills: 0 | Status: SHIPPED | OUTPATIENT
Start: 2018-08-28 | End: 2018-09-27

## 2018-08-28 ASSESSMENT — ENCOUNTER SYMPTOMS
BLOOD IN STOOL: 0
ABDOMINAL PAIN: 0
COLOR CHANGE: 0
NAUSEA: 0
SORE THROAT: 0
VOMITING: 0
COUGH: 1
CONSTIPATION: 0
SHORTNESS OF BREATH: 1
ABDOMINAL DISTENTION: 0
DIARRHEA: 0
STRIDOR: 0
EYE ITCHING: 0
TROUBLE SWALLOWING: 0
CHOKING: 0
EYE DISCHARGE: 0
WHEEZING: 0

## 2018-08-28 ASSESSMENT — PATIENT HEALTH QUESTIONNAIRE - PHQ9
SUM OF ALL RESPONSES TO PHQ QUESTIONS 1-9: 0
SUM OF ALL RESPONSES TO PHQ QUESTIONS 1-9: 0
SUM OF ALL RESPONSES TO PHQ9 QUESTIONS 1 & 2: 0
1. LITTLE INTEREST OR PLEASURE IN DOING THINGS: 0
2. FEELING DOWN, DEPRESSED OR HOPELESS: 0

## 2018-08-28 NOTE — PATIENT INSTRUCTIONS
1. hypothyroidism ; surgically absent for thyroid cancer  Only take your levothyroxine 6 days a week  2. COPD  Stable   3. Macular dengeneration ; Stable with Dr Mia Villalobos  4. MDS;  Stable with Dr Collette Bison   5.   Chronic pain syndrome;  Stable on current dose of tylenol with Codeine   Fu in 6 months with luke

## 2018-08-28 NOTE — PROGRESS NOTES
[facility]  [address] [facility]  [address] [place] [address]  Dept: [phone]  Dept Fax: [phone]  Loc: [phone]    Rashmi Shipley is a 77 y.o. male who presents today for his medical conditions/complaints as noted below. Rashmi Shipley is c/o of Hypothyroidism (Patient here for routine follow up visit and review of labs.)        HPI:     HPI   1. Hypothyroid- level was low at last visit and he had instructions to take 1/2 2 days a week but he did not do that ; he is going to   2. COPD Dr Michelle Gonzales ; gets meds from him;    3. Macular degeneration  Dr Guero Tinoco ; His vision continues to get worse; He cannot drive or see TV; He can only make out figures   4. MDS;  Dr Everette Frazier; He has told him he is stable for now;    5. Nocturnal hypoxia; Now on night time oxygen    6,  Chronic pain syndrome;  Stable on current meds   Chief Complaint   Patient presents with    Hypothyroidism     Patient here for routine follow up visit and review of labs.        Past Medical History:   Diagnosis Date    CAD (coronary artery disease) 2010    COPD (chronic obstructive pulmonary disease) ([facility] [address] 1990    Hypothyroidism, postop 1974    Myelodysplasia (myelodysplastic syndrome) ([facility] [address] 2014    chr anemia related to B12, iron def, and myelodysplasis - Dr Shannan Gambino follows     Thyroid cancer [facility]) 1974    radical right neck dissection , thyroidectomy - [id number]       Past Surgical History:   Procedure Laterality Date    EYE SURGERY         Vitals 8/28/2018 4/25/2018 4/25/2018 10/23/2017 10/23/2017 09/37/1104   SYSTOLIC 664 009 [id number] [id number]   DIASTOLIC 62 82 80 80 78 80   Pulse 70 - 77 - - 79   Resp 16 - - - - -   Weight 163 lb - 171 lb - - 176 lb   Height 5' 11\" - 5' 11\" - - 5' 11\"   BMI (wt*703/ht~2) 22.73 kg/m2 - 23.85 kg/m2 - - 24.54 kg/m2       Family History   Problem Relation Age of Onset    Family history unknown: Yes       Social History   Substance Use Topics    Smoking stool, constipation, diarrhea, nausea and vomiting. Endocrine: Negative for cold intolerance, polydipsia and polyuria. Genitourinary: Negative for difficulty urinating, dysuria, frequency and urgency. Musculoskeletal: Negative for arthralgias and gait problem. Skin: Negative for color change and rash. Allergic/Immunologic: Negative for food allergies and immunocompromised state. Neurological: Positive for weakness. Negative for dizziness, tremors, syncope, speech difficulty and headaches. Hematological: Negative for adenopathy. Does not bruise/bleed easily. Psychiatric/Behavioral: Negative for confusion and hallucinations. Objective:     Physical Exam   Constitutional: He is oriented to person, place, and time. He appears well-developed and well-nourished. No distress. HENT:   Head: Normocephalic and atraumatic. Eyes: Pupils are equal, round, and reactive to light. Right eye exhibits no discharge. Left eye exhibits no discharge. No scleral icterus. Neck: Normal range of motion. Neck supple. No JVD present. No thyromegaly present. Cardiovascular: Normal rate, regular rhythm and normal heart sounds. No murmur heard. Pulmonary/Chest: Effort normal and breath sounds normal. No respiratory distress. He has no wheezes. He has no rales. Abdominal: Soft. Bowel sounds are normal. He exhibits no distension and no mass. There is no tenderness. There is no rebound and no guarding. Musculoskeletal: Normal range of motion. He exhibits no edema or tenderness. Neurological: He is alert and oriented to person, place, and time. He has normal reflexes. No cranial nerve deficit. Coordination normal.   Skin: Skin is warm and dry. No rash noted. No erythema. Psychiatric: His behavior is normal. Judgment and thought content normal. He does not exhibit a depressed mood.      /62   Pulse 70   Resp 16   Ht 5' 11\" (1.803 m)   Wt 163 lb (73.9 kg)   SpO2 96%   BMI 22.73 kg/m²     Assessment: Diagnosis Orders   1. Thyroid cancer ([facility] [address]  TSH without Reflex   2. Myelodysplasia (myelodysplastic syndrome) ([facility]     3. Pulmonary emphysema, unspecified emphysema type ([facility]     4. Hypothyroidism, postop  TSH without Reflex   5. Risk for falls     6. Health care maintenance  CBC    Comprehensive Metabolic Panel    Lipid Panel    Psa screening    TSH without Reflex    Vitamin D 25 Hydroxy   7. Encounter for screening for malignant neoplasm of prostate   Psa screening   8. Disorder of bone   Vitamin D 25 Hydroxy   9. Chronic pain syndrome  acetaminophen-codeine (TYLENOL #3) 300-30 MG per tablet     Labs reviewed from 8/20/18    Plan:        Patient given educational materials - see patient instructions. Discussed use, benefit, and side effects of prescribed medications. All patient questions answered. Pt voiced understanding. Reviewed health maintenance. Instructed to continue current medications, diet and exercise. Patient agreed with treatment plan. Follow up as directed. MEDICATIONS:  Orders Placed This Encounter   Medications    acetaminophen-codeine (TYLENOL #3) 300-30 MG per tablet     Sig: Take 1 tablet by mouth 3 times daily as needed for Pain for up to 30 days. .     Dispense:  90 tablet     Refill:  0         ORDERS:  Orders Placed This Encounter   Procedures    CBC    Comprehensive Metabolic Panel    Lipid Panel    Psa screening    TSH without Reflex    Vitamin D 25 Hydroxy       Follow-up:  Return for yearly physical, have labs done prior to appt. PATIENT INSTRUCTIONS:  Patient Instructions   1.hypothyroidism ; surgically absent for thyroid cancer  Only take your levothyroxine 6 days a week  2. COPD  Stable   3. Macular dengeneration ; Stable with Dr Linden Owens  4. MDS;  Stable with Dr Ramos Atkinson   5.   Chronic pain syndrome;  Stable on current dose of tylenol with Codeine   Fu in 6 months with luke    Electronically signed by BHUPENDRA Vyas on 8/28/2018 at 1:55 PM    EMR Dragon/transcription disclaimer:  Much of this encounter note is electronic transcription/translation of spoken language to printed texts. The electronic translation of spoken language may be erroneous, or at times, nonsensical words or phrases may be inadvertently transcribed.   Although I have reviewed the note for such errors, some may still exist.

## 2018-09-17 RX ORDER — CEFDINIR 300 MG/1
300 CAPSULE ORAL 2 TIMES DAILY
Qty: 14 CAPSULE | Refills: 0 | Status: SHIPPED | OUTPATIENT
Start: 2018-09-17 | End: 2018-09-24

## 2018-10-01 ENCOUNTER — HOSPITAL ENCOUNTER (EMERGENCY)
Facility: HOSPITAL | Age: 78
Discharge: HOME | End: 2018-10-01
Attending: EMERGENCY MEDICINE | Admitting: EMERGENCY MEDICINE

## 2018-10-01 VITALS
DIASTOLIC BLOOD PRESSURE: 74 MMHG | SYSTOLIC BLOOD PRESSURE: 144 MMHG | OXYGEN SATURATION: 94 % | TEMPERATURE: 98.2 F | RESPIRATION RATE: 15 BRPM | WEIGHT: 163 LBS | BODY MASS INDEX: 22.82 KG/M2 | HEIGHT: 71 IN | HEART RATE: 83 BPM

## 2018-10-01 DIAGNOSIS — S61.412A LACERATION OF LEFT HAND WITHOUT FOREIGN BODY, INITIAL ENCOUNTER: Primary | ICD-10-CM

## 2018-10-01 PROCEDURE — 90471 IMMUNIZATION ADMIN: CPT | Performed by: EMERGENCY MEDICINE

## 2018-10-01 PROCEDURE — 25010000002 TDAP 5-2.5-18.5 LF-MCG/0.5 SUSPENSION: Performed by: EMERGENCY MEDICINE

## 2018-10-01 PROCEDURE — 90715 TDAP VACCINE 7 YRS/> IM: CPT | Performed by: EMERGENCY MEDICINE

## 2018-10-01 PROCEDURE — 99283 EMERGENCY DEPT VISIT LOW MDM: CPT

## 2018-10-01 RX ORDER — LIDOCAINE HYDROCHLORIDE AND EPINEPHRINE 10; 10 MG/ML; UG/ML
10 INJECTION, SOLUTION INFILTRATION; PERINEURAL ONCE
Status: COMPLETED | OUTPATIENT
Start: 2018-10-01 | End: 2018-10-01

## 2018-10-01 RX ADMIN — TETANUS TOXOID, REDUCED DIPHTHERIA TOXOID AND ACELLULAR PERTUSSIS VACCINE, ADSORBED 0.5 ML: 5; 2.5; 8; 8; 2.5 SUSPENSION INTRAMUSCULAR at 19:33

## 2018-10-01 RX ADMIN — LIDOCAINE HYDROCHLORIDE AND EPINEPHRINE 10 ML: 10; 10 INJECTION, SOLUTION INFILTRATION; PERINEURAL at 19:20

## 2018-10-01 NOTE — ED PROVIDER NOTES
Subjective    History of Present Illness   78-year-old male presenting with laceration to his left hand.    Patient was attempting to sharpen the blades on his lawnmower with a .  He slipped and cut the dorsum of his left hand on the .  Patient immediately washed the wound with soap and water, put some hydrogen peroxide, and tried to apply pressure, but the bleeding continued.  This prompted patient to seek care in the emergency department.    Laceration occurred approximately 4 hours prior to arrival.    Patient takes one baby aspirin daily but no other blood thinner.  Patient denies any weakness, numbness, tingling, chest pain, dizziness, lightheadedness.    Unknown tetanus status    Review of Systems   Constitutional: Negative for fever.   HENT: Negative for nosebleeds.    Eyes: Negative for redness.   Respiratory: Negative for shortness of breath.    Cardiovascular: Negative for chest pain.   Gastrointestinal: Negative for abdominal pain.   Genitourinary: Negative for flank pain.   Musculoskeletal: Negative for gait problem.   Skin: Positive for wound.   Neurological: Negative for syncope and weakness.   Psychiatric/Behavioral: The patient is not hyperactive.        Past Medical History:   Diagnosis Date   • Arthritis    • Bruises easily    • COPD (chronic obstructive pulmonary disease) (CMS/HCC)    • Coronary artery disease    • Disease of thyroid gland    • Hearing aid worn    • Hypercholesteremia    • Leukemia (CMS/[facility])    • Macular degeneration (senile) of retina    • Thyroid cancer (CMS/[facility]) 1974   • Wears dentures        No Known Allergies    Past Surgical History:   Procedure Laterality Date   • CORONARY ARTERY BYPASS GRAFT  2005    x4   • EAR MASTOIDECTOMY W/ COCHLEAR IMPLANT W/ LANDMARK Right 2003   • THYROID SURGERY     • TOTAL THYROIDECTOMY  1974       Family History   Problem Relation Age of Onset   • Dementia Mother    • Heart failure Father    • Scoliosis Sister    • Dementia Brother     • Esophageal varices Son    • No Known Problems Brother    • Depression Son    • Suicidality Son    • Obesity Son        Social History     Social History   • Marital status:      Social History Main Topics   • Smoking status: Former Smoker     Quit date: 2015   • Smokeless tobacco: Never Used      Comment: uses vape   • Alcohol use No   • Drug use: No   • Sexual activity: Defer     Other Topics Concern   • Not on file           Objective   Physical Exam   Constitutional: He is oriented to person, place, and time. He appears well-developed and well-nourished.   HENT:   Head: Normocephalic and atraumatic.   Eyes: Conjunctivae are normal.   Neck: Normal range of motion.   Cardiovascular: Normal rate.    Pulmonary/Chest: Effort normal.   Abdominal: He exhibits no distension.   Musculoskeletal: He exhibits no edema.   4 cm laceration along the dorsum of the left hand, slow venous oozing  Patient has 5 out of 5  strength, normal thumb apposition and opposition  Sensation to light touch intact throughout the hand  Capillary Refill less than 2 seconds   Neurological: He is alert and oriented to person, place, and time.   Skin: Skin is warm and dry. Capillary refill takes less than 2 seconds.   Psychiatric: He has a normal mood and affect.       Laceration Repair  Date/Time: 10/1/2018 7:34 PM  Performed by: JEREMI RODRIGUEZ  Authorized by: JEREMI RODRIGUEZ     Consent:     Consent obtained:  Verbal    Consent given by:  Patient    Alternatives discussed:  No treatment  Universal protocol:     Immediately prior to procedure, a time out was called: yes      Patient identity confirmed:  Verbally with patient and arm band  Anesthesia (see MAR for exact dosages):     Anesthesia method:  Local infiltration    Local anesthetic:  Lidocaine 1% WITH epi  Laceration details:     Location:  Hand    Hand location:  L hand, dorsum    Length (cm):  4    Depth (mm):  3  Repair type:     Repair type:   Simple  Pre-procedure details:     Preparation:  Patient was prepped and draped in usual sterile fashion and imaging obtained to evaluate for foreign bodies  Exploration:     Hemostasis achieved with:  Direct pressure    Wound exploration: wound explored through full range of motion and entire depth of wound probed and visualized      Wound extent: no foreign bodies/material noted, no nerve damage noted, no tendon damage noted and no vascular damage noted      Contaminated: no    Treatment:     Area cleansed with:  Saline    Amount of cleaning:  Standard    Irrigation solution:  Sterile saline    Irrigation volume:  250    Irrigation method:  Syringe and pressure wash    Visualized foreign bodies/material removed: no    Skin repair:     Repair method:  Sutures    Suture size:  4-0    Suture material:  Nylon    Suture technique:  Simple interrupted    Number of sutures:  2  Approximation:     Approximation:  Close    Vermilion border: well-aligned    Post-procedure details:     Dressing:  Non-adherent dressing    Patient tolerance of procedure:  Tolerated well, no immediate complications               ED Course                  MDM  Number of Diagnoses or Management Options  Diagnosis management comments: 78-year-old male presenting with simple laceration over the dorsum of his left hand.  No gross contamination.  Irrigated and sutured in the usual fashion.  Tetanus updated.  Patient will be discharged with return precautions for infection.  Sutures should come out in 7-10 days.    Patient Progress  Patient progress: improved        Final diagnoses:   Laceration of left hand without foreign body, initial encounter            Dontae Lee MD  10/02/18 [id number]

## 2018-10-10 ENCOUNTER — HOSPITAL ENCOUNTER (OUTPATIENT)
Dept: SLEEP CENTER | Age: 78
Discharge: HOME | End: 2018-10-12
Payer: MEDICARE

## 2018-10-10 PROCEDURE — 95810 POLYSOM 6/> YRS 4/> PARAM: CPT

## 2018-10-10 PROCEDURE — 95810 POLYSOM 6/> YRS 4/> PARAM: CPT | Performed by: PSYCHIATRY & NEUROLOGY

## 2018-10-26 ENCOUNTER — TRANSCRIBE ORDERS (OUTPATIENT)
Dept: ADMINISTRATIVE | Facility: HOSPITAL | Age: 78
End: 2018-10-26

## 2018-10-26 DIAGNOSIS — R63.4 UNEXPLAINED WEIGHT LOSS: Primary | ICD-10-CM

## 2018-10-27 DIAGNOSIS — G47.33 OBSTRUCTIVE SLEEP APNEA: Primary | ICD-10-CM

## 2018-11-01 ENCOUNTER — TRANSCRIBE ORDERS (OUTPATIENT)
Dept: ADMINISTRATIVE | Facility: HOSPITAL | Age: 78
End: 2018-11-01

## 2018-11-01 DIAGNOSIS — R63.4 UNEXPLAINED WEIGHT LOSS: Primary | ICD-10-CM

## 2018-11-02 ENCOUNTER — HOSPITAL ENCOUNTER (OUTPATIENT)
Dept: GENERAL RADIOLOGY | Facility: HOSPITAL | Age: 78
Discharge: HOME | End: 2018-11-02
Attending: INTERNAL MEDICINE

## 2018-11-02 ENCOUNTER — APPOINTMENT (OUTPATIENT)
Dept: CT IMAGING | Facility: HOSPITAL | Age: 78
End: 2018-11-02
Attending: INTERNAL MEDICINE

## 2018-11-02 ENCOUNTER — TRANSCRIBE ORDERS (OUTPATIENT)
Dept: ADMINISTRATIVE | Facility: HOSPITAL | Age: 78
End: 2018-11-02

## 2018-11-02 ENCOUNTER — HOSPITAL ENCOUNTER (OUTPATIENT)
Dept: ULTRASOUND IMAGING | Facility: HOSPITAL | Age: 78
Discharge: HOME | End: 2018-11-02
Attending: INTERNAL MEDICINE | Admitting: INTERNAL MEDICINE

## 2018-11-02 DIAGNOSIS — R63.4 UNEXPLAINED WEIGHT LOSS: Primary | ICD-10-CM

## 2018-11-02 DIAGNOSIS — R63.4 UNEXPLAINED WEIGHT LOSS: ICD-10-CM

## 2018-11-02 PROCEDURE — 76700 US EXAM ABDOM COMPLETE: CPT

## 2018-11-02 PROCEDURE — 71046 X-RAY EXAM CHEST 2 VIEWS: CPT

## 2018-11-06 ENCOUNTER — TRANSCRIBE ORDERS (OUTPATIENT)
Dept: ADMINISTRATIVE | Facility: HOSPITAL | Age: 78
End: 2018-11-06

## 2018-11-06 ENCOUNTER — TRANSCRIBE ORDERS (OUTPATIENT)
Dept: ONCOLOGY | Facility: HOSPITAL | Age: 78
End: 2018-11-06

## 2018-11-06 DIAGNOSIS — R16.0 LIVER MASS: ICD-10-CM

## 2018-11-06 DIAGNOSIS — D46.9 MYELODYSPLASTIC SYNDROME (HCC): Primary | ICD-10-CM

## 2018-11-06 DIAGNOSIS — R91.1 LUNG NODULE: Primary | ICD-10-CM

## 2018-11-09 ENCOUNTER — LAB (OUTPATIENT)
Dept: LAB | Facility: HOSPITAL | Age: 78
End: 2018-11-09

## 2018-11-09 ENCOUNTER — INFUSION (OUTPATIENT)
Dept: ONCOLOGY | Facility: HOSPITAL | Age: 78
End: 2018-11-09

## 2018-11-09 VITALS
WEIGHT: 163 LBS | TEMPERATURE: 97.9 F | SYSTOLIC BLOOD PRESSURE: 137 MMHG | RESPIRATION RATE: 20 BRPM | OXYGEN SATURATION: 99 % | DIASTOLIC BLOOD PRESSURE: 67 MMHG | BODY MASS INDEX: 22.82 KG/M2 | HEART RATE: 75 BPM | HEIGHT: 71 IN

## 2018-11-09 DIAGNOSIS — D46.9 MDS (MYELODYSPLASTIC SYNDROME) (HCC): Primary | ICD-10-CM

## 2018-11-09 DIAGNOSIS — D46.9 MYELODYSPLASTIC SYNDROME (HCC): Primary | ICD-10-CM

## 2018-11-09 LAB
BASOPHILS # BLD AUTO: 0.03 10*3/MM3 (ref 0–0.2)
BASOPHILS NFR BLD AUTO: 0.6 % (ref 0–2)
DEPRECATED RDW RBC AUTO: 93.4 FL (ref 40–54)
EOSINOPHIL # BLD AUTO: 0.18 10*3/MM3 (ref 0–0.7)
EOSINOPHIL NFR BLD AUTO: 3.4 % (ref 0–4)
ERYTHROCYTE [DISTWIDTH] IN BLOOD BY AUTOMATED COUNT: 23.3 % (ref 12–15)
HCT VFR BLD AUTO: 27.6 % (ref 40–52)
HGB BLD-MCNC: 8.6 G/DL (ref 14–18)
HOLD SPECIMEN: NORMAL
HOLD SPECIMEN: NORMAL
IMM GRANULOCYTES # BLD: 0.04 10*3/MM3 (ref 0–0.03)
IMM GRANULOCYTES NFR BLD: 0.8 % (ref 0–5)
LYMPHOCYTES # BLD AUTO: 1.62 10*3/MM3 (ref 0.72–4.86)
LYMPHOCYTES NFR BLD AUTO: 30.9 % (ref 15–45)
MCH RBC QN AUTO: 34.3 PG (ref 28–32)
MCHC RBC AUTO-ENTMCNC: 31.2 G/DL (ref 33–36)
MCV RBC AUTO: 110 FL (ref 82–95)
MONOCYTES # BLD AUTO: 0.43 10*3/MM3 (ref 0.19–1.3)
MONOCYTES NFR BLD AUTO: 8.2 % (ref 4–12)
NEUTROPHILS # BLD AUTO: 2.94 10*3/MM3 (ref 1.87–8.4)
NEUTROPHILS NFR BLD AUTO: 56.1 % (ref 39–78)
NRBC BLD MANUAL-RTO: 0.6 /100 WBC (ref 0–0)
PLATELET # BLD AUTO: 96 10*3/MM3 (ref 130–400)
RBC # BLD AUTO: 2.51 10*6/MM3 (ref 4.8–5.9)
WBC NRBC COR # BLD: 5.24 10*3/MM3 (ref 4.8–10.8)

## 2018-11-09 PROCEDURE — 63510000001 EPOETIN ALFA PER 1000 UNITS: Performed by: INTERNAL MEDICINE

## 2018-11-09 PROCEDURE — 36415 COLL VENOUS BLD VENIPUNCTURE: CPT

## 2018-11-09 PROCEDURE — 96372 THER/PROPH/DIAG INJ SC/IM: CPT

## 2018-11-09 PROCEDURE — 85025 COMPLETE CBC W/AUTO DIFF WBC: CPT | Performed by: INTERNAL MEDICINE

## 2018-11-09 RX ADMIN — ERYTHROPOIETIN 40000 UNITS: 40000 INJECTION, SOLUTION INTRAVENOUS; SUBCUTANEOUS at 10:39

## 2018-11-15 ENCOUNTER — HOSPITAL ENCOUNTER (OUTPATIENT)
Dept: CT IMAGING | Facility: HOSPITAL | Age: 78
Discharge: HOME | End: 2018-11-15
Attending: INTERNAL MEDICINE | Admitting: INTERNAL MEDICINE

## 2018-11-15 ENCOUNTER — TRANSCRIBE ORDERS (OUTPATIENT)
Dept: ADMINISTRATIVE | Facility: HOSPITAL | Age: 78
End: 2018-11-15

## 2018-11-15 DIAGNOSIS — R16.0 LIVER MASS: ICD-10-CM

## 2018-11-15 DIAGNOSIS — R16.0 LIVER MASS: Primary | ICD-10-CM

## 2018-11-15 DIAGNOSIS — R91.1 LUNG NODULE: ICD-10-CM

## 2018-11-15 LAB — CREAT BLDA-MCNC: 1.2 MG/DL (ref 0.6–1.3)

## 2018-11-15 PROCEDURE — 82565 ASSAY OF CREATININE: CPT

## 2018-11-21 ENCOUNTER — TRANSCRIBE ORDERS (OUTPATIENT)
Dept: ADMINISTRATIVE | Facility: HOSPITAL | Age: 78
End: 2018-11-21

## 2018-11-21 ENCOUNTER — HOSPITAL ENCOUNTER (OUTPATIENT)
Dept: CT IMAGING | Facility: HOSPITAL | Age: 78
Discharge: HOME | End: 2018-11-21
Attending: INTERNAL MEDICINE | Admitting: INTERNAL MEDICINE

## 2018-11-21 ENCOUNTER — LAB (OUTPATIENT)
Dept: LAB | Facility: HOSPITAL | Age: 78
End: 2018-11-21
Attending: INTERNAL MEDICINE

## 2018-11-21 DIAGNOSIS — D64.9 ANEMIA, UNSPECIFIED TYPE: Primary | ICD-10-CM

## 2018-11-21 DIAGNOSIS — D64.9 ANEMIA, UNSPECIFIED TYPE: ICD-10-CM

## 2018-11-21 DIAGNOSIS — R16.0 LIVER MASS: ICD-10-CM

## 2018-11-21 LAB
ALBUMIN SERPL-MCNC: 4 G/DL (ref 3.5–5)
ALBUMIN/GLOB SERPL: 1.3 G/DL (ref 1.1–2.5)
ALP SERPL-CCNC: 98 U/L (ref 24–120)
ALT SERPL W P-5'-P-CCNC: 17 U/L (ref 0–54)
ANION GAP SERPL CALCULATED.3IONS-SCNC: 12 MMOL/L (ref 4–13)
AST SERPL-CCNC: 56 U/L (ref 7–45)
BILIRUB SERPL-MCNC: 1 MG/DL (ref 0.1–1)
BUN BLD-MCNC: 16 MG/DL (ref 5–21)
BUN/CREAT SERPL: 15 (ref 7–25)
CALCIUM SPEC-SCNC: 9 MG/DL (ref 8.4–10.4)
CHLORIDE SERPL-SCNC: 100 MMOL/L (ref 98–110)
CO2 SERPL-SCNC: 27 MMOL/L (ref 24–31)
CREAT BLD-MCNC: 1.07 MG/DL (ref 0.5–1.4)
CREAT BLDA-MCNC: 1.2 MG/DL (ref 0.6–1.3)
FERRITIN SERPL-MCNC: 670 NG/ML (ref 17.9–464)
GFR SERPL CREATININE-BSD FRML MDRD: 67 ML/MIN/1.73
GLOBULIN UR ELPH-MCNC: 3.1 GM/DL
GLUCOSE BLD-MCNC: 87 MG/DL (ref 70–100)
IRON 24H UR-MRATE: 152 MCG/DL (ref 42–180)
IRON SATN MFR SERPL: 64 % (ref 20–45)
POTASSIUM BLD-SCNC: 3.7 MMOL/L (ref 3.5–5.3)
PROT SERPL-MCNC: 7.1 G/DL (ref 6.3–8.7)
SODIUM BLD-SCNC: 139 MMOL/L (ref 135–145)
TIBC SERPL-MCNC: 238 MCG/DL (ref 225–420)

## 2018-11-21 PROCEDURE — 83550 IRON BINDING TEST: CPT | Performed by: INTERNAL MEDICINE

## 2018-11-21 PROCEDURE — 71260 CT THORAX DX C+: CPT

## 2018-11-21 PROCEDURE — 80053 COMPREHEN METABOLIC PANEL: CPT | Performed by: INTERNAL MEDICINE

## 2018-11-21 PROCEDURE — 82565 ASSAY OF CREATININE: CPT

## 2018-11-21 PROCEDURE — 83540 ASSAY OF IRON: CPT | Performed by: INTERNAL MEDICINE

## 2018-11-21 PROCEDURE — 36415 COLL VENOUS BLD VENIPUNCTURE: CPT

## 2018-11-21 PROCEDURE — 82728 ASSAY OF FERRITIN: CPT | Performed by: INTERNAL MEDICINE

## 2018-11-21 PROCEDURE — 25010000002 IOPAMIDOL 61 % SOLUTION: Performed by: INTERNAL MEDICINE

## 2018-11-21 PROCEDURE — 74178 CT ABD&PLV WO CNTR FLWD CNTR: CPT

## 2018-11-21 RX ADMIN — IOPAMIDOL 100 ML: 612 INJECTION, SOLUTION INTRAVENOUS at 09:15

## 2018-11-29 PROBLEM — J98.4 SCARRING OF LUNG: Status: ACTIVE | Noted: 2018-11-29

## 2018-11-29 PROBLEM — Z87.891 FORMER SMOKER: Status: ACTIVE | Noted: 2018-11-29

## 2018-11-29 PROBLEM — J43.2 CENTRILOBULAR EMPHYSEMA (HCC): Status: ACTIVE | Noted: 2018-11-29

## 2018-11-29 PROBLEM — J44.9 STAGE 2 MODERATE COPD BY GOLD CLASSIFICATION (HCC): Status: ACTIVE | Noted: 2018-11-29

## 2018-11-29 PROBLEM — G47.34 NOCTURNAL HYPOXIA: Status: ACTIVE | Noted: 2018-11-29

## 2018-11-30 ENCOUNTER — OFFICE VISIT (OUTPATIENT)
Dept: PULMONOLOGY | Facility: CLINIC | Age: 78
End: 2018-11-30

## 2018-11-30 VITALS
BODY MASS INDEX: 22.4 KG/M2 | WEIGHT: 160 LBS | OXYGEN SATURATION: 98 % | HEART RATE: 102 BPM | HEIGHT: 71 IN | SYSTOLIC BLOOD PRESSURE: 122 MMHG | DIASTOLIC BLOOD PRESSURE: 70 MMHG

## 2018-11-30 DIAGNOSIS — J44.9 STAGE 2 MODERATE COPD BY GOLD CLASSIFICATION (HCC): Primary | ICD-10-CM

## 2018-11-30 DIAGNOSIS — G47.34 NOCTURNAL HYPOXIA: ICD-10-CM

## 2018-11-30 DIAGNOSIS — J98.4 SCARRING OF LUNG: ICD-10-CM

## 2018-11-30 DIAGNOSIS — Z87.891 FORMER SMOKER: ICD-10-CM

## 2018-11-30 DIAGNOSIS — J43.2 CENTRILOBULAR EMPHYSEMA (HCC): ICD-10-CM

## 2018-11-30 PROBLEM — H91.93 BILATERAL HEARING LOSS: Status: ACTIVE | Noted: 2018-11-30

## 2018-11-30 PROBLEM — J43.9 BULLOUS EMPHYSEMA (HCC): Status: ACTIVE | Noted: 2018-11-30

## 2018-11-30 PROCEDURE — 99214 OFFICE O/P EST MOD 30 MIN: CPT | Performed by: NURSE PRACTITIONER

## 2018-11-30 RX ORDER — SIMVASTATIN 20 MG
20 TABLET ORAL DAILY
Refills: 2 | COMMUNITY
Start: 2018-09-22 | End: 2019-11-08

## 2018-11-30 RX ORDER — ACETAMINOPHEN AND CODEINE PHOSPHATE 300; 30 MG/1; MG/1
TABLET ORAL
Refills: 0 | Status: ON HOLD | COMMUNITY
Start: 2018-08-28 | End: 2019-07-28

## 2018-11-30 NOTE — PROGRESS NOTES
[facility]  [facility]   Res[facility] Disease Clinic  [address] [place] [address]  Phone: [phone]  Fax: [phone]     Miguel Asif is a 78 y.o. male.   CC:   Chief Complaint   Patient presents with   • COPD        HPI: Mr. Asif is here for follow-up to discuss results of a recent CT scan.  He has an extensive medical history including moderate COPD, severe emphysema, lung scarring, nocturnal hypoxia, diastolic dysfunction, a form of leukemia, a history of thyroid surgery that was treated with radical neck surgery in 1974 and being a previous smoker.  He recently had a sleep study and says that he did not qualify for the machine.  He had a CT of the chest done on 11-20 1-18 showing chronic changes, severe bullous emphysema and a masslike area in the right lung that may be scarring but a neoplasm cannot be ruled out by the scan alone.  I reviewed the results of the scan with the patient and his wife and recommended a PET scan but the patient's wife wanted to know if he would instead be a candidate to have a bronchoscopy done.  Dr. Farfan is currently out on medical leave and I told the patient and his wife that Dr. Burnett would need to review the scan to make that decision.  They understand that the patient is a high risk to have complications with any type of invasive workup.  Especially given that the patient had a partial collapse of the right lung in the past and required a chest tube.  The patient is followed by Dr. Martinez for his oncology needs and is currently taking Procrit injections weekly.  He had a CT of the abdomen on the same day that he had the chest CT to evaluate a possible mass on his liver that came back as appearing to be a large cyst.  There was also a 10 mm nodule noted in the right adrenal gland that is not clearly defined and it is recommended that the patient have follow-up for that as well.  I told the patient and his wife that I will defer any  results from the CT of the abdomen to his PCP and/or oncologist.  He is followed by [facility] for routine medical care and he is up-to-date on his flu and pneumonia vaccines.    The following portions of the patient's history were reviewed and updated as appropriate: allergies, current medications, past family history, past medical history, past social history, past surgical history and problem list.    Past Medical History:   Diagnosis Date   • Arthritis    • Bilateral hearing loss 11/30/2018   • Bruises easily    • Bullous emphysema (CMS/HCC)    • Bullous emphysema (CMS/HCC) 11/30/2018   • COPD (chronic obstructive pulmonary disease) (CMS/HCC)    • COPD (chronic obstructive pulmonary disease) (CMS/HCC)    • Coronary artery disease    • Disease of thyroid gland    • Hearing aid worn    • Hypercholesteremia    • Leukemia (CMS/HCC)    • Macular degeneration (senile) of retina    • Thyroid cancer (CMS/HCC) 1974   • Thyroid cancer (CMS/HCC)    • Wears dentures        Family History   Problem Relation Age of Onset   • Dementia Mother    • Heart failure Father    • Scoliosis Sister    • Dementia Brother    • Esophageal varices Son    • No Known Problems Brother    • Depression Son    • Suicidality Son    • Obesity Son        Social History     Socioeconomic History   • Marital status:      Spouse name: Not on file   • Number of children: Not on file   • Years of education: Not on file   • Highest education level: Not on file   Social Needs   • Financial resource strain: Not on file   • Food insecurity - worry: Not on file   • Food insecurity - inability: Not on file   • Transportation needs - medical: Not on file   • Transportation needs - non-medical: Not on file   Occupational History   • Not on file   Tobacco Use   • Smoking status: Former Smoker     Last attempt to quit: 2015     Years since quitting: 3.9   • Smokeless tobacco: Never Used   • Tobacco comment: uses vape   Substance and Sexual Activity    • Alcohol use: No   • Drug use: No   • Sexual activity: Defer   Other Topics Concern   • Not on file   Social History Narrative   • Not on file       Review of Systems   Constitutional: Negative for appetite change, chills, fatigue, fever, unexpected weight gain and unexpected weight loss.   HENT: Positive for hearing loss. Negative for trouble swallowing and voice change.    Eyes: Negative for visual disturbance.   Respiratory: Positive for shortness of breath. Negative for cough and wheezing.    Cardiovascular: Negative for chest pain and leg swelling.   Gastrointestinal: Negative for abdominal distention, abdominal pain, nausea and vomiting.   Genitourinary: Negative.    Musculoskeletal: Negative for gait problem and myalgias.   Skin: Negative.    Neurological: Negative for weakness.   Hematological: Negative for adenopathy. Bruises/bleeds easily.   Psychiatric/Behavioral: The patient is not nervous/anxious.        Vitals:    11/30/18 1105   BP: 122/70   Pulse: 102   SpO2: 98%       Physical Exam   Constitutional: He is oriented to person, place, and time. He appears well-developed and well-nourished. No distress.   HENT:   Head: Normocephalic and atraumatic.   Right Ear: Decreased hearing (Severe) is noted.   Left Ear: Decreased hearing (Severe) is noted.   Eyes: Pupils are equal, round, and reactive to light. No scleral icterus.   Neck: Normal range of motion. Neck supple.   Cardiovascular: Normal rate, regular rhythm, S1 normal and S2 normal.   Pulmonary/Chest: Effort normal. No tachypnea. He has decreased breath sounds (Throughout). He has no wheezes. He has no rhonchi. He has no rales.   Abdominal: Soft. Bowel sounds are normal. He exhibits no distension.   Musculoskeletal: Normal range of motion. He exhibits no edema.   Lymphadenopathy:     He has no cervical adenopathy.   Neurological: He is alert and oriented to person, place, and time.   Skin: Skin is warm and dry. No rash noted. No cyanosis.    Psychiatric: He has a normal mood and affect. His behavior is normal.   Vitals reviewed.      My impression of chest CT: This was done at [facility] on 11-20 1-18 showing severe emphysema with extensive fibrosis throughout.  Within the right apex there is a cavitation or possibly a thick bulla measuring almost 5 cm.  There is also a masslike area along the upper margin of this cavity that may be further scarring but a malignancy cannot be ruled out.  There is some other scattered nodules throughout.  Very complex chest CT.  Recommend follow-up with a PET scan.    Miguel was seen today for copd.    Diagnoses and all orders for this visit:    Stage 2 moderate COPD by GOLD classification (CMS/HCC)    Scarring of lung    Centrilobular emphysema (CMS/HCC)  Comments:  Severe bullous    Nocturnal hypoxia    Former smoker    Other orders  -     ipratropium-albuterol (COMBIVENT RESPIMAT)  MCG/ACT inhaler; Inhale 1 puff 4 (Four) Times a Day As Needed for Wheezing or Shortness of Air for up to 30 days.      Patient's Body mass index is 22.32 kg/m². BMI is within normal parameters. No follow-up required.      Follow-up: I recommended that the patient consider having a PET scan.  Per the patient's wife's request I will forward the results of CT scan to Dr. Burnett for his review to see if he feels that the patient is a good candidate for bronchoscopy.  The patient's wife will call back for an appointment.    Jesus Pérez, VANNESSA  11/30/2018  2:04 PM

## 2018-11-30 NOTE — PATIENT INSTRUCTIONS
PET Scan  A PET scan, also called positron emission tomography, is a test that creates pictures of the inside of the body. A PET scan requires a small dose of a harmless radioactive material to be injected into a vein. When this material combines with certain substances in the body, it produces tiny particles that can be detected by a scanner and converted into pictures.  The pictures created during a PET scan can be used to study a disease. They are often used to study cancer and cancer therapy. The colors and brightness on the pictures show different levels of organ and tissue function. For example, cancer tissue appears brighter than normal tissue on a PET scan picture.  Tell a health care provider about:  · Any allergies you have.  · All medicines you are taking, including vitamins, herbs, eye drops, creams, and over-the-counter medicines.  · Any problems you or family members have had with anesthetic medicines.  · Any blood disorders you have.  · Any surgeries you have had.  · Any medical conditions you have.  · If you are afraid of cramped spaces (claustrophobic). If claustrophobia is a problem, it usually can be relieved with mild sedatives or antianxiety medicines.  · If you have trouble staying still for long periods of time.  What happens before the procedure?  · Do not eat or drink anything after midnight on the night before the procedure or as directed by your health care provider.  · Take medicines only as directed by your health care provider.  · If you have diabetes, ask your health care provider for diet guidelines to control sugar (glucose) levels on the day of the test.  What happens during the procedure?  · A small amount of radioactive material will be injected into a vein. The test will begin 30-60 minutes after the injection, when the material has traveled around your body.  · You will lie on a cushioned table, and the table will be moved through the center of a machine that looks like a large  donut. It will take about 30-60 minutes for the machine to produce pictures of your body. You will need to stay still during this time.  What happens after the procedure?  · You may resume your normal diet and activities.  · Drink several 8 oz glasses of water following the test to flush the radioactive material out of your body.  This information is not intended to replace advice given to you by your health care provider. Make sure you discuss any questions you have with your health care provider.  Document Released: 06/23/2004 Document Revised: 05/25/2017 Document Reviewed: 04/01/2015  Elsevier Interactive Patient Education © 2018 [employer]

## 2018-12-03 ENCOUNTER — TELEPHONE (OUTPATIENT)
Dept: PULMONOLOGY | Facility: CLINIC | Age: 78
End: 2018-12-03

## 2018-12-03 NOTE — TELEPHONE ENCOUNTER
Dr. Burnett,  This is a patient of Dr. Farfan's that I saw last week and recommended a PET scan based on his current CT .  The patient's wife (who is a patient of yours) was reluctant to have this done and instead wanted to know if he was a candidate for bronchoscopy.  The patient has a history of severe emphysema and COPD, he also previously had a lung collapse (many years ago) in the right lung.  He has a history of radical neck surgery in the 1970s for a history of thyroid cancer.  He is also followed by Dr. Martinez who manages treatment for the patient's leukemia.  Will you please review the CT of the chest from 11-6-18 and render your opinion.    PET, bronchoscopy or other referral?    Thank you.

## 2018-12-04 ENCOUNTER — TELEPHONE (OUTPATIENT)
Dept: PULMONOLOGY | Facility: CLINIC | Age: 78
End: 2018-12-04

## 2018-12-04 DIAGNOSIS — R91.8 MASS OF RIGHT LUNG: ICD-10-CM

## 2018-12-04 DIAGNOSIS — J43.9 BULLOUS EMPHYSEMA (HCC): ICD-10-CM

## 2018-12-04 DIAGNOSIS — J98.4 SCARRING OF LUNG: Primary | ICD-10-CM

## 2018-12-04 NOTE — TELEPHONE ENCOUNTER
Pt's wife called and said that he does want to go ahead and get the PET Scan.  I will need an order please.  Thanks

## 2018-12-11 ENCOUNTER — TELEPHONE (OUTPATIENT)
Dept: PULMONOLOGY | Facility: CLINIC | Age: 78
End: 2018-12-11

## 2018-12-11 DIAGNOSIS — R91.8 MASS OF LOWER LOBE OF RIGHT LUNG: Primary | ICD-10-CM

## 2018-12-11 NOTE — TELEPHONE ENCOUNTER
I tried calling the patient at home, I left a message on the answering machine asking for he or his wife to call the office.      Dr. Burnett and I have reviewed this scan together.  Since the PET scan is being denied, we would recommend that the patient be referred to the [place] [facility] clinic (Dr. Grace) to have this biopsied.    If the patient is agreeable to this, please make the referral.

## 2018-12-11 NOTE — TELEPHONE ENCOUNTER
Dr. Burnett this is (a Farfan) patient that I sent you a message on a couple of weeks ago.  His wife is a patient of yours.  I am not comfortable making the call on this patient because of the complexity of his medical problems including being under treatment for some type of leukemia currently.  I looked to see if there was any place to get this patient on your schedule but so you could see him but that appears impossible.  Please advise.

## 2018-12-11 NOTE — TELEPHONE ENCOUNTER
Millicent at [facility] called and said that due to the size of the patients nodule which is 3.8 it will need to be biopsied first before a PET scan can be done.  He is scheduled for 12/13 for the PET and she said if it can't be biopsied she will need documentation stating why it cannot be biopsied in order for insurance to pay for the PET.

## 2018-12-12 NOTE — TELEPHONE ENCOUNTER
Call pt, he is agreeable to being referred to [facility].  I also called and canceled patient's PET scheduled at  on 12/13/18, spoke to Malaika.  Can you send me a referral for which doctor you want pt referred to at [facility] please?

## 2018-12-13 ENCOUNTER — APPOINTMENT (OUTPATIENT)
Dept: CT IMAGING | Facility: HOSPITAL | Age: 78
End: 2018-12-13

## 2018-12-17 ENCOUNTER — TELEPHONE (OUTPATIENT)
Dept: PULMONOLOGY | Facility: CLINIC | Age: 78
End: 2018-12-17

## 2018-12-17 NOTE — TELEPHONE ENCOUNTER
Daja at Dr. Martinez's office called. He is wanting you to refer patient to [place] for possible bronch and biopsy. His insurance denied the PET scan.    If this is okay with you, please put in the order, thanks.    Patient doesn't want to do anything until after [place].    Dr. Martinez's # [phone]

## 2018-12-31 ENCOUNTER — TRANSCRIBE ORDERS (OUTPATIENT)
Dept: ADMINISTRATIVE | Facility: HOSPITAL | Age: 78
End: 2018-12-31

## 2018-12-31 DIAGNOSIS — D50.9 IRON DEFICIENCY ANEMIA, UNSPECIFIED IRON DEFICIENCY ANEMIA TYPE: ICD-10-CM

## 2018-12-31 DIAGNOSIS — D53.9 MACROCYTIC ANEMIA: Primary | ICD-10-CM

## 2018-12-31 DIAGNOSIS — D46.9 MYELODYSPLASTIC SYNDROME (HCC): ICD-10-CM

## 2019-01-01 ENCOUNTER — TELEPHONE (OUTPATIENT)
Dept: INTERNAL MEDICINE | Age: 79
End: 2019-01-01

## 2019-01-01 ENCOUNTER — OFFICE VISIT (OUTPATIENT)
Dept: INTERNAL MEDICINE | Age: 79
End: 2019-01-01
Payer: MEDICARE

## 2019-01-01 ENCOUNTER — HOSPITAL ENCOUNTER (OUTPATIENT)
Dept: GENERAL RADIOLOGY | Age: 79
Discharge: HOME | End: 2019-12-18
Payer: MEDICARE

## 2019-01-01 VITALS
RESPIRATION RATE: 18 BRPM | SYSTOLIC BLOOD PRESSURE: 130 MMHG | BODY MASS INDEX: 22.54 KG/M2 | DIASTOLIC BLOOD PRESSURE: 74 MMHG | WEIGHT: 161 LBS | OXYGEN SATURATION: 98 % | HEIGHT: 71 IN | HEART RATE: 92 BPM | TEMPERATURE: 97.8 F

## 2019-01-01 VITALS
HEIGHT: 71 IN | SYSTOLIC BLOOD PRESSURE: 138 MMHG | DIASTOLIC BLOOD PRESSURE: 80 MMHG | OXYGEN SATURATION: 97 % | HEART RATE: 94 BPM | WEIGHT: 160 LBS | BODY MASS INDEX: 22.4 KG/M2

## 2019-01-01 VITALS
HEART RATE: 84 BPM | DIASTOLIC BLOOD PRESSURE: 72 MMHG | SYSTOLIC BLOOD PRESSURE: 124 MMHG | OXYGEN SATURATION: 95 % | TEMPERATURE: 97.6 F | BODY MASS INDEX: 21.7 KG/M2 | HEIGHT: 71 IN | WEIGHT: 155 LBS | RESPIRATION RATE: 18 BRPM

## 2019-01-01 DIAGNOSIS — J18.9 PNEUMONIA OF RIGHT LUNG DUE TO INFECTIOUS ORGANISM, UNSPECIFIED PART OF LUNG: ICD-10-CM

## 2019-01-01 DIAGNOSIS — E89.0 HYPOTHYROIDISM, POSTOP: ICD-10-CM

## 2019-01-01 DIAGNOSIS — R79.89 ELEVATED LFTS: ICD-10-CM

## 2019-01-01 DIAGNOSIS — I25.10 CORONARY ARTERY DISEASE INVOLVING NATIVE CORONARY ARTERY OF NATIVE HEART WITHOUT ANGINA PECTORIS: ICD-10-CM

## 2019-01-01 DIAGNOSIS — C73 THYROID CANCER (HCC): ICD-10-CM

## 2019-01-01 DIAGNOSIS — E78.2 MIXED HYPERLIPIDEMIA: ICD-10-CM

## 2019-01-01 DIAGNOSIS — C95.90 LEUKEMIA NOT HAVING ACHIEVED REMISSION, UNSPECIFIED LEUKEMIA TYPE (HCC): ICD-10-CM

## 2019-01-01 DIAGNOSIS — Z00.00 HEALTHCARE MAINTENANCE: ICD-10-CM

## 2019-01-01 DIAGNOSIS — J43.9 PULMONARY EMPHYSEMA, UNSPECIFIED EMPHYSEMA TYPE (HCC): ICD-10-CM

## 2019-01-01 DIAGNOSIS — D46.9 MYELODYSPLASIA (MYELODYSPLASTIC SYNDROME) (HCC): ICD-10-CM

## 2019-01-01 DIAGNOSIS — G47.34 NOCTURNAL HYPOXIA: ICD-10-CM

## 2019-01-01 DIAGNOSIS — D61.9 ANEMIA DUE TO BONE MARROW FAILURE, UNSPECIFIED BONE MARROW FAILURE TYPE (HCC): ICD-10-CM

## 2019-01-01 DIAGNOSIS — R91.8 LUNG MASS: Primary | ICD-10-CM

## 2019-01-01 DIAGNOSIS — R91.8 LUNG MASS: ICD-10-CM

## 2019-01-01 DIAGNOSIS — G89.4 CHRONIC PAIN SYNDROME: ICD-10-CM

## 2019-01-01 DIAGNOSIS — Z91.81 RISK FOR FALLS: ICD-10-CM

## 2019-01-01 DIAGNOSIS — R73.01 IFG (IMPAIRED FASTING GLUCOSE): ICD-10-CM

## 2019-01-01 DIAGNOSIS — Z12.5 ENCOUNTER FOR SCREENING FOR MALIGNANT NEOPLASM OF PROSTATE: ICD-10-CM

## 2019-01-01 DIAGNOSIS — C95.90 LEUKEMIA NOT HAVING ACHIEVED REMISSION, UNSPECIFIED LEUKEMIA TYPE (HCC): Primary | ICD-10-CM

## 2019-01-01 DIAGNOSIS — K80.20 GALLSTONES: ICD-10-CM

## 2019-01-01 DIAGNOSIS — M54.9 BACK PAIN, UNSPECIFIED BACK LOCATION, UNSPECIFIED BACK PAIN LATERALITY, UNSPECIFIED CHRONICITY: Primary | ICD-10-CM

## 2019-01-01 DIAGNOSIS — R71.8 OTHER ABNORMALITY OF RED BLOOD CELLS: ICD-10-CM

## 2019-01-01 DIAGNOSIS — J18.9 PNEUMONIA OF RIGHT LOWER LOBE DUE TO INFECTIOUS ORGANISM: ICD-10-CM

## 2019-01-01 DIAGNOSIS — F32.A DEPRESSION, UNSPECIFIED DEPRESSION TYPE: Primary | ICD-10-CM

## 2019-01-01 DIAGNOSIS — M89.9 DISORDER OF BONE: ICD-10-CM

## 2019-01-01 PROCEDURE — G8420 CALC BMI NORM PARAMETERS: HCPCS | Performed by: NURSE PRACTITIONER

## 2019-01-01 PROCEDURE — 99495 TRANSJ CARE MGMT MOD F2F 14D: CPT | Performed by: NURSE PRACTITIONER

## 2019-01-01 PROCEDURE — G8427 DOCREV CUR MEDS BY ELIG CLIN: HCPCS | Performed by: NURSE PRACTITIONER

## 2019-01-01 PROCEDURE — G8598 ASA/ANTIPLAT THER USED: HCPCS | Performed by: NURSE PRACTITIONER

## 2019-01-01 PROCEDURE — G8482 FLU IMMUNIZE ORDER/ADMIN: HCPCS | Performed by: NURSE PRACTITIONER

## 2019-01-01 PROCEDURE — 1123F ACP DISCUSS/DSCN MKR DOCD: CPT | Performed by: NURSE PRACTITIONER

## 2019-01-01 PROCEDURE — 3023F SPIROM DOC REV: CPT | Performed by: NURSE PRACTITIONER

## 2019-01-01 PROCEDURE — 4040F PNEUMOC VAC/ADMIN/RCVD: CPT | Performed by: NURSE PRACTITIONER

## 2019-01-01 PROCEDURE — G8926 SPIRO NO PERF OR DOC: HCPCS | Performed by: NURSE PRACTITIONER

## 2019-01-01 PROCEDURE — 1036F TOBACCO NON-USER: CPT | Performed by: NURSE PRACTITIONER

## 2019-01-01 PROCEDURE — 1111F DSCHRG MED/CURRENT MED MERGE: CPT | Performed by: NURSE PRACTITIONER

## 2019-01-01 PROCEDURE — 99214 OFFICE O/P EST MOD 30 MIN: CPT | Performed by: NURSE PRACTITIONER

## 2019-01-01 PROCEDURE — 71046 X-RAY EXAM CHEST 2 VIEWS: CPT

## 2019-01-01 RX ORDER — LEVOTHYROXINE SODIUM 0.12 MG/1
125 TABLET ORAL DAILY
Qty: 90 TABLET | Refills: 1 | Status: SHIPPED
Start: 2019-01-01 | End: 2020-01-01

## 2019-01-01 RX ORDER — ACETAMINOPHEN AND CODEINE PHOSPHATE 300; 30 MG/1; MG/1
1 TABLET ORAL 2 TIMES DAILY PRN
Qty: 42 TABLET | Refills: 0 | Status: SHIPPED | OUTPATIENT
Start: 2019-01-01 | End: 2019-01-01 | Stop reason: ALTCHOICE

## 2019-01-01 RX ORDER — HYDROCODONE BITARTRATE AND ACETAMINOPHEN 5; 325 MG/1; MG/1
1 TABLET ORAL EVERY 6 HOURS PRN
Qty: 12 TABLET | Refills: 0 | Status: SHIPPED | OUTPATIENT
Start: 2019-01-01 | End: 2019-01-01

## 2019-01-01 RX ORDER — LEVOTHYROXINE SODIUM 0.12 MG/1
125 TABLET ORAL DAILY
Qty: 90 TABLET | Refills: 1 | Status: SHIPPED | OUTPATIENT
Start: 2019-01-01 | End: 2019-01-01 | Stop reason: DRUGHIGH

## 2019-01-01 RX ORDER — MEGESTROL ACETATE 40 MG/ML
200 SUSPENSION ORAL DAILY
Qty: 240 ML | Refills: 3 | Status: SHIPPED | OUTPATIENT
Start: 2019-01-01 | End: 2019-01-01 | Stop reason: ALTCHOICE

## 2019-01-01 RX ORDER — MEGESTROL ACETATE 125 MG/ML
625 SUSPENSION ORAL DAILY
Qty: 150 ML | Refills: 3 | Status: SHIPPED | OUTPATIENT
Start: 2019-01-01 | End: 2019-01-01 | Stop reason: ALTCHOICE

## 2019-01-01 RX ORDER — ESCITALOPRAM OXALATE 10 MG/1
10 TABLET ORAL DAILY
Qty: 30 TABLET | Refills: 3 | Status: SHIPPED | OUTPATIENT
Start: 2019-01-01

## 2019-01-01 RX ORDER — HYDROCODONE BITARTRATE AND ACETAMINOPHEN 5; 325 MG/1; MG/1
1 TABLET ORAL EVERY 6 HOURS PRN
Qty: 12 TABLET | Refills: 0 | Status: CANCELLED | OUTPATIENT
Start: 2019-01-01 | End: 2019-01-01

## 2019-01-01 RX ORDER — MEGESTROL ACETATE 40 MG/ML
SUSPENSION ORAL
Refills: 3 | COMMUNITY
Start: 2019-01-01

## 2019-01-01 RX ORDER — HYDROCODONE BITARTRATE AND ACETAMINOPHEN 7.5; 325 MG/1; MG/1
1 TABLET ORAL EVERY 6 HOURS PRN
Qty: 120 TABLET | Refills: 0 | Status: SHIPPED | OUTPATIENT
Start: 2019-01-01 | End: 2020-01-01 | Stop reason: SDUPTHER

## 2019-01-01 RX ORDER — SIMVASTATIN 20 MG
TABLET ORAL
Qty: 90 TABLET | Refills: 2 | Status: SHIPPED | OUTPATIENT
Start: 2019-01-01 | End: 2019-01-01 | Stop reason: SINTOL

## 2019-01-01 ASSESSMENT — ENCOUNTER SYMPTOMS
CHOKING: 0
COLOR CHANGE: 0
BLOOD IN STOOL: 0
ABDOMINAL DISTENTION: 0
COUGH: 1
VOMITING: 0
EYE DISCHARGE: 0
EYE ITCHING: 0
WHEEZING: 0
STRIDOR: 0
ABDOMINAL PAIN: 0
CONSTIPATION: 0
DIARRHEA: 0
TROUBLE SWALLOWING: 0
SHORTNESS OF BREATH: 1
SORE THROAT: 0
NAUSEA: 0

## 2019-01-04 ENCOUNTER — LAB (OUTPATIENT)
Dept: LAB | Facility: HOSPITAL | Age: 79
End: 2019-01-04

## 2019-01-04 ENCOUNTER — INFUSION (OUTPATIENT)
Dept: ONCOLOGY | Facility: HOSPITAL | Age: 79
End: 2019-01-04

## 2019-01-04 VITALS
HEART RATE: 75 BPM | OXYGEN SATURATION: 100 % | RESPIRATION RATE: 18 BRPM | BODY MASS INDEX: 22.26 KG/M2 | DIASTOLIC BLOOD PRESSURE: 69 MMHG | SYSTOLIC BLOOD PRESSURE: 145 MMHG | WEIGHT: 159 LBS | TEMPERATURE: 97.9 F | HEIGHT: 71 IN

## 2019-01-04 DIAGNOSIS — D46.9 MYELODYSPLASTIC SYNDROME (HCC): ICD-10-CM

## 2019-01-04 DIAGNOSIS — D50.9 IRON DEFICIENCY ANEMIA, UNSPECIFIED IRON DEFICIENCY ANEMIA TYPE: ICD-10-CM

## 2019-01-04 DIAGNOSIS — D46.9 MDS (MYELODYSPLASTIC SYNDROME) (HCC): Primary | ICD-10-CM

## 2019-01-04 DIAGNOSIS — D53.9 MACROCYTIC ANEMIA: ICD-10-CM

## 2019-01-04 LAB
BASOPHILS # BLD AUTO: 0.02 10*3/MM3 (ref 0–0.2)
BASOPHILS NFR BLD AUTO: 0.4 % (ref 0–2)
DEPRECATED RDW RBC AUTO: 96.4 FL (ref 40–54)
EOSINOPHIL # BLD AUTO: 0.1 10*3/MM3 (ref 0–0.7)
EOSINOPHIL NFR BLD AUTO: 2.2 % (ref 0–4)
ERYTHROCYTE [DISTWIDTH] IN BLOOD BY AUTOMATED COUNT: 23.5 % (ref 12–15)
FERRITIN SERPL-MCNC: 951 NG/ML (ref 17.9–464)
HCT VFR BLD AUTO: 25.9 % (ref 40–52)
HGB BLD-MCNC: 8.2 G/DL (ref 14–18)
IMM GRANULOCYTES # BLD AUTO: 0.03 10*3/MM3 (ref 0–0.03)
IMM GRANULOCYTES NFR BLD AUTO: 0.6 % (ref 0–5)
IRON 24H UR-MRATE: 90 MCG/DL (ref 42–180)
IRON SATN MFR SERPL: 41 % (ref 20–45)
LYMPHOCYTES # BLD AUTO: 1.73 10*3/MM3 (ref 0.72–4.86)
LYMPHOCYTES NFR BLD AUTO: 37.4 % (ref 15–45)
MCH RBC QN AUTO: 35.3 PG (ref 28–32)
MCHC RBC AUTO-ENTMCNC: 31.7 G/DL (ref 33–36)
MCV RBC AUTO: 111.6 FL (ref 82–95)
MONOCYTES # BLD AUTO: 0.4 10*3/MM3 (ref 0.19–1.3)
MONOCYTES NFR BLD AUTO: 8.6 % (ref 4–12)
NEUTROPHILS # BLD AUTO: 2.35 10*3/MM3 (ref 1.87–8.4)
NEUTROPHILS NFR BLD AUTO: 50.8 % (ref 39–78)
NRBC BLD AUTO-RTO: 0.4 /100 WBC (ref 0–0)
PLATELET # BLD AUTO: 89 10*3/MM3 (ref 130–400)
PMV BLD AUTO: ABNORMAL FL (ref 6–12)
RBC # BLD AUTO: 2.32 10*6/MM3 (ref 4.8–5.9)
TIBC SERPL-MCNC: 219 MCG/DL (ref 225–420)
WBC NRBC COR # BLD: 4.63 10*3/MM3 (ref 4.8–10.8)

## 2019-01-04 PROCEDURE — 96372 THER/PROPH/DIAG INJ SC/IM: CPT

## 2019-01-04 PROCEDURE — 85025 COMPLETE CBC W/AUTO DIFF WBC: CPT | Performed by: INTERNAL MEDICINE

## 2019-01-04 PROCEDURE — 82728 ASSAY OF FERRITIN: CPT | Performed by: INTERNAL MEDICINE

## 2019-01-04 PROCEDURE — 25010000002 EPOETIN ALFA PER 1000 UNITS: Performed by: INTERNAL MEDICINE

## 2019-01-04 PROCEDURE — 83550 IRON BINDING TEST: CPT | Performed by: INTERNAL MEDICINE

## 2019-01-04 PROCEDURE — 83540 ASSAY OF IRON: CPT | Performed by: INTERNAL MEDICINE

## 2019-01-04 PROCEDURE — 36415 COLL VENOUS BLD VENIPUNCTURE: CPT

## 2019-01-04 RX ADMIN — ERYTHROPOIETIN 40000 UNITS: 40000 INJECTION, SOLUTION INTRAVENOUS; SUBCUTANEOUS at 14:48

## 2019-01-11 ENCOUNTER — LAB (OUTPATIENT)
Dept: LAB | Facility: HOSPITAL | Age: 79
End: 2019-01-11

## 2019-01-11 ENCOUNTER — INFUSION (OUTPATIENT)
Dept: ONCOLOGY | Facility: HOSPITAL | Age: 79
End: 2019-01-11

## 2019-01-11 VITALS
WEIGHT: 163 LBS | TEMPERATURE: 97.8 F | HEART RATE: 76 BPM | RESPIRATION RATE: 20 BRPM | BODY MASS INDEX: 22.82 KG/M2 | SYSTOLIC BLOOD PRESSURE: 129 MMHG | OXYGEN SATURATION: 99 % | HEIGHT: 71 IN | DIASTOLIC BLOOD PRESSURE: 66 MMHG

## 2019-01-11 DIAGNOSIS — D46.9 MDS (MYELODYSPLASTIC SYNDROME) (HCC): Primary | ICD-10-CM

## 2019-01-11 DIAGNOSIS — D53.9 MACROCYTIC ANEMIA: ICD-10-CM

## 2019-01-11 DIAGNOSIS — D50.9 IRON DEFICIENCY ANEMIA, UNSPECIFIED IRON DEFICIENCY ANEMIA TYPE: ICD-10-CM

## 2019-01-11 DIAGNOSIS — D46.9 MYELODYSPLASTIC SYNDROME (HCC): ICD-10-CM

## 2019-01-11 LAB
BASOPHILS # BLD AUTO: 0.02 10*3/MM3 (ref 0–0.2)
BASOPHILS NFR BLD AUTO: 0.5 % (ref 0–2)
DEPRECATED RDW RBC AUTO: 97.8 FL (ref 40–54)
EOSINOPHIL # BLD AUTO: 0.1 10*3/MM3 (ref 0–0.7)
EOSINOPHIL NFR BLD AUTO: 2.3 % (ref 0–4)
ERYTHROCYTE [DISTWIDTH] IN BLOOD BY AUTOMATED COUNT: 23.6 % (ref 12–15)
HCT VFR BLD AUTO: 26.8 % (ref 40–52)
HGB BLD-MCNC: 8.4 G/DL (ref 14–18)
LYMPHOCYTES # BLD AUTO: 1.3 10*3/MM3 (ref 0.72–4.86)
LYMPHOCYTES NFR BLD AUTO: 30.1 % (ref 15–45)
MCH RBC QN AUTO: 35.1 PG (ref 28–32)
MCHC RBC AUTO-ENTMCNC: 31.3 G/DL (ref 33–36)
MCV RBC AUTO: 112.1 FL (ref 82–95)
MONOCYTES # BLD AUTO: 0.41 10*3/MM3 (ref 0.19–1.3)
MONOCYTES NFR BLD AUTO: 9.5 % (ref 4–12)
NEUTROPHILS # BLD AUTO: 2.47 10*3/MM3 (ref 1.87–8.4)
NEUTROPHILS NFR BLD AUTO: 57.1 % (ref 39–78)
PLATELET # BLD AUTO: 93 10*3/MM3 (ref 130–400)
PMV BLD AUTO: 12.8 FL (ref 6–12)
RBC # BLD AUTO: 2.39 10*6/MM3 (ref 4.8–5.9)
WBC NRBC COR # BLD: 4.32 10*3/MM3 (ref 4.8–10.8)

## 2019-01-11 PROCEDURE — 25010000002 EPOETIN ALFA PER 1000 UNITS: Performed by: INTERNAL MEDICINE

## 2019-01-11 PROCEDURE — 96372 THER/PROPH/DIAG INJ SC/IM: CPT

## 2019-01-11 PROCEDURE — 85025 COMPLETE CBC W/AUTO DIFF WBC: CPT | Performed by: INTERNAL MEDICINE

## 2019-01-11 PROCEDURE — 36415 COLL VENOUS BLD VENIPUNCTURE: CPT

## 2019-01-11 RX ADMIN — ERYTHROPOIETIN 40000 UNITS: 40000 INJECTION, SOLUTION INTRAVENOUS; SUBCUTANEOUS at 14:05

## 2019-01-18 ENCOUNTER — INFUSION (OUTPATIENT)
Dept: ONCOLOGY | Facility: HOSPITAL | Age: 79
End: 2019-01-18

## 2019-01-18 ENCOUNTER — LAB (OUTPATIENT)
Dept: LAB | Facility: HOSPITAL | Age: 79
End: 2019-01-18

## 2019-01-18 DIAGNOSIS — D46.9 MYELODYSPLASTIC SYNDROME (HCC): ICD-10-CM

## 2019-01-18 DIAGNOSIS — D53.9 MACROCYTIC ANEMIA: ICD-10-CM

## 2019-01-18 DIAGNOSIS — D46.9 MDS (MYELODYSPLASTIC SYNDROME) (HCC): Primary | ICD-10-CM

## 2019-01-18 DIAGNOSIS — D50.9 IRON DEFICIENCY ANEMIA, UNSPECIFIED IRON DEFICIENCY ANEMIA TYPE: ICD-10-CM

## 2019-01-18 LAB
DEPRECATED RDW RBC AUTO: 95.6 FL (ref 40–54)
ERYTHROCYTE [DISTWIDTH] IN BLOOD BY AUTOMATED COUNT: 23.1 % (ref 12–15)
HCT VFR BLD AUTO: 25.7 % (ref 40–52)
HGB BLD-MCNC: 8.2 G/DL (ref 14–18)
MCH RBC QN AUTO: 36.1 PG (ref 28–32)
MCHC RBC AUTO-ENTMCNC: 31.9 G/DL (ref 33–36)
MCV RBC AUTO: 113.2 FL (ref 82–95)
PLATELET # BLD AUTO: 96 10*3/MM3 (ref 130–400)
RBC # BLD AUTO: 2.27 10*6/MM3 (ref 4.8–5.9)
WBC NRBC COR # BLD: 4.51 10*3/MM3 (ref 4.8–10.8)

## 2019-01-18 PROCEDURE — 85025 COMPLETE CBC W/AUTO DIFF WBC: CPT | Performed by: INTERNAL MEDICINE

## 2019-01-18 PROCEDURE — 25010000002 EPOETIN ALFA PER 1000 UNITS: Performed by: INTERNAL MEDICINE

## 2019-01-18 PROCEDURE — 96372 THER/PROPH/DIAG INJ SC/IM: CPT

## 2019-01-18 PROCEDURE — 36415 COLL VENOUS BLD VENIPUNCTURE: CPT

## 2019-01-18 RX ADMIN — ERYTHROPOIETIN 40000 UNITS: 40000 INJECTION, SOLUTION INTRAVENOUS; SUBCUTANEOUS at 13:44

## 2019-01-25 ENCOUNTER — LAB (OUTPATIENT)
Dept: LAB | Facility: HOSPITAL | Age: 79
End: 2019-01-25

## 2019-01-25 ENCOUNTER — INFUSION (OUTPATIENT)
Dept: ONCOLOGY | Facility: HOSPITAL | Age: 79
End: 2019-01-25

## 2019-01-25 VITALS
DIASTOLIC BLOOD PRESSURE: 62 MMHG | WEIGHT: 162 LBS | TEMPERATURE: 97.7 F | OXYGEN SATURATION: 100 % | BODY MASS INDEX: 22.68 KG/M2 | RESPIRATION RATE: 20 BRPM | SYSTOLIC BLOOD PRESSURE: 158 MMHG | HEIGHT: 71 IN | HEART RATE: 73 BPM

## 2019-01-25 DIAGNOSIS — D46.9 MDS (MYELODYSPLASTIC SYNDROME) (HCC): Primary | ICD-10-CM

## 2019-01-25 DIAGNOSIS — D53.9 MACROCYTIC ANEMIA: ICD-10-CM

## 2019-01-25 DIAGNOSIS — D50.9 IRON DEFICIENCY ANEMIA, UNSPECIFIED IRON DEFICIENCY ANEMIA TYPE: ICD-10-CM

## 2019-01-25 DIAGNOSIS — D46.9 MYELODYSPLASTIC SYNDROME (HCC): ICD-10-CM

## 2019-01-25 LAB
ANISOCYTOSIS BLD QL: ABNORMAL
C3 FRG RBC-MCNC: ABNORMAL
DACRYOCYTES BLD QL SMEAR: ABNORMAL
DEPRECATED RDW RBC AUTO: 94 FL (ref 40–54)
ELLIPTOCYTES BLD QL SMEAR: ABNORMAL
EOSINOPHIL # BLD MANUAL: 0.16 10*3/MM3 (ref 0–0.7)
EOSINOPHIL NFR BLD MANUAL: 3 % (ref 0–4)
ERYTHROCYTE [DISTWIDTH] IN BLOOD BY AUTOMATED COUNT: 22.7 % (ref 12–15)
GIANT PLATELETS: ABNORMAL
HCT VFR BLD AUTO: 27 % (ref 40–52)
HGB BLD-MCNC: 8.5 G/DL (ref 14–18)
HYPOCHROMIA BLD QL: ABNORMAL
LYMPHOCYTES # BLD MANUAL: 0.82 10*3/MM3 (ref 0.72–4.86)
LYMPHOCYTES NFR BLD MANUAL: 15 % (ref 15–45)
LYMPHOCYTES NFR BLD MANUAL: 5 % (ref 4–12)
MACROCYTES BLD QL SMEAR: ABNORMAL
MCH RBC QN AUTO: 35 PG (ref 28–32)
MCHC RBC AUTO-ENTMCNC: 31.5 G/DL (ref 33–36)
MCV RBC AUTO: 111.1 FL (ref 82–95)
MONOCYTES # BLD AUTO: 0.27 10*3/MM3 (ref 0.19–1.3)
NEUTROPHILS # BLD AUTO: 3.77 10*3/MM3 (ref 1.87–8.4)
NEUTROPHILS NFR BLD MANUAL: 69 % (ref 39–78)
NRBC SPEC MANUAL: 1 /100 WBC (ref 0–0)
PLATELET # BLD AUTO: 93 10*3/MM3 (ref 130–400)
PMV BLD AUTO: 13.9 FL (ref 6–12)
POIKILOCYTOSIS BLD QL SMEAR: ABNORMAL
POLYCHROMASIA BLD QL SMEAR: ABNORMAL
RBC # BLD AUTO: 2.43 10*6/MM3 (ref 4.8–5.9)
SMALL PLATELETS BLD QL SMEAR: ABNORMAL
STOMATOCYTES BLD QL SMEAR: ABNORMAL
VARIANT LYMPHS NFR BLD MANUAL: 8 % (ref 0–5)
WBC MORPH BLD: NORMAL
WBC NRBC COR # BLD: 5.47 10*3/MM3 (ref 4.8–10.8)

## 2019-01-25 PROCEDURE — 25010000002 EPOETIN ALFA PER 1000 UNITS: Performed by: INTERNAL MEDICINE

## 2019-01-25 PROCEDURE — 36415 COLL VENOUS BLD VENIPUNCTURE: CPT

## 2019-01-25 PROCEDURE — 96372 THER/PROPH/DIAG INJ SC/IM: CPT

## 2019-01-25 PROCEDURE — 85025 COMPLETE CBC W/AUTO DIFF WBC: CPT | Performed by: INTERNAL MEDICINE

## 2019-01-25 PROCEDURE — 85007 BL SMEAR W/DIFF WBC COUNT: CPT | Performed by: INTERNAL MEDICINE

## 2019-01-25 RX ADMIN — ERYTHROPOIETIN 40000 UNITS: 40000 INJECTION, SOLUTION INTRAVENOUS; SUBCUTANEOUS at 14:10

## 2019-02-01 ENCOUNTER — INFUSION (OUTPATIENT)
Dept: ONCOLOGY | Facility: HOSPITAL | Age: 79
End: 2019-02-01

## 2019-02-01 ENCOUNTER — LAB (OUTPATIENT)
Dept: LAB | Facility: HOSPITAL | Age: 79
End: 2019-02-01

## 2019-02-01 VITALS
RESPIRATION RATE: 20 BRPM | HEIGHT: 71 IN | OXYGEN SATURATION: 99 % | SYSTOLIC BLOOD PRESSURE: 120 MMHG | HEART RATE: 73 BPM | WEIGHT: 163 LBS | BODY MASS INDEX: 22.82 KG/M2 | TEMPERATURE: 98.3 F | DIASTOLIC BLOOD PRESSURE: 52 MMHG

## 2019-02-01 DIAGNOSIS — D46.9 MDS (MYELODYSPLASTIC SYNDROME) (HCC): Primary | ICD-10-CM

## 2019-02-01 DIAGNOSIS — D53.9 MACROCYTIC ANEMIA: ICD-10-CM

## 2019-02-01 DIAGNOSIS — D50.9 IRON DEFICIENCY ANEMIA, UNSPECIFIED IRON DEFICIENCY ANEMIA TYPE: ICD-10-CM

## 2019-02-01 DIAGNOSIS — D46.9 MYELODYSPLASTIC SYNDROME (HCC): ICD-10-CM

## 2019-02-01 LAB
BASOPHILS # BLD AUTO: 0.03 10*3/MM3 (ref 0–0.2)
BASOPHILS NFR BLD AUTO: 0.6 % (ref 0–2)
DEPRECATED RDW RBC AUTO: 96.9 FL (ref 40–54)
EOSINOPHIL # BLD AUTO: 0.15 10*3/MM3 (ref 0–0.7)
EOSINOPHIL NFR BLD AUTO: 3.1 % (ref 0–4)
ERYTHROCYTE [DISTWIDTH] IN BLOOD BY AUTOMATED COUNT: 23.5 % (ref 12–15)
FERRITIN SERPL-MCNC: 594 NG/ML (ref 17.9–464)
HCT VFR BLD AUTO: 25.6 % (ref 40–52)
HGB BLD-MCNC: 8.1 G/DL (ref 14–18)
IMM GRANULOCYTES # BLD AUTO: 0.03 10*3/MM3 (ref 0–0.03)
IMM GRANULOCYTES NFR BLD AUTO: 0.6 % (ref 0–5)
IRON 24H UR-MRATE: 135 MCG/DL (ref 42–180)
IRON SATN MFR SERPL: 60 % (ref 20–45)
LYMPHOCYTES # BLD AUTO: 1.49 10*3/MM3 (ref 0.72–4.86)
LYMPHOCYTES NFR BLD AUTO: 30.9 % (ref 15–45)
MCH RBC QN AUTO: 35.7 PG (ref 28–32)
MCHC RBC AUTO-ENTMCNC: 31.6 G/DL (ref 33–36)
MCV RBC AUTO: 112.8 FL (ref 82–95)
MONOCYTES # BLD AUTO: 0.47 10*3/MM3 (ref 0.19–1.3)
MONOCYTES NFR BLD AUTO: 9.8 % (ref 4–12)
NEUTROPHILS # BLD AUTO: 2.65 10*3/MM3 (ref 1.87–8.4)
NEUTROPHILS NFR BLD AUTO: 55 % (ref 39–78)
NRBC BLD AUTO-RTO: 2.1 /100 WBC (ref 0–0)
PLATELET # BLD AUTO: 127 10*3/MM3 (ref 130–400)
PMV BLD AUTO: 12.7 FL (ref 6–12)
RBC # BLD AUTO: 2.27 10*6/MM3 (ref 4.8–5.9)
TIBC SERPL-MCNC: 225 MCG/DL (ref 225–420)
WBC NRBC COR # BLD: 4.82 10*3/MM3 (ref 4.8–10.8)

## 2019-02-01 PROCEDURE — 36415 COLL VENOUS BLD VENIPUNCTURE: CPT

## 2019-02-01 PROCEDURE — 83550 IRON BINDING TEST: CPT | Performed by: INTERNAL MEDICINE

## 2019-02-01 PROCEDURE — 25010000002 EPOETIN ALFA PER 1000 UNITS: Performed by: INTERNAL MEDICINE

## 2019-02-01 PROCEDURE — 96372 THER/PROPH/DIAG INJ SC/IM: CPT

## 2019-02-01 PROCEDURE — 83540 ASSAY OF IRON: CPT | Performed by: INTERNAL MEDICINE

## 2019-02-01 PROCEDURE — 82728 ASSAY OF FERRITIN: CPT | Performed by: INTERNAL MEDICINE

## 2019-02-01 PROCEDURE — 85025 COMPLETE CBC W/AUTO DIFF WBC: CPT | Performed by: INTERNAL MEDICINE

## 2019-02-01 RX ADMIN — ERYTHROPOIETIN 40000 UNITS: 40000 INJECTION, SOLUTION INTRAVENOUS; SUBCUTANEOUS at 14:12

## 2019-02-08 ENCOUNTER — LAB (OUTPATIENT)
Dept: LAB | Facility: HOSPITAL | Age: 79
End: 2019-02-08

## 2019-02-08 ENCOUNTER — INFUSION (OUTPATIENT)
Dept: ONCOLOGY | Facility: HOSPITAL | Age: 79
End: 2019-02-08

## 2019-02-08 VITALS
DIASTOLIC BLOOD PRESSURE: 74 MMHG | SYSTOLIC BLOOD PRESSURE: 130 MMHG | BODY MASS INDEX: 22.96 KG/M2 | RESPIRATION RATE: 20 BRPM | HEIGHT: 71 IN | TEMPERATURE: 98.3 F | HEART RATE: 77 BPM | WEIGHT: 164 LBS | OXYGEN SATURATION: 99 %

## 2019-02-08 DIAGNOSIS — D46.9 MYELODYSPLASTIC SYNDROME (HCC): ICD-10-CM

## 2019-02-08 DIAGNOSIS — D46.9 MDS (MYELODYSPLASTIC SYNDROME) (HCC): Primary | ICD-10-CM

## 2019-02-08 DIAGNOSIS — D53.9 MACROCYTIC ANEMIA: ICD-10-CM

## 2019-02-08 DIAGNOSIS — D50.9 IRON DEFICIENCY ANEMIA, UNSPECIFIED IRON DEFICIENCY ANEMIA TYPE: ICD-10-CM

## 2019-02-08 LAB
ANISOCYTOSIS BLD QL: ABNORMAL
DEPRECATED RDW RBC AUTO: 98.2 FL (ref 40–54)
ERYTHROCYTE [DISTWIDTH] IN BLOOD BY AUTOMATED COUNT: 24.5 % (ref 12–15)
FERRITIN SERPL-MCNC: 815 NG/ML (ref 17.9–464)
GIANT PLATELETS: ABNORMAL
HCT VFR BLD AUTO: 25.7 % (ref 40–52)
HGB BLD-MCNC: 8 G/DL (ref 14–18)
HYPOCHROMIA BLD QL: ABNORMAL
IRON 24H UR-MRATE: 166 MCG/DL (ref 42–180)
IRON SATN MFR SERPL: 75 % (ref 20–45)
LYMPHOCYTES # BLD MANUAL: 3.69 10*3/MM3 (ref 0.72–4.86)
LYMPHOCYTES NFR BLD MANUAL: 44.4 % (ref 15–45)
LYMPHOCYTES NFR BLD MANUAL: 7.1 % (ref 4–12)
MCH RBC QN AUTO: 34.6 PG (ref 28–32)
MCHC RBC AUTO-ENTMCNC: 31.1 G/DL (ref 33–36)
MCV RBC AUTO: 111.3 FL (ref 82–95)
MONOCYTES # BLD AUTO: 0.59 10*3/MM3 (ref 0.19–1.3)
NEUTROPHILS # BLD AUTO: 3.79 10*3/MM3 (ref 1.87–8.4)
NEUTROPHILS NFR BLD MANUAL: 45.5 % (ref 39–78)
NRBC BLD AUTO-RTO: 1.2 /100 WBC (ref 0–0)
NRBC SPEC MANUAL: 3 /100 WBC (ref 0–0)
PLATELET # BLD AUTO: 125 10*3/MM3 (ref 130–400)
PMV BLD AUTO: 12.8 FL (ref 6–12)
POIKILOCYTOSIS BLD QL SMEAR: ABNORMAL
RBC # BLD AUTO: 2.31 10*6/MM3 (ref 4.8–5.9)
TIBC SERPL-MCNC: 222 MCG/DL (ref 225–420)
VARIANT LYMPHS NFR BLD MANUAL: 3 % (ref 0–5)
WBC MORPH BLD: NORMAL
WBC NRBC COR # BLD: 8.32 10*3/MM3 (ref 4.8–10.8)

## 2019-02-08 PROCEDURE — 25010000002 EPOETIN ALFA PER 1000 UNITS: Performed by: INTERNAL MEDICINE

## 2019-02-08 PROCEDURE — 36415 COLL VENOUS BLD VENIPUNCTURE: CPT

## 2019-02-08 PROCEDURE — 82728 ASSAY OF FERRITIN: CPT | Performed by: INTERNAL MEDICINE

## 2019-02-08 PROCEDURE — 85007 BL SMEAR W/DIFF WBC COUNT: CPT | Performed by: INTERNAL MEDICINE

## 2019-02-08 PROCEDURE — 83550 IRON BINDING TEST: CPT | Performed by: INTERNAL MEDICINE

## 2019-02-08 PROCEDURE — 85025 COMPLETE CBC W/AUTO DIFF WBC: CPT | Performed by: INTERNAL MEDICINE

## 2019-02-08 PROCEDURE — 96372 THER/PROPH/DIAG INJ SC/IM: CPT

## 2019-02-08 PROCEDURE — 83540 ASSAY OF IRON: CPT | Performed by: INTERNAL MEDICINE

## 2019-02-08 RX ADMIN — ERYTHROPOIETIN 40000 UNITS: 40000 INJECTION, SOLUTION INTRAVENOUS; SUBCUTANEOUS at 14:13

## 2019-02-15 ENCOUNTER — LAB (OUTPATIENT)
Dept: LAB | Facility: HOSPITAL | Age: 79
End: 2019-02-15

## 2019-02-15 ENCOUNTER — INFUSION (OUTPATIENT)
Dept: ONCOLOGY | Facility: HOSPITAL | Age: 79
End: 2019-02-15

## 2019-02-15 VITALS
BODY MASS INDEX: 22.82 KG/M2 | SYSTOLIC BLOOD PRESSURE: 134 MMHG | HEART RATE: 74 BPM | HEIGHT: 71 IN | WEIGHT: 163 LBS | TEMPERATURE: 98.4 F | DIASTOLIC BLOOD PRESSURE: 55 MMHG

## 2019-02-15 DIAGNOSIS — D46.9 MYELODYSPLASTIC SYNDROME (HCC): ICD-10-CM

## 2019-02-15 DIAGNOSIS — D50.9 IRON DEFICIENCY ANEMIA, UNSPECIFIED IRON DEFICIENCY ANEMIA TYPE: ICD-10-CM

## 2019-02-15 DIAGNOSIS — D46.9 MDS (MYELODYSPLASTIC SYNDROME) (HCC): Primary | ICD-10-CM

## 2019-02-15 DIAGNOSIS — D53.9 MACROCYTIC ANEMIA: ICD-10-CM

## 2019-02-15 LAB
BASOPHILS # BLD AUTO: 0.02 10*3/MM3 (ref 0–0.2)
BASOPHILS NFR BLD AUTO: 0.4 % (ref 0–2)
DEPRECATED RDW RBC AUTO: 105.6 FL (ref 40–54)
EOSINOPHIL # BLD AUTO: 0.11 10*3/MM3 (ref 0–0.7)
EOSINOPHIL NFR BLD AUTO: 2.2 % (ref 0–4)
ERYTHROCYTE [DISTWIDTH] IN BLOOD BY AUTOMATED COUNT: 25.6 % (ref 12–15)
HCT VFR BLD AUTO: 25.2 % (ref 40–52)
HGB BLD-MCNC: 7.8 G/DL (ref 14–18)
LYMPHOCYTES # BLD AUTO: 1.43 10*3/MM3 (ref 0.72–4.86)
LYMPHOCYTES NFR BLD AUTO: 29.1 % (ref 15–45)
MCH RBC QN AUTO: 35 PG (ref 28–32)
MCHC RBC AUTO-ENTMCNC: 31 G/DL (ref 33–36)
MCV RBC AUTO: 113 FL (ref 82–95)
MONOCYTES # BLD AUTO: 0.51 10*3/MM3 (ref 0.19–1.3)
MONOCYTES NFR BLD AUTO: 10.4 % (ref 4–12)
NEUTROPHILS # BLD AUTO: 2.82 10*3/MM3 (ref 1.87–8.4)
NEUTROPHILS NFR BLD AUTO: 57.5 % (ref 39–78)
PLATELET # BLD AUTO: 114 10*3/MM3 (ref 130–400)
PMV BLD AUTO: 13.8 FL (ref 6–12)
RBC # BLD AUTO: 2.23 10*6/MM3 (ref 4.8–5.9)
WBC NRBC COR # BLD: 4.91 10*3/MM3 (ref 4.8–10.8)

## 2019-02-15 PROCEDURE — 36415 COLL VENOUS BLD VENIPUNCTURE: CPT

## 2019-02-15 PROCEDURE — 25010000002 EPOETIN ALFA PER 1000 UNITS: Performed by: INTERNAL MEDICINE

## 2019-02-15 PROCEDURE — 96372 THER/PROPH/DIAG INJ SC/IM: CPT

## 2019-02-15 PROCEDURE — 85025 COMPLETE CBC W/AUTO DIFF WBC: CPT | Performed by: INTERNAL MEDICINE

## 2019-02-15 RX ADMIN — ERYTHROPOIETIN 40000 UNITS: 40000 INJECTION, SOLUTION INTRAVENOUS; SUBCUTANEOUS at 14:11

## 2019-02-15 NOTE — PROGRESS NOTES
1410 Called Grzegorz Martinez's office s/w Jaclyn Matias RN regarding CBC results Hgb 7.8/ Hct 25.2 patient not symptomatic. Give Procrit per order no blood transfusion unless patient symptomatic or at 7.0 per Dr. Martinez. Patient aware, verbalized understanding.Claudio ZHANG

## 2019-02-22 ENCOUNTER — LAB (OUTPATIENT)
Dept: LAB | Facility: HOSPITAL | Age: 79
End: 2019-02-22

## 2019-02-22 ENCOUNTER — INFUSION (OUTPATIENT)
Dept: ONCOLOGY | Facility: HOSPITAL | Age: 79
End: 2019-02-22

## 2019-02-22 ENCOUNTER — TRANSCRIBE ORDERS (OUTPATIENT)
Dept: ADMINISTRATIVE | Facility: HOSPITAL | Age: 79
End: 2019-02-22

## 2019-02-22 VITALS
HEART RATE: 79 BPM | BODY MASS INDEX: 22.82 KG/M2 | HEIGHT: 71 IN | SYSTOLIC BLOOD PRESSURE: 145 MMHG | OXYGEN SATURATION: 99 % | TEMPERATURE: 98.5 F | WEIGHT: 163 LBS | DIASTOLIC BLOOD PRESSURE: 59 MMHG | RESPIRATION RATE: 20 BRPM

## 2019-02-22 DIAGNOSIS — D64.9 ANEMIA, UNSPECIFIED TYPE: Primary | ICD-10-CM

## 2019-02-22 DIAGNOSIS — D46.9 MYELODYSPLASTIC SYNDROME (HCC): ICD-10-CM

## 2019-02-22 DIAGNOSIS — D50.9 IRON DEFICIENCY ANEMIA, UNSPECIFIED IRON DEFICIENCY ANEMIA TYPE: ICD-10-CM

## 2019-02-22 DIAGNOSIS — D64.9 ANEMIA, UNSPECIFIED TYPE: ICD-10-CM

## 2019-02-22 DIAGNOSIS — D46.9 MDS (MYELODYSPLASTIC SYNDROME) (HCC): Primary | ICD-10-CM

## 2019-02-22 LAB
ALBUMIN SERPL-MCNC: 4.4 G/DL (ref 3.5–5)
ALBUMIN/GLOB SERPL: 1.5 G/DL (ref 1.1–2.5)
ALP SERPL-CCNC: 91 U/L (ref 24–120)
ALT SERPL W P-5'-P-CCNC: <15 U/L (ref 0–54)
ANION GAP SERPL CALCULATED.3IONS-SCNC: 7 MMOL/L (ref 4–13)
ANISOCYTOSIS BLD QL: ABNORMAL
AST SERPL-CCNC: 32 U/L (ref 7–45)
BILIRUB SERPL-MCNC: 0.7 MG/DL (ref 0.1–1)
BUN BLD-MCNC: 15 MG/DL (ref 5–21)
BUN/CREAT SERPL: 14.3 (ref 7–25)
CALCIUM SPEC-SCNC: 9 MG/DL (ref 8.4–10.4)
CHLORIDE SERPL-SCNC: 103 MMOL/L (ref 98–110)
CO2 SERPL-SCNC: 29 MMOL/L (ref 24–31)
CREAT BLD-MCNC: 1.05 MG/DL (ref 0.5–1.4)
DEPRECATED RDW RBC AUTO: 108 FL (ref 40–54)
ELLIPTOCYTES BLD QL SMEAR: ABNORMAL
EOSINOPHIL # BLD MANUAL: 0.36 10*3/MM3 (ref 0–0.7)
EOSINOPHIL NFR BLD MANUAL: 6 % (ref 0–4)
ERYTHROCYTE [DISTWIDTH] IN BLOOD BY AUTOMATED COUNT: 26 % (ref 12–15)
FERRITIN SERPL-MCNC: 696 NG/ML (ref 17.9–464)
GFR SERPL CREATININE-BSD FRML MDRD: 68 ML/MIN/1.73
GIANT PLATELETS: ABNORMAL
GLOBULIN UR ELPH-MCNC: 3 GM/DL
GLUCOSE BLD-MCNC: 98 MG/DL (ref 70–100)
HCT VFR BLD AUTO: 27.1 % (ref 40–52)
HGB BLD-MCNC: 8.4 G/DL (ref 14–18)
HYPOCHROMIA BLD QL: ABNORMAL
IRON 24H UR-MRATE: 70 MCG/DL (ref 42–180)
IRON 24H UR-MRATE: 70 MCG/DL (ref 42–180)
IRON SATN MFR SERPL: 28 % (ref 20–45)
LYMPHOCYTES # BLD MANUAL: 1.39 10*3/MM3 (ref 0.72–4.86)
LYMPHOCYTES NFR BLD MANUAL: 23 % (ref 15–45)
LYMPHOCYTES NFR BLD MANUAL: 6 % (ref 4–12)
MACROCYTES BLD QL SMEAR: ABNORMAL
MCH RBC QN AUTO: 35.6 PG (ref 28–32)
MCHC RBC AUTO-ENTMCNC: 31 G/DL (ref 33–36)
MCV RBC AUTO: 114.8 FL (ref 82–95)
MICROCYTES BLD QL: ABNORMAL
MONOCYTES # BLD AUTO: 0.36 10*3/MM3 (ref 0.19–1.3)
NEUTROPHILS # BLD AUTO: 3.75 10*3/MM3 (ref 1.87–8.4)
NEUTROPHILS NFR BLD MANUAL: 60 % (ref 39–78)
NEUTS BAND NFR BLD MANUAL: 2 % (ref 0–10)
PLATELET # BLD AUTO: 112 10*3/MM3 (ref 130–400)
POIKILOCYTOSIS BLD QL SMEAR: ABNORMAL
POLYCHROMASIA BLD QL SMEAR: ABNORMAL
POTASSIUM BLD-SCNC: 3.9 MMOL/L (ref 3.5–5.3)
PROT SERPL-MCNC: 7.4 G/DL (ref 6.3–8.7)
RBC # BLD AUTO: 2.36 10*6/MM3 (ref 4.8–5.9)
SMALL PLATELETS BLD QL SMEAR: ABNORMAL
SODIUM BLD-SCNC: 139 MMOL/L (ref 135–145)
TARGETS BLD QL SMEAR: ABNORMAL
TIBC SERPL-MCNC: 246 MCG/DL (ref 225–420)
VARIANT LYMPHS NFR BLD MANUAL: 3 % (ref 0–5)
WBC MORPH BLD: NORMAL
WBC NRBC COR # BLD: 6.05 10*3/MM3 (ref 4.8–10.8)

## 2019-02-22 PROCEDURE — 83550 IRON BINDING TEST: CPT | Performed by: INTERNAL MEDICINE

## 2019-02-22 PROCEDURE — 80053 COMPREHEN METABOLIC PANEL: CPT | Performed by: INTERNAL MEDICINE

## 2019-02-22 PROCEDURE — 83540 ASSAY OF IRON: CPT | Performed by: INTERNAL MEDICINE

## 2019-02-22 PROCEDURE — 85025 COMPLETE CBC W/AUTO DIFF WBC: CPT | Performed by: INTERNAL MEDICINE

## 2019-02-22 PROCEDURE — 82728 ASSAY OF FERRITIN: CPT | Performed by: INTERNAL MEDICINE

## 2019-02-22 PROCEDURE — 25010000002 EPOETIN ALFA PER 1000 UNITS: Performed by: INTERNAL MEDICINE

## 2019-02-22 PROCEDURE — 36415 COLL VENOUS BLD VENIPUNCTURE: CPT

## 2019-02-22 PROCEDURE — 96372 THER/PROPH/DIAG INJ SC/IM: CPT

## 2019-02-22 PROCEDURE — 85007 BL SMEAR W/DIFF WBC COUNT: CPT | Performed by: INTERNAL MEDICINE

## 2019-02-22 RX ADMIN — ERYTHROPOIETIN 40000 UNITS: 40000 INJECTION, SOLUTION INTRAVENOUS; SUBCUTANEOUS at 16:17

## 2019-03-01 ENCOUNTER — INFUSION (OUTPATIENT)
Dept: ONCOLOGY | Facility: HOSPITAL | Age: 79
End: 2019-03-01

## 2019-03-01 ENCOUNTER — LAB (OUTPATIENT)
Dept: LAB | Facility: HOSPITAL | Age: 79
End: 2019-03-01

## 2019-03-01 VITALS
WEIGHT: 162 LBS | HEIGHT: 71 IN | BODY MASS INDEX: 22.68 KG/M2 | DIASTOLIC BLOOD PRESSURE: 79 MMHG | TEMPERATURE: 98 F | HEART RATE: 98 BPM | RESPIRATION RATE: 16 BRPM | SYSTOLIC BLOOD PRESSURE: 147 MMHG | OXYGEN SATURATION: 98 %

## 2019-03-01 DIAGNOSIS — D53.9 MACROCYTIC ANEMIA: ICD-10-CM

## 2019-03-01 DIAGNOSIS — D46.9 MDS (MYELODYSPLASTIC SYNDROME) (HCC): Primary | ICD-10-CM

## 2019-03-01 DIAGNOSIS — D50.9 IRON DEFICIENCY ANEMIA, UNSPECIFIED IRON DEFICIENCY ANEMIA TYPE: ICD-10-CM

## 2019-03-01 DIAGNOSIS — D46.9 MYELODYSPLASTIC SYNDROME (HCC): ICD-10-CM

## 2019-03-01 LAB
BASOPHILS # BLD AUTO: 0.03 10*3/MM3 (ref 0–0.2)
BASOPHILS NFR BLD AUTO: 0.5 % (ref 0–2)
DEPRECATED RDW RBC AUTO: 105 FL (ref 40–54)
EOSINOPHIL # BLD AUTO: 0.14 10*3/MM3 (ref 0–0.7)
EOSINOPHIL NFR BLD AUTO: 2.2 % (ref 0–4)
ERYTHROCYTE [DISTWIDTH] IN BLOOD BY AUTOMATED COUNT: 26 % (ref 12–15)
HCT VFR BLD AUTO: 25.8 % (ref 40–52)
HGB BLD-MCNC: 8.2 G/DL (ref 14–18)
LYMPHOCYTES # BLD AUTO: 1.4 10*3/MM3 (ref 0.72–4.86)
LYMPHOCYTES NFR BLD AUTO: 22.2 % (ref 15–45)
MCH RBC QN AUTO: 35.7 PG (ref 28–32)
MCHC RBC AUTO-ENTMCNC: 31.8 G/DL (ref 33–36)
MCV RBC AUTO: 112.2 FL (ref 82–95)
MONOCYTES # BLD AUTO: 0.49 10*3/MM3 (ref 0.19–1.3)
MONOCYTES NFR BLD AUTO: 7.8 % (ref 4–12)
NEUTROPHILS # BLD AUTO: 4.21 10*3/MM3 (ref 1.87–8.4)
NEUTROPHILS NFR BLD AUTO: 66.8 % (ref 39–78)
PLATELET # BLD AUTO: 90 10*3/MM3 (ref 130–400)
RBC # BLD AUTO: 2.3 10*6/MM3 (ref 4.8–5.9)
WBC NRBC COR # BLD: 6.3 10*3/MM3 (ref 4.8–10.8)

## 2019-03-01 PROCEDURE — 96372 THER/PROPH/DIAG INJ SC/IM: CPT

## 2019-03-01 PROCEDURE — 85025 COMPLETE CBC W/AUTO DIFF WBC: CPT | Performed by: INTERNAL MEDICINE

## 2019-03-01 PROCEDURE — 36415 COLL VENOUS BLD VENIPUNCTURE: CPT

## 2019-03-01 PROCEDURE — 25010000002 EPOETIN ALFA PER 1000 UNITS: Performed by: INTERNAL MEDICINE

## 2019-03-01 RX ADMIN — ERYTHROPOIETIN 40000 UNITS: 40000 INJECTION, SOLUTION INTRAVENOUS; SUBCUTANEOUS at 13:48

## 2019-03-05 ENCOUNTER — OFFICE VISIT (OUTPATIENT)
Dept: INTERNAL MEDICINE | Age: 79
End: 2019-03-05
Payer: MEDICARE

## 2019-03-05 VITALS
RESPIRATION RATE: 16 BRPM | BODY MASS INDEX: 22.82 KG/M2 | DIASTOLIC BLOOD PRESSURE: 68 MMHG | OXYGEN SATURATION: 95 % | WEIGHT: 163 LBS | HEART RATE: 78 BPM | HEIGHT: 71 IN | SYSTOLIC BLOOD PRESSURE: 136 MMHG

## 2019-03-05 DIAGNOSIS — E89.0 HYPOTHYROIDISM, POSTOP: ICD-10-CM

## 2019-03-05 DIAGNOSIS — D46.9 MYELODYSPLASIA (MYELODYSPLASTIC SYNDROME) (HCC): ICD-10-CM

## 2019-03-05 DIAGNOSIS — H54.7 VISUAL LOSS: ICD-10-CM

## 2019-03-05 DIAGNOSIS — Z00.00 ROUTINE GENERAL MEDICAL EXAMINATION AT A HEALTH CARE FACILITY: ICD-10-CM

## 2019-03-05 DIAGNOSIS — J43.9 PULMONARY EMPHYSEMA, UNSPECIFIED EMPHYSEMA TYPE (HCC): Primary | ICD-10-CM

## 2019-03-05 DIAGNOSIS — M89.9 DISORDER OF BONE: ICD-10-CM

## 2019-03-05 DIAGNOSIS — Z00.00 HEALTH CARE MAINTENANCE: ICD-10-CM

## 2019-03-05 DIAGNOSIS — Z12.5 ENCOUNTER FOR SCREENING FOR MALIGNANT NEOPLASM OF PROSTATE: ICD-10-CM

## 2019-03-05 DIAGNOSIS — C73 THYROID CANCER (HCC): ICD-10-CM

## 2019-03-05 LAB
ALBUMIN SERPL-MCNC: 4.2 G/DL (ref 3.5–5.2)
ALP BLD-CCNC: 106 U/L (ref 40–130)
ALT SERPL-CCNC: 9 U/L (ref 5–41)
ANION GAP SERPL CALCULATED.3IONS-SCNC: 13 MMOL/L (ref 7–19)
AST SERPL-CCNC: 17 U/L (ref 5–40)
BILIRUB SERPL-MCNC: 0.6 MG/DL (ref 0.2–1.2)
BUN BLDV-MCNC: 19 MG/DL (ref 8–23)
CALCIUM SERPL-MCNC: 8.9 MG/DL (ref 8.8–10.2)
CHLORIDE BLD-SCNC: 103 MMOL/L (ref 98–111)
CHOLESTEROL, TOTAL: 144 MG/DL (ref 160–199)
CO2: 25 MMOL/L (ref 22–29)
CREAT SERPL-MCNC: 1.1 MG/DL (ref 0.5–1.2)
GFR NON-AFRICAN AMERICAN: >60
GLUCOSE BLD-MCNC: 98 MG/DL (ref 74–109)
HCT VFR BLD CALC: 29.4 % (ref 42–52)
HDLC SERPL-MCNC: 77 MG/DL (ref 55–121)
HEMOGLOBIN: 8.8 G/DL (ref 14–18)
LDL CHOLESTEROL CALCULATED: 54 MG/DL
MCH RBC QN AUTO: 35.6 PG (ref 27–31)
MCHC RBC AUTO-ENTMCNC: 29.9 G/DL (ref 33–37)
MCV RBC AUTO: 119 FL (ref 80–94)
PDW BLD-RTO: 25.9 % (ref 11.5–14.5)
PLATELET # BLD: 124 K/UL (ref 130–400)
POTASSIUM SERPL-SCNC: 4.2 MMOL/L (ref 3.5–5)
PROSTATE SPECIFIC ANTIGEN: 0.87 NG/ML (ref 0–4)
RBC # BLD: 2.47 M/UL (ref 4.7–6.1)
SODIUM BLD-SCNC: 141 MMOL/L (ref 136–145)
TOTAL PROTEIN: 7.5 G/DL (ref 6.6–8.7)
TRIGL SERPL-MCNC: 66 MG/DL (ref 0–149)
TSH SERPL DL<=0.05 MIU/L-ACNC: 1.74 UIU/ML (ref 0.27–4.2)
VITAMIN D 25-HYDROXY: 28.4 NG/ML
WBC # BLD: 5.8 K/UL (ref 4.8–10.8)

## 2019-03-05 PROCEDURE — G8482 FLU IMMUNIZE ORDER/ADMIN: HCPCS | Performed by: NURSE PRACTITIONER

## 2019-03-05 PROCEDURE — G8420 CALC BMI NORM PARAMETERS: HCPCS | Performed by: NURSE PRACTITIONER

## 2019-03-05 PROCEDURE — 1123F ACP DISCUSS/DSCN MKR DOCD: CPT | Performed by: NURSE PRACTITIONER

## 2019-03-05 PROCEDURE — 1036F TOBACCO NON-USER: CPT | Performed by: NURSE PRACTITIONER

## 2019-03-05 PROCEDURE — G8427 DOCREV CUR MEDS BY ELIG CLIN: HCPCS | Performed by: NURSE PRACTITIONER

## 2019-03-05 PROCEDURE — 3023F SPIROM DOC REV: CPT | Performed by: NURSE PRACTITIONER

## 2019-03-05 PROCEDURE — 1101F PT FALLS ASSESS-DOCD LE1/YR: CPT | Performed by: NURSE PRACTITIONER

## 2019-03-05 PROCEDURE — G0438 PPPS, INITIAL VISIT: HCPCS | Performed by: NURSE PRACTITIONER

## 2019-03-05 PROCEDURE — G8598 ASA/ANTIPLAT THER USED: HCPCS | Performed by: NURSE PRACTITIONER

## 2019-03-05 PROCEDURE — 4040F PNEUMOC VAC/ADMIN/RCVD: CPT | Performed by: NURSE PRACTITIONER

## 2019-03-05 PROCEDURE — 99214 OFFICE O/P EST MOD 30 MIN: CPT | Performed by: NURSE PRACTITIONER

## 2019-03-05 PROCEDURE — G8926 SPIRO NO PERF OR DOC: HCPCS | Performed by: NURSE PRACTITIONER

## 2019-03-05 RX ORDER — FLUTICASONE FUROATE, UMECLIDINIUM BROMIDE AND VILANTEROL TRIFENATATE 100; 62.5; 25 UG/1; UG/1; UG/1
POWDER RESPIRATORY (INHALATION)
COMMUNITY
Start: 2019-03-01

## 2019-03-05 ASSESSMENT — ENCOUNTER SYMPTOMS
ABDOMINAL DISTENTION: 0
EYE ITCHING: 0
STRIDOR: 0
DIARRHEA: 0
CHOKING: 0
BLOOD IN STOOL: 0
CONSTIPATION: 0
EYE DISCHARGE: 0
COLOR CHANGE: 0
NAUSEA: 0
WHEEZING: 0
COUGH: 1
SORE THROAT: 0
VOMITING: 0
SHORTNESS OF BREATH: 1
TROUBLE SWALLOWING: 0
ABDOMINAL PAIN: 0

## 2019-03-05 ASSESSMENT — ANXIETY QUESTIONNAIRES: GAD7 TOTAL SCORE: 0

## 2019-03-05 ASSESSMENT — PATIENT HEALTH QUESTIONNAIRE - PHQ9
SUM OF ALL RESPONSES TO PHQ QUESTIONS 1-9: 0
SUM OF ALL RESPONSES TO PHQ QUESTIONS 1-9: 0

## 2019-03-05 ASSESSMENT — LIFESTYLE VARIABLES: HOW OFTEN DO YOU HAVE A DRINK CONTAINING ALCOHOL: 0

## 2019-03-08 ENCOUNTER — INFUSION (OUTPATIENT)
Dept: ONCOLOGY | Facility: HOSPITAL | Age: 79
End: 2019-03-08

## 2019-03-08 ENCOUNTER — LAB (OUTPATIENT)
Dept: LAB | Facility: HOSPITAL | Age: 79
End: 2019-03-08

## 2019-03-08 VITALS
HEIGHT: 71 IN | DIASTOLIC BLOOD PRESSURE: 59 MMHG | TEMPERATURE: 97.7 F | RESPIRATION RATE: 18 BRPM | SYSTOLIC BLOOD PRESSURE: 126 MMHG | HEART RATE: 73 BPM | WEIGHT: 161 LBS | OXYGEN SATURATION: 99 % | BODY MASS INDEX: 22.54 KG/M2

## 2019-03-08 DIAGNOSIS — D50.9 IRON DEFICIENCY ANEMIA, UNSPECIFIED IRON DEFICIENCY ANEMIA TYPE: ICD-10-CM

## 2019-03-08 DIAGNOSIS — D64.9 ANEMIA, UNSPECIFIED TYPE: ICD-10-CM

## 2019-03-08 DIAGNOSIS — D46.9 MDS (MYELODYSPLASTIC SYNDROME) (HCC): Primary | ICD-10-CM

## 2019-03-08 DIAGNOSIS — D46.9 MYELODYSPLASTIC SYNDROME (HCC): ICD-10-CM

## 2019-03-08 LAB
BASOPHILS # BLD AUTO: 0.03 10*3/MM3 (ref 0–0.2)
BASOPHILS NFR BLD AUTO: 0.5 % (ref 0–2)
DEPRECATED RDW RBC AUTO: 104.4 FL (ref 40–54)
EOSINOPHIL # BLD AUTO: 0.17 10*3/MM3 (ref 0–0.7)
EOSINOPHIL NFR BLD AUTO: 2.8 % (ref 0–4)
ERYTHROCYTE [DISTWIDTH] IN BLOOD BY AUTOMATED COUNT: 26.1 % (ref 12–15)
HCT VFR BLD AUTO: 29 % (ref 40–52)
HGB BLD-MCNC: 9.1 G/DL (ref 14–18)
LYMPHOCYTES # BLD AUTO: 1.74 10*3/MM3 (ref 0.72–4.86)
LYMPHOCYTES NFR BLD AUTO: 28.6 % (ref 15–45)
MCH RBC QN AUTO: 35.3 PG (ref 28–32)
MCHC RBC AUTO-ENTMCNC: 31.4 G/DL (ref 33–36)
MCV RBC AUTO: 112.4 FL (ref 82–95)
MONOCYTES # BLD AUTO: 0.53 10*3/MM3 (ref 0.19–1.3)
MONOCYTES NFR BLD AUTO: 8.7 % (ref 4–12)
NEUTROPHILS # BLD AUTO: 3.57 10*3/MM3 (ref 1.87–8.4)
NEUTROPHILS NFR BLD AUTO: 58.7 % (ref 39–78)
PLATELET # BLD AUTO: 105 10*3/MM3 (ref 130–400)
PMV BLD AUTO: 13.6 FL (ref 6–12)
RBC # BLD AUTO: 2.58 10*6/MM3 (ref 4.8–5.9)
WBC NRBC COR # BLD: 6.08 10*3/MM3 (ref 4.8–10.8)

## 2019-03-08 PROCEDURE — 36415 COLL VENOUS BLD VENIPUNCTURE: CPT

## 2019-03-08 PROCEDURE — 25010000002 EPOETIN ALFA PER 1000 UNITS: Performed by: INTERNAL MEDICINE

## 2019-03-08 PROCEDURE — 85025 COMPLETE CBC W/AUTO DIFF WBC: CPT | Performed by: INTERNAL MEDICINE

## 2019-03-08 PROCEDURE — 96372 THER/PROPH/DIAG INJ SC/IM: CPT

## 2019-03-08 RX ADMIN — ERYTHROPOIETIN 40000 UNITS: 40000 INJECTION, SOLUTION INTRAVENOUS; SUBCUTANEOUS at 13:46

## 2019-03-15 ENCOUNTER — INFUSION (OUTPATIENT)
Dept: ONCOLOGY | Facility: HOSPITAL | Age: 79
End: 2019-03-15

## 2019-03-15 ENCOUNTER — LAB (OUTPATIENT)
Dept: LAB | Facility: HOSPITAL | Age: 79
End: 2019-03-15

## 2019-03-15 VITALS
HEIGHT: 71 IN | RESPIRATION RATE: 18 BRPM | BODY MASS INDEX: 22.76 KG/M2 | SYSTOLIC BLOOD PRESSURE: 148 MMHG | WEIGHT: 162.6 LBS | TEMPERATURE: 98.1 F | HEART RATE: 71 BPM | OXYGEN SATURATION: 100 % | DIASTOLIC BLOOD PRESSURE: 59 MMHG

## 2019-03-15 DIAGNOSIS — D46.9 MYELODYSPLASTIC SYNDROME (HCC): ICD-10-CM

## 2019-03-15 DIAGNOSIS — D46.9 MDS (MYELODYSPLASTIC SYNDROME) (HCC): Primary | ICD-10-CM

## 2019-03-15 DIAGNOSIS — D50.9 IRON DEFICIENCY ANEMIA, UNSPECIFIED IRON DEFICIENCY ANEMIA TYPE: ICD-10-CM

## 2019-03-15 DIAGNOSIS — D64.9 ANEMIA, UNSPECIFIED TYPE: ICD-10-CM

## 2019-03-15 LAB
BASOPHILS # BLD AUTO: 0.03 10*3/MM3 (ref 0–0.2)
BASOPHILS NFR BLD AUTO: 0.5 % (ref 0–2)
DEPRECATED RDW RBC AUTO: 103.6 FL (ref 40–54)
EOSINOPHIL # BLD AUTO: 0.19 10*3/MM3 (ref 0–0.7)
EOSINOPHIL NFR BLD AUTO: 3.2 % (ref 0–4)
ERYTHROCYTE [DISTWIDTH] IN BLOOD BY AUTOMATED COUNT: 25.4 % (ref 12–15)
HCT VFR BLD AUTO: 26.9 % (ref 40–52)
HGB BLD-MCNC: 8.4 G/DL (ref 14–18)
IMM GRANULOCYTES # BLD AUTO: 0.05 10*3/MM3 (ref 0–0.05)
IMM GRANULOCYTES NFR BLD AUTO: 0.8 % (ref 0–5)
LYMPHOCYTES # BLD AUTO: 1.57 10*3/MM3 (ref 0.72–4.86)
LYMPHOCYTES NFR BLD AUTO: 26.7 % (ref 15–45)
MCH RBC QN AUTO: 35.3 PG (ref 28–32)
MCHC RBC AUTO-ENTMCNC: 31.2 G/DL (ref 33–36)
MCV RBC AUTO: 113 FL (ref 82–95)
MONOCYTES # BLD AUTO: 0.55 10*3/MM3 (ref 0.19–1.3)
MONOCYTES NFR BLD AUTO: 9.3 % (ref 4–12)
NEUTROPHILS # BLD AUTO: 3.5 10*3/MM3 (ref 1.87–8.4)
NEUTROPHILS NFR BLD AUTO: 59.5 % (ref 39–78)
NRBC BLD AUTO-RTO: 0.5 /100 WBC (ref 0–0)
PLATELET # BLD AUTO: 104 10*3/MM3 (ref 130–400)
RBC # BLD AUTO: 2.38 10*6/MM3 (ref 4.8–5.9)
WBC NRBC COR # BLD: 5.89 10*3/MM3 (ref 4.8–10.8)

## 2019-03-15 PROCEDURE — 25010000002 EPOETIN ALFA PER 1000 UNITS: Performed by: INTERNAL MEDICINE

## 2019-03-15 PROCEDURE — 85025 COMPLETE CBC W/AUTO DIFF WBC: CPT | Performed by: INTERNAL MEDICINE

## 2019-03-15 PROCEDURE — 36415 COLL VENOUS BLD VENIPUNCTURE: CPT

## 2019-03-15 PROCEDURE — 96372 THER/PROPH/DIAG INJ SC/IM: CPT

## 2019-03-15 RX ADMIN — ERYTHROPOIETIN 40000 UNITS: 40000 INJECTION, SOLUTION INTRAVENOUS; SUBCUTANEOUS at 12:14

## 2019-03-22 ENCOUNTER — INFUSION (OUTPATIENT)
Dept: ONCOLOGY | Facility: HOSPITAL | Age: 79
End: 2019-03-22

## 2019-03-22 ENCOUNTER — LAB (OUTPATIENT)
Dept: LAB | Facility: HOSPITAL | Age: 79
End: 2019-03-22

## 2019-03-22 VITALS
BODY MASS INDEX: 22.76 KG/M2 | DIASTOLIC BLOOD PRESSURE: 58 MMHG | SYSTOLIC BLOOD PRESSURE: 135 MMHG | HEART RATE: 75 BPM | TEMPERATURE: 98 F | HEIGHT: 71 IN | WEIGHT: 162.6 LBS | RESPIRATION RATE: 18 BRPM | OXYGEN SATURATION: 100 %

## 2019-03-22 DIAGNOSIS — D46.9 MYELODYSPLASTIC SYNDROME (HCC): ICD-10-CM

## 2019-03-22 DIAGNOSIS — D64.9 ANEMIA, UNSPECIFIED TYPE: ICD-10-CM

## 2019-03-22 DIAGNOSIS — D50.9 IRON DEFICIENCY ANEMIA, UNSPECIFIED IRON DEFICIENCY ANEMIA TYPE: ICD-10-CM

## 2019-03-22 DIAGNOSIS — D46.9 MDS (MYELODYSPLASTIC SYNDROME) (HCC): Primary | ICD-10-CM

## 2019-03-22 LAB
ANISOCYTOSIS BLD QL: ABNORMAL
CLUMPED PLATELETS: PRESENT
DEPRECATED RDW RBC AUTO: 103.7 FL (ref 40–54)
EOSINOPHIL # BLD MANUAL: 0.32 10*3/MM3 (ref 0–0.7)
EOSINOPHIL NFR BLD MANUAL: 6.3 % (ref 0–4)
ERYTHROCYTE [DISTWIDTH] IN BLOOD BY AUTOMATED COUNT: 25.2 % (ref 12–15)
GIANT PLATELETS: ABNORMAL
HCT VFR BLD AUTO: 26.7 % (ref 40–52)
HGB BLD-MCNC: 8.3 G/DL (ref 14–18)
HYPOCHROMIA BLD QL: ABNORMAL
LYMPHOCYTES # BLD MANUAL: 0.7 10*3/MM3 (ref 0.72–4.86)
LYMPHOCYTES NFR BLD MANUAL: 13.5 % (ref 15–45)
LYMPHOCYTES NFR BLD MANUAL: 8.3 % (ref 4–12)
MACROCYTES BLD QL SMEAR: ABNORMAL
MCH RBC QN AUTO: 35.8 PG (ref 28–32)
MCHC RBC AUTO-ENTMCNC: 31.1 G/DL (ref 33–36)
MCV RBC AUTO: 115.1 FL (ref 82–95)
MONOCYTES # BLD AUTO: 0.43 10*3/MM3 (ref 0.19–1.3)
NEUTROPHILS # BLD AUTO: 3.49 10*3/MM3 (ref 1.87–8.4)
NEUTROPHILS NFR BLD MANUAL: 59.4 % (ref 39–78)
NEUTS BAND NFR BLD MANUAL: 8.3 % (ref 0–10)
PLATELET # BLD AUTO: 102 10*3/MM3 (ref 130–400)
POIKILOCYTOSIS BLD QL SMEAR: ABNORMAL
POLYCHROMASIA BLD QL SMEAR: ABNORMAL
RBC # BLD AUTO: 2.32 10*6/MM3 (ref 4.8–5.9)
SCHISTOCYTES BLD QL SMEAR: ABNORMAL
SMALL PLATELETS BLD QL SMEAR: ABNORMAL
VARIANT LYMPHS NFR BLD MANUAL: 4.2 % (ref 0–5)
WBC MORPH BLD: NORMAL
WBC NRBC COR # BLD: 5.15 10*3/MM3 (ref 4.8–10.8)

## 2019-03-22 PROCEDURE — 25010000002 EPOETIN ALFA PER 1000 UNITS: Performed by: INTERNAL MEDICINE

## 2019-03-22 PROCEDURE — 85007 BL SMEAR W/DIFF WBC COUNT: CPT | Performed by: INTERNAL MEDICINE

## 2019-03-22 PROCEDURE — 96372 THER/PROPH/DIAG INJ SC/IM: CPT

## 2019-03-22 PROCEDURE — 36415 COLL VENOUS BLD VENIPUNCTURE: CPT

## 2019-03-22 PROCEDURE — 85025 COMPLETE CBC W/AUTO DIFF WBC: CPT | Performed by: INTERNAL MEDICINE

## 2019-03-22 RX ADMIN — ERYTHROPOIETIN 40000 UNITS: 40000 INJECTION, SOLUTION INTRAVENOUS; SUBCUTANEOUS at 12:04

## 2019-03-29 ENCOUNTER — LAB (OUTPATIENT)
Dept: LAB | Facility: HOSPITAL | Age: 79
End: 2019-03-29

## 2019-03-29 ENCOUNTER — INFUSION (OUTPATIENT)
Dept: ONCOLOGY | Facility: HOSPITAL | Age: 79
End: 2019-03-29

## 2019-03-29 VITALS
RESPIRATION RATE: 20 BRPM | OXYGEN SATURATION: 99 % | TEMPERATURE: 98 F | HEIGHT: 71 IN | WEIGHT: 163 LBS | BODY MASS INDEX: 22.82 KG/M2 | SYSTOLIC BLOOD PRESSURE: 135 MMHG | HEART RATE: 73 BPM | DIASTOLIC BLOOD PRESSURE: 57 MMHG

## 2019-03-29 DIAGNOSIS — D46.9 MDS (MYELODYSPLASTIC SYNDROME) (HCC): Primary | ICD-10-CM

## 2019-03-29 DIAGNOSIS — D61.811 OTHER DRUG-INDUCED PANCYTOPENIA (HCC): Primary | ICD-10-CM

## 2019-03-29 DIAGNOSIS — D46.9 MYELODYSPLASTIC SYNDROME (HCC): ICD-10-CM

## 2019-03-29 DIAGNOSIS — D64.9 ANEMIA, UNSPECIFIED TYPE: ICD-10-CM

## 2019-03-29 DIAGNOSIS — D50.9 IRON DEFICIENCY ANEMIA, UNSPECIFIED IRON DEFICIENCY ANEMIA TYPE: ICD-10-CM

## 2019-03-29 LAB
ANISOCYTOSIS BLD QL: NORMAL
C3 FRG RBC-MCNC: NORMAL
DEPRECATED RDW RBC AUTO: 99.6 FL (ref 40–54)
EOSINOPHIL # BLD MANUAL: 0.15 10*3/MM3 (ref 0–0.7)
EOSINOPHIL NFR BLD MANUAL: 3 % (ref 0–4)
ERYTHROCYTE [DISTWIDTH] IN BLOOD BY AUTOMATED COUNT: 25.1 % (ref 12–15)
FERRITIN SERPL-MCNC: 878 NG/ML (ref 17.9–464)
HCT VFR BLD AUTO: 25.7 % (ref 40–52)
HGB BLD-MCNC: 8 G/DL (ref 14–18)
HOLD SPECIMEN: NORMAL
HYPOCHROMIA BLD QL: NORMAL
IRON 24H UR-MRATE: 115 MCG/DL (ref 42–180)
IRON 24H UR-MRATE: 115 MCG/DL (ref 42–180)
IRON SATN MFR SERPL: 48 % (ref 20–45)
LYMPHOCYTES # BLD MANUAL: 1.84 10*3/MM3 (ref 0.72–4.86)
LYMPHOCYTES NFR BLD MANUAL: 10 % (ref 4–12)
LYMPHOCYTES NFR BLD MANUAL: 37 % (ref 15–45)
MCH RBC QN AUTO: 34.9 PG (ref 28–32)
MCHC RBC AUTO-ENTMCNC: 31.1 G/DL (ref 33–36)
MCV RBC AUTO: 112.2 FL (ref 82–95)
MONOCYTES # BLD AUTO: 0.5 10*3/MM3 (ref 0.19–1.3)
NEUTROPHILS # BLD AUTO: 2.49 10*3/MM3 (ref 1.87–8.4)
NEUTROPHILS NFR BLD MANUAL: 50 % (ref 39–78)
NRBC BLD AUTO-RTO: 0.6 /100 WBC (ref 0–0)
PLATELET # BLD AUTO: 90 10*3/MM3 (ref 130–400)
PMV BLD AUTO: ABNORMAL FL (ref 6–12)
POIKILOCYTOSIS BLD QL SMEAR: NORMAL
RBC # BLD AUTO: 2.29 10*6/MM3 (ref 4.8–5.9)
SMALL PLATELETS BLD QL SMEAR: NORMAL
TIBC SERPL-MCNC: 239 MCG/DL (ref 225–420)
WBC MORPH BLD: NORMAL
WBC NRBC COR # BLD: 4.97 10*3/MM3 (ref 4.8–10.8)

## 2019-03-29 PROCEDURE — 36415 COLL VENOUS BLD VENIPUNCTURE: CPT

## 2019-03-29 PROCEDURE — 25010000002 EPOETIN ALFA PER 1000 UNITS: Performed by: INTERNAL MEDICINE

## 2019-03-29 PROCEDURE — 83550 IRON BINDING TEST: CPT | Performed by: INTERNAL MEDICINE

## 2019-03-29 PROCEDURE — 83540 ASSAY OF IRON: CPT | Performed by: INTERNAL MEDICINE

## 2019-03-29 PROCEDURE — 85007 BL SMEAR W/DIFF WBC COUNT: CPT | Performed by: INTERNAL MEDICINE

## 2019-03-29 PROCEDURE — 96372 THER/PROPH/DIAG INJ SC/IM: CPT

## 2019-03-29 PROCEDURE — 85025 COMPLETE CBC W/AUTO DIFF WBC: CPT | Performed by: INTERNAL MEDICINE

## 2019-03-29 PROCEDURE — 82728 ASSAY OF FERRITIN: CPT | Performed by: INTERNAL MEDICINE

## 2019-03-29 RX ADMIN — ERYTHROPOIETIN 40000 UNITS: 40000 INJECTION, SOLUTION INTRAVENOUS; SUBCUTANEOUS at 12:50

## 2019-04-05 ENCOUNTER — INFUSION (OUTPATIENT)
Dept: ONCOLOGY | Facility: HOSPITAL | Age: 79
End: 2019-04-05

## 2019-04-05 ENCOUNTER — LAB (OUTPATIENT)
Dept: LAB | Facility: HOSPITAL | Age: 79
End: 2019-04-05

## 2019-04-05 VITALS
OXYGEN SATURATION: 100 % | TEMPERATURE: 98 F | BODY MASS INDEX: 22.76 KG/M2 | SYSTOLIC BLOOD PRESSURE: 135 MMHG | HEIGHT: 71 IN | RESPIRATION RATE: 20 BRPM | DIASTOLIC BLOOD PRESSURE: 70 MMHG | HEART RATE: 79 BPM | WEIGHT: 162.6 LBS

## 2019-04-05 DIAGNOSIS — D53.9 MACROCYTIC ANEMIA: ICD-10-CM

## 2019-04-05 DIAGNOSIS — D46.9 MDS (MYELODYSPLASTIC SYNDROME) (HCC): Primary | ICD-10-CM

## 2019-04-05 DIAGNOSIS — D46.9 MYELODYSPLASTIC SYNDROME (HCC): ICD-10-CM

## 2019-04-05 DIAGNOSIS — D50.9 IRON DEFICIENCY ANEMIA, UNSPECIFIED IRON DEFICIENCY ANEMIA TYPE: Primary | ICD-10-CM

## 2019-04-05 LAB
ANISOCYTOSIS BLD QL: NORMAL
BASOPHILS # BLD AUTO: 0.03 10*3/MM3 (ref 0–0.2)
BASOPHILS NFR BLD AUTO: 0.5 % (ref 0–2)
DEPRECATED RDW RBC AUTO: 102.6 FL (ref 40–54)
EOSINOPHIL # BLD AUTO: 0.13 10*3/MM3 (ref 0–0.7)
EOSINOPHIL NFR BLD AUTO: 2.3 % (ref 0–4)
EOSINOPHIL NFR BLD MANUAL: 2 % (ref 0–4)
ERYTHROCYTE [DISTWIDTH] IN BLOOD BY AUTOMATED COUNT: 25 % (ref 12–15)
HCT VFR BLD AUTO: 27.3 % (ref 40–52)
HGB BLD-MCNC: 8.4 G/DL (ref 14–18)
HYPOCHROMIA BLD QL: NORMAL
LYMPHOCYTES # BLD AUTO: 1.57 10*3/MM3 (ref 0.72–4.86)
LYMPHOCYTES # BLD MANUAL: NORMAL 10*3/MM3 (ref 0.72–4.86)
LYMPHOCYTES NFR BLD AUTO: 27.9 % (ref 15–45)
LYMPHOCYTES NFR BLD MANUAL: 24 % (ref 15–45)
LYMPHOCYTES NFR BLD MANUAL: 4 % (ref 4–12)
MCH RBC QN AUTO: 34.7 PG (ref 28–32)
MCHC RBC AUTO-ENTMCNC: 30.8 G/DL (ref 33–36)
MCV RBC AUTO: 112.8 FL (ref 82–95)
MONOCYTES # BLD AUTO: 0.44 10*3/MM3 (ref 0.19–1.3)
MONOCYTES NFR BLD AUTO: 7.8 % (ref 4–12)
NEUTROPHILS # BLD AUTO: 3.43 10*3/MM3 (ref 1.87–8.4)
NEUTROPHILS # BLD AUTO: NORMAL 10*3/MM3 (ref 1.87–8.4)
NEUTROPHILS NFR BLD AUTO: 61 % (ref 39–78)
NEUTROPHILS NFR BLD MANUAL: 69 % (ref 39–78)
PLATELET # BLD AUTO: 97 10*3/MM3 (ref 130–400)
PMV BLD AUTO: 13.7 FL (ref 6–12)
POIKILOCYTOSIS BLD QL SMEAR: NORMAL
RBC # BLD AUTO: 2.42 10*6/MM3 (ref 4.8–5.9)
SMALL PLATELETS BLD QL SMEAR: NORMAL
VARIANT LYMPHS NFR BLD MANUAL: 1 % (ref 0–5)
WBC MORPH BLD: NORMAL
WBC NRBC COR # BLD: 5.63 10*3/MM3 (ref 4.8–10.8)

## 2019-04-05 PROCEDURE — 36415 COLL VENOUS BLD VENIPUNCTURE: CPT

## 2019-04-05 PROCEDURE — 25010000002 EPOETIN ALFA PER 1000 UNITS: Performed by: INTERNAL MEDICINE

## 2019-04-05 PROCEDURE — 85025 COMPLETE CBC W/AUTO DIFF WBC: CPT | Performed by: INTERNAL MEDICINE

## 2019-04-05 PROCEDURE — 96372 THER/PROPH/DIAG INJ SC/IM: CPT

## 2019-04-05 PROCEDURE — 85007 BL SMEAR W/DIFF WBC COUNT: CPT | Performed by: INTERNAL MEDICINE

## 2019-04-05 RX ADMIN — ERYTHROPOIETIN 40000 UNITS: 40000 INJECTION, SOLUTION INTRAVENOUS; SUBCUTANEOUS at 14:39

## 2019-04-12 ENCOUNTER — INFUSION (OUTPATIENT)
Dept: ONCOLOGY | Facility: HOSPITAL | Age: 79
End: 2019-04-12

## 2019-04-12 ENCOUNTER — LAB (OUTPATIENT)
Dept: LAB | Facility: HOSPITAL | Age: 79
End: 2019-04-12

## 2019-04-12 VITALS
RESPIRATION RATE: 18 BRPM | OXYGEN SATURATION: 99 % | WEIGHT: 162 LBS | SYSTOLIC BLOOD PRESSURE: 141 MMHG | TEMPERATURE: 97.5 F | BODY MASS INDEX: 22.68 KG/M2 | HEART RATE: 71 BPM | HEIGHT: 71 IN | DIASTOLIC BLOOD PRESSURE: 65 MMHG

## 2019-04-12 DIAGNOSIS — D46.9 MDS (MYELODYSPLASTIC SYNDROME) (HCC): Primary | ICD-10-CM

## 2019-04-12 DIAGNOSIS — D50.9 IRON DEFICIENCY ANEMIA, UNSPECIFIED IRON DEFICIENCY ANEMIA TYPE: ICD-10-CM

## 2019-04-12 DIAGNOSIS — D46.9 MYELODYSPLASTIC SYNDROME (HCC): ICD-10-CM

## 2019-04-12 DIAGNOSIS — D53.9 MACROCYTIC ANEMIA: ICD-10-CM

## 2019-04-12 DIAGNOSIS — D64.9 ANEMIA, UNSPECIFIED TYPE: ICD-10-CM

## 2019-04-12 LAB
ALBUMIN SERPL-MCNC: 4.2 G/DL (ref 3.5–5)
ALBUMIN/GLOB SERPL: 1.4 G/DL (ref 1.1–2.5)
ALP SERPL-CCNC: 88 U/L (ref 24–120)
ALT SERPL W P-5'-P-CCNC: 18 U/L (ref 0–54)
ANION GAP SERPL CALCULATED.3IONS-SCNC: 9 MMOL/L (ref 4–13)
ANISOCYTOSIS BLD QL: ABNORMAL
AST SERPL-CCNC: 35 U/L (ref 7–45)
BILIRUB SERPL-MCNC: 1.2 MG/DL (ref 0.1–1)
BUN BLD-MCNC: 23 MG/DL (ref 5–21)
BUN/CREAT SERPL: 20.9 (ref 7–25)
CALCIUM SPEC-SCNC: 9 MG/DL (ref 8.4–10.4)
CHLORIDE SERPL-SCNC: 102 MMOL/L (ref 98–110)
CO2 SERPL-SCNC: 28 MMOL/L (ref 24–31)
CREAT BLD-MCNC: 1.1 MG/DL (ref 0.5–1.4)
DEPRECATED RDW RBC AUTO: 99.6 FL (ref 40–54)
EOSINOPHIL # BLD MANUAL: 0.16 10*3/MM3 (ref 0–0.7)
EOSINOPHIL NFR BLD MANUAL: 3 % (ref 0–4)
ERYTHROCYTE [DISTWIDTH] IN BLOOD BY AUTOMATED COUNT: 24.9 % (ref 12–15)
GFR SERPL CREATININE-BSD FRML MDRD: 64 ML/MIN/1.73
GLOBULIN UR ELPH-MCNC: 3 GM/DL
GLUCOSE BLD-MCNC: 90 MG/DL (ref 70–100)
HCT VFR BLD AUTO: 28.3 % (ref 40–52)
HGB BLD-MCNC: 9 G/DL (ref 14–18)
HOLD SPECIMEN: NORMAL
HYPOCHROMIA BLD QL: ABNORMAL
LYMPHOCYTES # BLD MANUAL: 1.2 10*3/MM3 (ref 0.72–4.86)
LYMPHOCYTES NFR BLD MANUAL: 23 % (ref 15–45)
LYMPHOCYTES NFR BLD MANUAL: 8 % (ref 4–12)
MACROCYTES BLD QL SMEAR: ABNORMAL
MCH RBC QN AUTO: 35.6 PG (ref 28–32)
MCHC RBC AUTO-ENTMCNC: 31.8 G/DL (ref 33–36)
MCV RBC AUTO: 111.9 FL (ref 82–95)
MONOCYTES # BLD AUTO: 0.42 10*3/MM3 (ref 0.19–1.3)
NEUTROPHILS # BLD AUTO: 3.07 10*3/MM3 (ref 1.87–8.4)
NEUTROPHILS NFR BLD MANUAL: 58 % (ref 39–78)
NEUTS BAND NFR BLD MANUAL: 1 % (ref 0–10)
NRBC BLD AUTO-RTO: 0.6 /100 WBC (ref 0–0)
PLATELET # BLD AUTO: 85 10*3/MM3 (ref 130–400)
POTASSIUM BLD-SCNC: 4.1 MMOL/L (ref 3.5–5.3)
PROT SERPL-MCNC: 7.2 G/DL (ref 6.3–8.7)
RBC # BLD AUTO: 2.53 10*6/MM3 (ref 4.8–5.9)
SCHISTOCYTES BLD QL SMEAR: ABNORMAL
SMALL PLATELETS BLD QL SMEAR: ABNORMAL
SODIUM BLD-SCNC: 139 MMOL/L (ref 135–145)
STOMATOCYTES BLD QL SMEAR: ABNORMAL
VARIANT LYMPHS NFR BLD MANUAL: 7 % (ref 0–5)
WBC MORPH BLD: NORMAL
WBC NRBC COR # BLD: 5.2 10*3/MM3 (ref 4.8–10.8)

## 2019-04-12 PROCEDURE — 85007 BL SMEAR W/DIFF WBC COUNT: CPT | Performed by: INTERNAL MEDICINE

## 2019-04-12 PROCEDURE — 85025 COMPLETE CBC W/AUTO DIFF WBC: CPT | Performed by: INTERNAL MEDICINE

## 2019-04-12 PROCEDURE — 25010000002 EPOETIN ALFA PER 1000 UNITS: Performed by: INTERNAL MEDICINE

## 2019-04-12 PROCEDURE — 36415 COLL VENOUS BLD VENIPUNCTURE: CPT

## 2019-04-12 PROCEDURE — 80053 COMPREHEN METABOLIC PANEL: CPT | Performed by: INTERNAL MEDICINE

## 2019-04-12 PROCEDURE — 96372 THER/PROPH/DIAG INJ SC/IM: CPT

## 2019-04-12 RX ADMIN — ERYTHROPOIETIN 40000 UNITS: 40000 INJECTION, SOLUTION INTRAVENOUS; SUBCUTANEOUS at 12:25

## 2019-04-19 ENCOUNTER — LAB (OUTPATIENT)
Dept: LAB | Facility: HOSPITAL | Age: 79
End: 2019-04-19

## 2019-04-19 ENCOUNTER — INFUSION (OUTPATIENT)
Dept: ONCOLOGY | Facility: HOSPITAL | Age: 79
End: 2019-04-19

## 2019-04-19 VITALS
HEIGHT: 70 IN | OXYGEN SATURATION: 95 % | SYSTOLIC BLOOD PRESSURE: 122 MMHG | BODY MASS INDEX: 22.9 KG/M2 | RESPIRATION RATE: 20 BRPM | DIASTOLIC BLOOD PRESSURE: 54 MMHG | WEIGHT: 160 LBS | HEART RATE: 70 BPM | TEMPERATURE: 97.7 F

## 2019-04-19 DIAGNOSIS — D46.9 MDS (MYELODYSPLASTIC SYNDROME) (HCC): Primary | ICD-10-CM

## 2019-04-19 DIAGNOSIS — D64.9 ANEMIA, UNSPECIFIED TYPE: ICD-10-CM

## 2019-04-19 DIAGNOSIS — D46.9 MYELODYSPLASTIC SYNDROME (HCC): ICD-10-CM

## 2019-04-19 DIAGNOSIS — D50.9 IRON DEFICIENCY ANEMIA, UNSPECIFIED IRON DEFICIENCY ANEMIA TYPE: ICD-10-CM

## 2019-04-19 LAB
ANISOCYTOSIS BLD QL: ABNORMAL
BASOPHILS # BLD MANUAL: 0.04 10*3/MM3 (ref 0–0.2)
BASOPHILS NFR BLD AUTO: 1 % (ref 0–2)
C3 FRG RBC-MCNC: ABNORMAL
DACRYOCYTES BLD QL SMEAR: ABNORMAL
DEPRECATED RDW RBC AUTO: 100.9 FL (ref 40–54)
ELLIPTOCYTES BLD QL SMEAR: ABNORMAL
EOSINOPHIL # BLD MANUAL: 0.22 10*3/MM3 (ref 0–0.7)
EOSINOPHIL NFR BLD MANUAL: 5 % (ref 0–4)
ERYTHROCYTE [DISTWIDTH] IN BLOOD BY AUTOMATED COUNT: 25.1 % (ref 12–15)
GIANT PLATELETS: ABNORMAL
HCT VFR BLD AUTO: 27.3 % (ref 40–52)
HGB BLD-MCNC: 8.4 G/DL (ref 14–18)
HYPOCHROMIA BLD QL: ABNORMAL
LYMPHOCYTES # BLD MANUAL: 1.17 10*3/MM3 (ref 0.72–4.86)
LYMPHOCYTES NFR BLD MANUAL: 27 % (ref 15–45)
LYMPHOCYTES NFR BLD MANUAL: 3 % (ref 4–12)
MACROCYTES BLD QL SMEAR: ABNORMAL
MCH RBC QN AUTO: 34.6 PG (ref 28–32)
MCHC RBC AUTO-ENTMCNC: 30.8 G/DL (ref 33–36)
MCV RBC AUTO: 112.3 FL (ref 82–95)
METAMYELOCYTES NFR BLD MANUAL: 2 % (ref 0–0)
MONOCYTES # BLD AUTO: 0.13 10*3/MM3 (ref 0.19–1.3)
MYELOCYTES NFR BLD MANUAL: 1 % (ref 0–0)
NEUTROPHILS # BLD AUTO: 2.42 10*3/MM3 (ref 1.87–8.4)
NEUTROPHILS NFR BLD MANUAL: 53 % (ref 39–78)
NEUTS BAND NFR BLD MANUAL: 3 % (ref 0–10)
NRBC SPEC MANUAL: 2 /100 WBC (ref 0–0.2)
PLATELET # BLD AUTO: 102 10*3/MM3 (ref 130–400)
PMV BLD AUTO: ABNORMAL FL (ref 6–12)
POIKILOCYTOSIS BLD QL SMEAR: ABNORMAL
POLYCHROMASIA BLD QL SMEAR: ABNORMAL
RBC # BLD AUTO: 2.43 10*6/MM3 (ref 4.8–5.9)
SMALL PLATELETS BLD QL SMEAR: ABNORMAL
VARIANT LYMPHS NFR BLD MANUAL: 5 % (ref 0–5)
WBC MORPH BLD: NORMAL
WBC NRBC COR # BLD: 4.33 10*3/MM3 (ref 4.8–10.8)

## 2019-04-19 PROCEDURE — 25010000002 EPOETIN ALFA PER 1000 UNITS: Performed by: INTERNAL MEDICINE

## 2019-04-19 PROCEDURE — 36415 COLL VENOUS BLD VENIPUNCTURE: CPT

## 2019-04-19 PROCEDURE — 85025 COMPLETE CBC W/AUTO DIFF WBC: CPT | Performed by: INTERNAL MEDICINE

## 2019-04-19 PROCEDURE — 96372 THER/PROPH/DIAG INJ SC/IM: CPT

## 2019-04-19 PROCEDURE — 85007 BL SMEAR W/DIFF WBC COUNT: CPT | Performed by: INTERNAL MEDICINE

## 2019-04-19 RX ADMIN — ERYTHROPOIETIN 40000 UNITS: 40000 INJECTION, SOLUTION INTRAVENOUS; SUBCUTANEOUS at 11:55

## 2019-04-26 ENCOUNTER — INFUSION (OUTPATIENT)
Dept: ONCOLOGY | Facility: HOSPITAL | Age: 79
End: 2019-04-26

## 2019-04-26 ENCOUNTER — LAB (OUTPATIENT)
Dept: LAB | Facility: HOSPITAL | Age: 79
End: 2019-04-26

## 2019-04-26 VITALS
TEMPERATURE: 97.8 F | OXYGEN SATURATION: 98 % | HEIGHT: 70 IN | HEART RATE: 71 BPM | WEIGHT: 159.4 LBS | BODY MASS INDEX: 22.82 KG/M2 | DIASTOLIC BLOOD PRESSURE: 58 MMHG | RESPIRATION RATE: 18 BRPM | SYSTOLIC BLOOD PRESSURE: 159 MMHG

## 2019-04-26 DIAGNOSIS — D50.9 IRON DEFICIENCY ANEMIA, UNSPECIFIED IRON DEFICIENCY ANEMIA TYPE: ICD-10-CM

## 2019-04-26 DIAGNOSIS — D46.9 MDS (MYELODYSPLASTIC SYNDROME) (HCC): Primary | ICD-10-CM

## 2019-04-26 DIAGNOSIS — D64.9 ANEMIA, UNSPECIFIED TYPE: ICD-10-CM

## 2019-04-26 DIAGNOSIS — D46.9 MYELODYSPLASTIC SYNDROME (HCC): ICD-10-CM

## 2019-04-26 LAB
ALBUMIN SERPL-MCNC: 3.9 G/DL (ref 3.5–5)
ALBUMIN/GLOB SERPL: 1.3 G/DL (ref 1.1–2.5)
ALP SERPL-CCNC: 96 U/L (ref 24–120)
ALT SERPL W P-5'-P-CCNC: <15 U/L (ref 0–54)
ANION GAP SERPL CALCULATED.3IONS-SCNC: 6 MMOL/L (ref 4–13)
AST SERPL-CCNC: 30 U/L (ref 7–45)
BASOPHILS # BLD AUTO: 0.04 10*3/MM3 (ref 0–0.2)
BASOPHILS NFR BLD AUTO: 0.8 % (ref 0–2)
BILIRUB SERPL-MCNC: 0.9 MG/DL (ref 0.1–1)
BUN BLD-MCNC: 17 MG/DL (ref 5–21)
BUN/CREAT SERPL: 15.2 (ref 7–25)
CALCIUM SPEC-SCNC: 9 MG/DL (ref 8.4–10.4)
CHLORIDE SERPL-SCNC: 103 MMOL/L (ref 98–110)
CO2 SERPL-SCNC: 30 MMOL/L (ref 24–31)
CREAT BLD-MCNC: 1.12 MG/DL (ref 0.5–1.4)
DEPRECATED RDW RBC AUTO: 98.8 FL (ref 40–54)
EOSINOPHIL # BLD AUTO: 0.17 10*3/MM3 (ref 0–0.7)
EOSINOPHIL NFR BLD AUTO: 3.2 % (ref 0–4)
ERYTHROCYTE [DISTWIDTH] IN BLOOD BY AUTOMATED COUNT: 24.3 % (ref 12–15)
FERRITIN SERPL-MCNC: 735 NG/ML (ref 17.9–464)
GFR SERPL CREATININE-BSD FRML MDRD: 63 ML/MIN/1.73
GLOBULIN UR ELPH-MCNC: 3.1 GM/DL
GLUCOSE BLD-MCNC: 37 MG/DL (ref 70–100)
HCT VFR BLD AUTO: 27.7 % (ref 40–52)
HGB BLD-MCNC: 8.7 G/DL (ref 14–18)
IRON 24H UR-MRATE: 79 MCG/DL (ref 42–180)
IRON SATN MFR SERPL: 35 % (ref 20–45)
LYMPHOCYTES # BLD AUTO: 1.23 10*3/MM3 (ref 0.72–4.86)
LYMPHOCYTES NFR BLD AUTO: 23.1 % (ref 15–45)
MCH RBC QN AUTO: 34.9 PG (ref 28–32)
MCHC RBC AUTO-ENTMCNC: 31.4 G/DL (ref 33–36)
MCV RBC AUTO: 111.2 FL (ref 82–95)
MONOCYTES # BLD AUTO: 0.59 10*3/MM3 (ref 0.19–1.3)
MONOCYTES NFR BLD AUTO: 11.1 % (ref 4–12)
NEUTROPHILS # BLD AUTO: 3.26 10*3/MM3 (ref 1.87–8.4)
NEUTROPHILS NFR BLD AUTO: 61.2 % (ref 39–78)
PLATELET # BLD AUTO: 86 10*3/MM3 (ref 130–400)
PMV BLD AUTO: ABNORMAL FL (ref 6–12)
POTASSIUM BLD-SCNC: 3.9 MMOL/L (ref 3.5–5.3)
PROT SERPL-MCNC: 7 G/DL (ref 6.3–8.7)
RBC # BLD AUTO: 2.49 10*6/MM3 (ref 4.8–5.9)
SODIUM BLD-SCNC: 139 MMOL/L (ref 135–145)
TIBC SERPL-MCNC: 224 MCG/DL (ref 225–420)
WBC NRBC COR # BLD: 5.32 10*3/MM3 (ref 4.8–10.8)

## 2019-04-26 PROCEDURE — 25010000002 EPOETIN ALFA PER 1000 UNITS: Performed by: INTERNAL MEDICINE

## 2019-04-26 PROCEDURE — 36415 COLL VENOUS BLD VENIPUNCTURE: CPT

## 2019-04-26 PROCEDURE — 82728 ASSAY OF FERRITIN: CPT | Performed by: INTERNAL MEDICINE

## 2019-04-26 PROCEDURE — 83540 ASSAY OF IRON: CPT | Performed by: INTERNAL MEDICINE

## 2019-04-26 PROCEDURE — 96372 THER/PROPH/DIAG INJ SC/IM: CPT

## 2019-04-26 PROCEDURE — 80053 COMPREHEN METABOLIC PANEL: CPT | Performed by: INTERNAL MEDICINE

## 2019-04-26 PROCEDURE — 83550 IRON BINDING TEST: CPT | Performed by: INTERNAL MEDICINE

## 2019-04-26 PROCEDURE — 85025 COMPLETE CBC W/AUTO DIFF WBC: CPT | Performed by: INTERNAL MEDICINE

## 2019-04-26 RX ADMIN — ERYTHROPOIETIN 40000 UNITS: 40000 INJECTION, SOLUTION INTRAVENOUS; SUBCUTANEOUS at 12:33

## 2019-04-26 NOTE — PROGRESS NOTES
Glucose 37 today. Pt states has never had problems with glucose. Has not eaten today, only had 2 cups coffee. No c/o confusion, nausea, skin warm and dry. Pt did eat turkey sandwich, chips and cookies and drank coke while he was here. DC'd stable.

## 2019-05-03 ENCOUNTER — LAB (OUTPATIENT)
Dept: LAB | Facility: HOSPITAL | Age: 79
End: 2019-05-03

## 2019-05-03 ENCOUNTER — INFUSION (OUTPATIENT)
Dept: ONCOLOGY | Facility: HOSPITAL | Age: 79
End: 2019-05-03

## 2019-05-03 VITALS
WEIGHT: 158 LBS | HEART RATE: 67 BPM | DIASTOLIC BLOOD PRESSURE: 73 MMHG | HEIGHT: 70 IN | RESPIRATION RATE: 20 BRPM | SYSTOLIC BLOOD PRESSURE: 150 MMHG | OXYGEN SATURATION: 100 % | BODY MASS INDEX: 22.62 KG/M2 | TEMPERATURE: 97.8 F

## 2019-05-03 DIAGNOSIS — D46.9 MDS (MYELODYSPLASTIC SYNDROME) (HCC): Primary | ICD-10-CM

## 2019-05-03 DIAGNOSIS — D46.9 MYELODYSPLASTIC SYNDROME (HCC): ICD-10-CM

## 2019-05-03 DIAGNOSIS — D64.9 ANEMIA, UNSPECIFIED TYPE: ICD-10-CM

## 2019-05-03 DIAGNOSIS — D50.9 IRON DEFICIENCY ANEMIA, UNSPECIFIED IRON DEFICIENCY ANEMIA TYPE: ICD-10-CM

## 2019-05-03 LAB
ANISOCYTOSIS BLD QL: ABNORMAL
DEPRECATED RDW RBC AUTO: 98.6 FL (ref 40–54)
EOSINOPHIL # BLD MANUAL: 0.05 10*3/MM3 (ref 0–0.7)
EOSINOPHIL NFR BLD MANUAL: 1 % (ref 0–4)
ERYTHROCYTE [DISTWIDTH] IN BLOOD BY AUTOMATED COUNT: 24.5 % (ref 12–15)
HCT VFR BLD AUTO: 27.9 % (ref 40–52)
HGB BLD-MCNC: 8.7 G/DL (ref 14–18)
HOLD SPECIMEN: NORMAL
HYPOCHROMIA BLD QL: ABNORMAL
LYMPHOCYTES # BLD MANUAL: 1.01 10*3/MM3 (ref 0.72–4.86)
LYMPHOCYTES NFR BLD MANUAL: 21 % (ref 15–45)
LYMPHOCYTES NFR BLD MANUAL: 4 % (ref 4–12)
MACROCYTES BLD QL SMEAR: ABNORMAL
MCH RBC QN AUTO: 34.7 PG (ref 28–32)
MCHC RBC AUTO-ENTMCNC: 31.2 G/DL (ref 33–36)
MCV RBC AUTO: 111.2 FL (ref 82–95)
MONOCYTES # BLD AUTO: 0.19 10*3/MM3 (ref 0.19–1.3)
NEUTROPHILS # BLD AUTO: 3.51 10*3/MM3 (ref 1.87–8.4)
NEUTROPHILS NFR BLD MANUAL: 73 % (ref 39–78)
NRBC SPEC MANUAL: 1 /100 WBC (ref 0–0.2)
PLATELET # BLD AUTO: 83 10*3/MM3 (ref 130–400)
PMV BLD AUTO: ABNORMAL FL (ref 6–12)
POIKILOCYTOSIS BLD QL SMEAR: ABNORMAL
POLYCHROMASIA BLD QL SMEAR: ABNORMAL
RBC # BLD AUTO: 2.51 10*6/MM3 (ref 4.8–5.9)
SMALL PLATELETS BLD QL SMEAR: ABNORMAL
VARIANT LYMPHS NFR BLD MANUAL: 1 % (ref 0–5)
WBC MORPH BLD: NORMAL
WBC NRBC COR # BLD: 4.81 10*3/MM3 (ref 4.8–10.8)

## 2019-05-03 PROCEDURE — 85007 BL SMEAR W/DIFF WBC COUNT: CPT | Performed by: INTERNAL MEDICINE

## 2019-05-03 PROCEDURE — 96372 THER/PROPH/DIAG INJ SC/IM: CPT

## 2019-05-03 PROCEDURE — 85025 COMPLETE CBC W/AUTO DIFF WBC: CPT | Performed by: INTERNAL MEDICINE

## 2019-05-03 PROCEDURE — 36415 COLL VENOUS BLD VENIPUNCTURE: CPT

## 2019-05-03 PROCEDURE — 25010000002 EPOETIN ALFA PER 1000 UNITS: Performed by: INTERNAL MEDICINE

## 2019-05-03 RX ADMIN — ERYTHROPOIETIN 40000 UNITS: 40000 INJECTION, SOLUTION INTRAVENOUS; SUBCUTANEOUS at 12:38

## 2019-05-10 ENCOUNTER — LAB (OUTPATIENT)
Dept: LAB | Facility: HOSPITAL | Age: 79
End: 2019-05-10

## 2019-05-10 ENCOUNTER — TRANSCRIBE ORDERS (OUTPATIENT)
Dept: ONCOLOGY | Facility: CLINIC | Age: 79
End: 2019-05-10

## 2019-05-10 ENCOUNTER — INFUSION (OUTPATIENT)
Dept: ONCOLOGY | Facility: HOSPITAL | Age: 79
End: 2019-05-10

## 2019-05-10 VITALS
WEIGHT: 161 LBS | DIASTOLIC BLOOD PRESSURE: 57 MMHG | HEIGHT: 71 IN | SYSTOLIC BLOOD PRESSURE: 141 MMHG | TEMPERATURE: 97.7 F | OXYGEN SATURATION: 100 % | BODY MASS INDEX: 22.54 KG/M2 | HEART RATE: 63 BPM

## 2019-05-10 DIAGNOSIS — D53.9 MACROCYTIC ANEMIA: Primary | ICD-10-CM

## 2019-05-10 DIAGNOSIS — D50.9 IRON DEFICIENCY ANEMIA, UNSPECIFIED IRON DEFICIENCY ANEMIA TYPE: ICD-10-CM

## 2019-05-10 DIAGNOSIS — D64.9 ANEMIA, UNSPECIFIED TYPE: ICD-10-CM

## 2019-05-10 DIAGNOSIS — D46.9 MYELODYSPLASTIC SYNDROME (HCC): ICD-10-CM

## 2019-05-10 DIAGNOSIS — D46.9 MDS (MYELODYSPLASTIC SYNDROME) (HCC): Primary | ICD-10-CM

## 2019-05-10 LAB
ANISOCYTOSIS BLD QL: ABNORMAL
BASOPHILS # BLD MANUAL: 0.05 10*3/MM3 (ref 0–0.2)
BASOPHILS NFR BLD AUTO: 1 % (ref 0–2)
C3 FRG RBC-MCNC: ABNORMAL
DEPRECATED RDW RBC AUTO: 98.8 FL (ref 40–54)
ELLIPTOCYTES BLD QL SMEAR: ABNORMAL
EOSINOPHIL # BLD MANUAL: 0.16 10*3/MM3 (ref 0–0.7)
EOSINOPHIL NFR BLD MANUAL: 3 % (ref 0–4)
ERYTHROCYTE [DISTWIDTH] IN BLOOD BY AUTOMATED COUNT: 24.2 % (ref 12–15)
GIANT PLATELETS: ABNORMAL
HCT VFR BLD AUTO: 27.1 % (ref 40–52)
HGB BLD-MCNC: 8.6 G/DL (ref 14–18)
HOLD SPECIMEN: NORMAL
HYPOCHROMIA BLD QL: ABNORMAL
LYMPHOCYTES # BLD MANUAL: 1.2 10*3/MM3 (ref 0.72–4.86)
LYMPHOCYTES NFR BLD MANUAL: 22.2 % (ref 15–45)
LYMPHOCYTES NFR BLD MANUAL: 6.1 % (ref 4–12)
MACROCYTES BLD QL SMEAR: ABNORMAL
MCH RBC QN AUTO: 35.4 PG (ref 28–32)
MCHC RBC AUTO-ENTMCNC: 31.7 G/DL (ref 33–36)
MCV RBC AUTO: 111.5 FL (ref 82–95)
MONOCYTES # BLD AUTO: 0.33 10*3/MM3 (ref 0.19–1.3)
MYELOCYTES NFR BLD MANUAL: 1 % (ref 0–0)
NEUTROPHILS # BLD AUTO: 3.44 10*3/MM3 (ref 1.87–8.4)
NEUTROPHILS NFR BLD MANUAL: 61.6 % (ref 39–78)
NEUTS BAND NFR BLD MANUAL: 2 % (ref 0–10)
NRBC SPEC MANUAL: 1 /100 WBC (ref 0–0.2)
PLATELET # BLD AUTO: 74 10*3/MM3 (ref 130–400)
PMV BLD AUTO: ABNORMAL FL (ref 6–12)
POIKILOCYTOSIS BLD QL SMEAR: ABNORMAL
POLYCHROMASIA BLD QL SMEAR: ABNORMAL
RBC # BLD AUTO: 2.43 10*6/MM3 (ref 4.8–5.9)
SMALL PLATELETS BLD QL SMEAR: ABNORMAL
VARIANT LYMPHS NFR BLD MANUAL: 3 % (ref 0–5)
WBC MORPH BLD: NORMAL
WBC NRBC COR # BLD: 5.4 10*3/MM3 (ref 4.8–10.8)

## 2019-05-10 PROCEDURE — 85007 BL SMEAR W/DIFF WBC COUNT: CPT | Performed by: INTERNAL MEDICINE

## 2019-05-10 PROCEDURE — 25010000002 EPOETIN ALFA PER 1000 UNITS: Performed by: INTERNAL MEDICINE

## 2019-05-10 PROCEDURE — 85025 COMPLETE CBC W/AUTO DIFF WBC: CPT | Performed by: INTERNAL MEDICINE

## 2019-05-10 PROCEDURE — 96372 THER/PROPH/DIAG INJ SC/IM: CPT

## 2019-05-10 PROCEDURE — 36415 COLL VENOUS BLD VENIPUNCTURE: CPT

## 2019-05-10 RX ADMIN — ERYTHROPOIETIN 40000 UNITS: 40000 INJECTION, SOLUTION INTRAVENOUS; SUBCUTANEOUS at 12:38

## 2019-05-17 ENCOUNTER — APPOINTMENT (OUTPATIENT)
Dept: LAB | Facility: HOSPITAL | Age: 79
End: 2019-05-17

## 2019-05-17 ENCOUNTER — INFUSION (OUTPATIENT)
Dept: ONCOLOGY | Facility: HOSPITAL | Age: 79
End: 2019-05-17

## 2019-05-17 VITALS
WEIGHT: 158 LBS | SYSTOLIC BLOOD PRESSURE: 136 MMHG | DIASTOLIC BLOOD PRESSURE: 54 MMHG | OXYGEN SATURATION: 98 % | HEART RATE: 66 BPM | BODY MASS INDEX: 22.04 KG/M2 | TEMPERATURE: 98.1 F

## 2019-05-17 DIAGNOSIS — D46.9 MDS (MYELODYSPLASTIC SYNDROME) (HCC): Primary | ICD-10-CM

## 2019-05-17 LAB
ALBUMIN SERPL-MCNC: 3.6 G/DL (ref 3.5–5)
ALBUMIN/GLOB SERPL: 1.3 G/DL (ref 1.1–2.5)
ALP SERPL-CCNC: 93 U/L (ref 24–120)
ALT SERPL W P-5'-P-CCNC: 15 U/L (ref 0–54)
ANION GAP SERPL CALCULATED.3IONS-SCNC: 7 MMOL/L (ref 4–13)
ANISOCYTOSIS BLD QL: NORMAL
AST SERPL-CCNC: 33 U/L (ref 7–45)
BASO STIPL COARSE BLD QL SMEAR: NORMAL
BILIRUB SERPL-MCNC: 0.9 MG/DL (ref 0.1–1)
BUN BLD-MCNC: 19 MG/DL (ref 5–21)
BUN/CREAT SERPL: 18.3 (ref 7–25)
C3 FRG RBC-MCNC: NORMAL
CALCIUM SPEC-SCNC: 8.6 MG/DL (ref 8.4–10.4)
CHLORIDE SERPL-SCNC: 104 MMOL/L (ref 98–110)
CO2 SERPL-SCNC: 28 MMOL/L (ref 24–31)
CREAT BLD-MCNC: 1.04 MG/DL (ref 0.5–1.4)
DACRYOCYTES BLD QL SMEAR: NORMAL
DEPRECATED RDW RBC AUTO: 98.2 FL (ref 40–54)
EOSINOPHIL # BLD MANUAL: 0.1 10*3/MM3 (ref 0–0.7)
EOSINOPHIL NFR BLD MANUAL: 2 % (ref 0–4)
ERYTHROCYTE [DISTWIDTH] IN BLOOD BY AUTOMATED COUNT: 24.1 % (ref 12–15)
FERRITIN SERPL-MCNC: 619 NG/ML (ref 17.9–464)
GFR SERPL CREATININE-BSD FRML MDRD: 69 ML/MIN/1.73
GIANT PLATELETS: NORMAL
GLOBULIN UR ELPH-MCNC: 2.8 GM/DL
GLUCOSE BLD-MCNC: 89 MG/DL (ref 70–100)
HCT VFR BLD AUTO: 27.3 % (ref 40–52)
HGB BLD-MCNC: 8.5 G/DL (ref 14–18)
HYPOCHROMIA BLD QL: NORMAL
IRON 24H UR-MRATE: 107 MCG/DL (ref 42–180)
IRON SATN MFR SERPL: 48 % (ref 20–45)
LYMPHOCYTES # BLD MANUAL: 0.98 10*3/MM3 (ref 0.72–4.86)
LYMPHOCYTES NFR BLD MANUAL: 20.4 % (ref 15–45)
LYMPHOCYTES NFR BLD MANUAL: 5.1 % (ref 4–12)
MACROCYTES BLD QL SMEAR: NORMAL
MCH RBC QN AUTO: 34.8 PG (ref 28–32)
MCHC RBC AUTO-ENTMCNC: 31.1 G/DL (ref 33–36)
MCV RBC AUTO: 111.9 FL (ref 82–95)
MONOCYTES # BLD AUTO: 0.24 10*3/MM3 (ref 0.19–1.3)
NEUTROPHILS # BLD AUTO: 3.37 10*3/MM3 (ref 1.87–8.4)
NEUTROPHILS NFR BLD MANUAL: 70.4 % (ref 39–78)
OVALOCYTES BLD QL SMEAR: NORMAL
PLATELET # BLD AUTO: 83 10*3/MM3 (ref 130–400)
PMV BLD AUTO: ABNORMAL FL (ref 6–12)
POIKILOCYTOSIS BLD QL SMEAR: NORMAL
POLYCHROMASIA BLD QL SMEAR: NORMAL
POTASSIUM BLD-SCNC: 3.8 MMOL/L (ref 3.5–5.3)
PROT SERPL-MCNC: 6.4 G/DL (ref 6.3–8.7)
RBC # BLD AUTO: 2.44 10*6/MM3 (ref 4.8–5.9)
SMALL PLATELETS BLD QL SMEAR: NORMAL
SODIUM BLD-SCNC: 139 MMOL/L (ref 135–145)
SPHEROCYTES BLD QL SMEAR: NORMAL
TARGETS BLD QL SMEAR: NORMAL
TIBC SERPL-MCNC: 222 MCG/DL (ref 225–420)
VARIANT LYMPHS NFR BLD MANUAL: 2 % (ref 0–5)
WBC MORPH BLD: NORMAL
WBC NRBC COR # BLD: 4.78 10*3/MM3 (ref 4.8–10.8)

## 2019-05-17 PROCEDURE — 85007 BL SMEAR W/DIFF WBC COUNT: CPT | Performed by: INTERNAL MEDICINE

## 2019-05-17 PROCEDURE — 96372 THER/PROPH/DIAG INJ SC/IM: CPT

## 2019-05-17 PROCEDURE — 82728 ASSAY OF FERRITIN: CPT | Performed by: INTERNAL MEDICINE

## 2019-05-17 PROCEDURE — 25010000002 EPOETIN ALFA PER 1000 UNITS: Performed by: INTERNAL MEDICINE

## 2019-05-17 PROCEDURE — 80053 COMPREHEN METABOLIC PANEL: CPT | Performed by: INTERNAL MEDICINE

## 2019-05-17 PROCEDURE — 83540 ASSAY OF IRON: CPT | Performed by: INTERNAL MEDICINE

## 2019-05-17 PROCEDURE — 83550 IRON BINDING TEST: CPT | Performed by: INTERNAL MEDICINE

## 2019-05-17 PROCEDURE — 36415 COLL VENOUS BLD VENIPUNCTURE: CPT | Performed by: INTERNAL MEDICINE

## 2019-05-17 PROCEDURE — 85025 COMPLETE CBC W/AUTO DIFF WBC: CPT | Performed by: INTERNAL MEDICINE

## 2019-05-17 RX ADMIN — ERYTHROPOIETIN 40000 UNITS: 40000 INJECTION, SOLUTION INTRAVENOUS; SUBCUTANEOUS at 12:30

## 2019-05-24 ENCOUNTER — LAB (OUTPATIENT)
Dept: LAB | Facility: HOSPITAL | Age: 79
End: 2019-05-24

## 2019-05-24 ENCOUNTER — INFUSION (OUTPATIENT)
Dept: ONCOLOGY | Facility: HOSPITAL | Age: 79
End: 2019-05-24

## 2019-05-24 VITALS
DIASTOLIC BLOOD PRESSURE: 72 MMHG | HEART RATE: 68 BPM | BODY MASS INDEX: 22.51 KG/M2 | TEMPERATURE: 97.7 F | SYSTOLIC BLOOD PRESSURE: 167 MMHG | OXYGEN SATURATION: 100 % | WEIGHT: 161.4 LBS

## 2019-05-24 DIAGNOSIS — D50.9 IRON DEFICIENCY ANEMIA, UNSPECIFIED IRON DEFICIENCY ANEMIA TYPE: ICD-10-CM

## 2019-05-24 DIAGNOSIS — D46.9 MDS (MYELODYSPLASTIC SYNDROME) (HCC): Primary | ICD-10-CM

## 2019-05-24 DIAGNOSIS — D46.9 MYELODYSPLASTIC SYNDROME (HCC): ICD-10-CM

## 2019-05-24 DIAGNOSIS — D53.9 MACROCYTIC ANEMIA: Primary | ICD-10-CM

## 2019-05-24 LAB
BASOPHILS # BLD AUTO: 0.03 10*3/MM3 (ref 0–0.2)
BASOPHILS NFR BLD AUTO: 0.5 % (ref 0–2)
DEPRECATED RDW RBC AUTO: 101.8 FL (ref 40–54)
EOSINOPHIL # BLD AUTO: 0.22 10*3/MM3 (ref 0–0.7)
EOSINOPHIL NFR BLD AUTO: 3.9 % (ref 0–4)
ERYTHROCYTE [DISTWIDTH] IN BLOOD BY AUTOMATED COUNT: 24.4 % (ref 12–15)
HCT VFR BLD AUTO: 28.1 % (ref 40–52)
HGB BLD-MCNC: 8.7 G/DL (ref 14–18)
HOLD SPECIMEN: NORMAL
LYMPHOCYTES # BLD AUTO: 1.57 10*3/MM3 (ref 0.72–4.86)
LYMPHOCYTES NFR BLD AUTO: 28.2 % (ref 15–45)
MCH RBC QN AUTO: 34.8 PG (ref 28–32)
MCHC RBC AUTO-ENTMCNC: 31 G/DL (ref 33–36)
MCV RBC AUTO: 112.4 FL (ref 82–95)
MONOCYTES # BLD AUTO: 0.55 10*3/MM3 (ref 0.19–1.3)
MONOCYTES NFR BLD AUTO: 9.9 % (ref 4–12)
NEUTROPHILS # BLD AUTO: 3.16 10*3/MM3 (ref 1.87–8.4)
NEUTROPHILS NFR BLD AUTO: 56.8 % (ref 39–78)
PLATELET # BLD AUTO: 117 10*3/MM3 (ref 130–400)
PMV BLD AUTO: ABNORMAL FL (ref 6–12)
RBC # BLD AUTO: 2.5 10*6/MM3 (ref 4.8–5.9)
WBC NRBC COR # BLD: 5.57 10*3/MM3 (ref 4.8–10.8)

## 2019-05-24 PROCEDURE — 96372 THER/PROPH/DIAG INJ SC/IM: CPT

## 2019-05-24 PROCEDURE — 36415 COLL VENOUS BLD VENIPUNCTURE: CPT

## 2019-05-24 PROCEDURE — 25010000002 EPOETIN ALFA PER 1000 UNITS: Performed by: INTERNAL MEDICINE

## 2019-05-24 PROCEDURE — 85025 COMPLETE CBC W/AUTO DIFF WBC: CPT | Performed by: INTERNAL MEDICINE

## 2019-05-24 RX ADMIN — ERYTHROPOIETIN 40000 UNITS: 40000 INJECTION, SOLUTION INTRAVENOUS; SUBCUTANEOUS at 12:08

## 2019-05-30 ENCOUNTER — TRANSCRIBE ORDERS (OUTPATIENT)
Dept: ONCOLOGY | Facility: CLINIC | Age: 79
End: 2019-05-30

## 2019-05-30 DIAGNOSIS — D50.9 IRON DEFICIENCY ANEMIA, UNSPECIFIED IRON DEFICIENCY ANEMIA TYPE: ICD-10-CM

## 2019-05-30 DIAGNOSIS — D64.9 ANEMIA, UNSPECIFIED TYPE: Primary | ICD-10-CM

## 2019-05-31 ENCOUNTER — INFUSION (OUTPATIENT)
Dept: ONCOLOGY | Facility: HOSPITAL | Age: 79
End: 2019-05-31

## 2019-05-31 ENCOUNTER — LAB (OUTPATIENT)
Dept: LAB | Facility: HOSPITAL | Age: 79
End: 2019-05-31

## 2019-05-31 VITALS
SYSTOLIC BLOOD PRESSURE: 150 MMHG | TEMPERATURE: 98.4 F | BODY MASS INDEX: 22.59 KG/M2 | DIASTOLIC BLOOD PRESSURE: 65 MMHG | HEART RATE: 73 BPM | OXYGEN SATURATION: 96 % | WEIGHT: 162 LBS

## 2019-05-31 DIAGNOSIS — D64.9 ANEMIA, UNSPECIFIED TYPE: ICD-10-CM

## 2019-05-31 DIAGNOSIS — D50.9 IRON DEFICIENCY ANEMIA, UNSPECIFIED IRON DEFICIENCY ANEMIA TYPE: ICD-10-CM

## 2019-05-31 DIAGNOSIS — D46.9 MDS (MYELODYSPLASTIC SYNDROME) (HCC): Primary | ICD-10-CM

## 2019-05-31 DIAGNOSIS — D46.9 MYELODYSPLASTIC SYNDROME (HCC): ICD-10-CM

## 2019-05-31 LAB
ALBUMIN SERPL-MCNC: 3.7 G/DL (ref 3.5–5)
ALBUMIN/GLOB SERPL: 1.3 G/DL (ref 1.1–2.5)
ALP SERPL-CCNC: 104 U/L (ref 24–120)
ALT SERPL W P-5'-P-CCNC: 16 U/L (ref 0–54)
ANION GAP SERPL CALCULATED.3IONS-SCNC: 4 MMOL/L (ref 4–13)
AST SERPL-CCNC: 28 U/L (ref 7–45)
BASOPHILS # BLD AUTO: 0.03 10*3/MM3 (ref 0–0.2)
BASOPHILS NFR BLD AUTO: 0.6 % (ref 0–2)
BILIRUB SERPL-MCNC: 0.9 MG/DL (ref 0.1–1)
BUN BLD-MCNC: 21 MG/DL (ref 5–21)
BUN/CREAT SERPL: 17.9 (ref 7–25)
CALCIUM SPEC-SCNC: 8.4 MG/DL (ref 8.4–10.4)
CHLORIDE SERPL-SCNC: 104 MMOL/L (ref 98–110)
CO2 SERPL-SCNC: 29 MMOL/L (ref 24–31)
CREAT BLD-MCNC: 1.17 MG/DL (ref 0.5–1.4)
DEPRECATED RDW RBC AUTO: 102.7 FL (ref 40–54)
EOSINOPHIL # BLD AUTO: 0.15 10*3/MM3 (ref 0–0.7)
EOSINOPHIL NFR BLD AUTO: 2.9 % (ref 0–4)
ERYTHROCYTE [DISTWIDTH] IN BLOOD BY AUTOMATED COUNT: 25.6 % (ref 12–15)
GFR SERPL CREATININE-BSD FRML MDRD: 60 ML/MIN/1.73
GLOBULIN UR ELPH-MCNC: 2.9 GM/DL
GLUCOSE BLD-MCNC: 86 MG/DL (ref 70–100)
HCT VFR BLD AUTO: 27.8 % (ref 40–52)
HGB BLD-MCNC: 8.7 G/DL (ref 14–18)
HOLD SPECIMEN: NORMAL
IMM GRANULOCYTES # BLD AUTO: 0.03 10*3/MM3 (ref 0–0.05)
IMM GRANULOCYTES NFR BLD AUTO: 0.6 % (ref 0–5)
LYMPHOCYTES # BLD AUTO: 1.52 10*3/MM3 (ref 0.72–4.86)
LYMPHOCYTES NFR BLD AUTO: 29.5 % (ref 15–45)
MCH RBC QN AUTO: 35.2 PG (ref 28–32)
MCHC RBC AUTO-ENTMCNC: 31.3 G/DL (ref 33–36)
MCV RBC AUTO: 112.6 FL (ref 82–95)
MONOCYTES # BLD AUTO: 0.38 10*3/MM3 (ref 0.19–1.3)
MONOCYTES NFR BLD AUTO: 7.4 % (ref 4–12)
NEUTROPHILS # BLD AUTO: 3.04 10*3/MM3 (ref 1.87–8.4)
NEUTROPHILS NFR BLD AUTO: 59 % (ref 39–78)
NRBC BLD AUTO-RTO: 0.4 /100 WBC (ref 0–0.2)
PLATELET # BLD AUTO: 120 10*3/MM3 (ref 130–400)
POTASSIUM BLD-SCNC: 4.4 MMOL/L (ref 3.5–5.3)
PROT SERPL-MCNC: 6.6 G/DL (ref 6.3–8.7)
RBC # BLD AUTO: 2.47 10*6/MM3 (ref 4.8–5.9)
SODIUM BLD-SCNC: 137 MMOL/L (ref 135–145)
WBC NRBC COR # BLD: 5.15 10*3/MM3 (ref 4.8–10.8)

## 2019-05-31 PROCEDURE — 80053 COMPREHEN METABOLIC PANEL: CPT | Performed by: INTERNAL MEDICINE

## 2019-05-31 PROCEDURE — 96372 THER/PROPH/DIAG INJ SC/IM: CPT

## 2019-05-31 PROCEDURE — 85025 COMPLETE CBC W/AUTO DIFF WBC: CPT | Performed by: INTERNAL MEDICINE

## 2019-05-31 PROCEDURE — 25010000002 EPOETIN ALFA PER 1000 UNITS: Performed by: INTERNAL MEDICINE

## 2019-05-31 PROCEDURE — 36415 COLL VENOUS BLD VENIPUNCTURE: CPT | Performed by: INTERNAL MEDICINE

## 2019-05-31 RX ADMIN — ERYTHROPOIETIN 40000 UNITS: 40000 INJECTION, SOLUTION INTRAVENOUS; SUBCUTANEOUS at 12:44

## 2019-06-07 ENCOUNTER — INFUSION (OUTPATIENT)
Dept: ONCOLOGY | Facility: HOSPITAL | Age: 79
End: 2019-06-07

## 2019-06-07 ENCOUNTER — LAB (OUTPATIENT)
Dept: LAB | Facility: HOSPITAL | Age: 79
End: 2019-06-07

## 2019-06-07 VITALS
SYSTOLIC BLOOD PRESSURE: 124 MMHG | OXYGEN SATURATION: 97 % | TEMPERATURE: 98.1 F | DIASTOLIC BLOOD PRESSURE: 59 MMHG | HEART RATE: 76 BPM

## 2019-06-07 DIAGNOSIS — D46.9 MDS (MYELODYSPLASTIC SYNDROME) (HCC): Primary | ICD-10-CM

## 2019-06-07 DIAGNOSIS — D64.9 ANEMIA, UNSPECIFIED TYPE: ICD-10-CM

## 2019-06-07 DIAGNOSIS — D50.9 IRON DEFICIENCY ANEMIA, UNSPECIFIED IRON DEFICIENCY ANEMIA TYPE: ICD-10-CM

## 2019-06-07 LAB
BASOPHILS # BLD AUTO: 0.03 10*3/MM3 (ref 0–0.2)
BASOPHILS NFR BLD AUTO: 0.5 % (ref 0–2)
DEPRECATED RDW RBC AUTO: 102.3 FL (ref 40–54)
EOSINOPHIL # BLD AUTO: 0.22 10*3/MM3 (ref 0–0.7)
EOSINOPHIL NFR BLD AUTO: 3.9 % (ref 0–4)
ERYTHROCYTE [DISTWIDTH] IN BLOOD BY AUTOMATED COUNT: 25.6 % (ref 12–15)
HCT VFR BLD AUTO: 27.2 % (ref 40–52)
HGB BLD-MCNC: 8.6 G/DL (ref 14–18)
HOLD SPECIMEN: NORMAL
IMM GRANULOCYTES # BLD AUTO: 0.03 10*3/MM3 (ref 0–0.05)
IMM GRANULOCYTES NFR BLD AUTO: 0.5 % (ref 0–5)
LYMPHOCYTES # BLD AUTO: 1.55 10*3/MM3 (ref 0.72–4.86)
LYMPHOCYTES NFR BLD AUTO: 27.5 % (ref 15–45)
MCH RBC QN AUTO: 35.2 PG (ref 28–32)
MCHC RBC AUTO-ENTMCNC: 31.6 G/DL (ref 33–36)
MCV RBC AUTO: 111.5 FL (ref 82–95)
MONOCYTES # BLD AUTO: 0.57 10*3/MM3 (ref 0.19–1.3)
MONOCYTES NFR BLD AUTO: 10.1 % (ref 4–12)
NEUTROPHILS # BLD AUTO: 3.24 10*3/MM3 (ref 1.87–8.4)
NEUTROPHILS NFR BLD AUTO: 57.5 % (ref 39–78)
NRBC BLD AUTO-RTO: 0 /100 WBC (ref 0–0.2)
PLATELET # BLD AUTO: 81 10*3/MM3 (ref 130–400)
RBC # BLD AUTO: 2.44 10*6/MM3 (ref 4.8–5.9)
WBC NRBC COR # BLD: 5.64 10*3/MM3 (ref 4.8–10.8)

## 2019-06-07 PROCEDURE — 85025 COMPLETE CBC W/AUTO DIFF WBC: CPT

## 2019-06-07 PROCEDURE — 96372 THER/PROPH/DIAG INJ SC/IM: CPT

## 2019-06-07 PROCEDURE — 25010000002 EPOETIN ALFA PER 1000 UNITS: Performed by: INTERNAL MEDICINE

## 2019-06-07 PROCEDURE — 36415 COLL VENOUS BLD VENIPUNCTURE: CPT

## 2019-06-07 RX ADMIN — ERYTHROPOIETIN 40000 UNITS: 40000 INJECTION, SOLUTION INTRAVENOUS; SUBCUTANEOUS at 12:16

## 2019-06-14 ENCOUNTER — LAB (OUTPATIENT)
Dept: LAB | Facility: HOSPITAL | Age: 79
End: 2019-06-14

## 2019-06-14 ENCOUNTER — INFUSION (OUTPATIENT)
Dept: ONCOLOGY | Facility: HOSPITAL | Age: 79
End: 2019-06-14

## 2019-06-14 VITALS
HEART RATE: 66 BPM | OXYGEN SATURATION: 100 % | TEMPERATURE: 98 F | DIASTOLIC BLOOD PRESSURE: 64 MMHG | RESPIRATION RATE: 18 BRPM | SYSTOLIC BLOOD PRESSURE: 147 MMHG

## 2019-06-14 DIAGNOSIS — D64.9 ANEMIA, UNSPECIFIED TYPE: ICD-10-CM

## 2019-06-14 DIAGNOSIS — D50.9 IRON DEFICIENCY ANEMIA, UNSPECIFIED IRON DEFICIENCY ANEMIA TYPE: ICD-10-CM

## 2019-06-14 DIAGNOSIS — D46.9 MDS (MYELODYSPLASTIC SYNDROME) (HCC): Primary | ICD-10-CM

## 2019-06-14 LAB
ANISOCYTOSIS BLD QL: ABNORMAL
DEPRECATED RDW RBC AUTO: 100.9 FL (ref 40–54)
EOSINOPHIL # BLD MANUAL: 0.26 10*3/MM3 (ref 0–0.7)
EOSINOPHIL NFR BLD MANUAL: 5.1 % (ref 0–4)
ERYTHROCYTE [DISTWIDTH] IN BLOOD BY AUTOMATED COUNT: 24.8 % (ref 12–15)
FERRITIN SERPL-MCNC: 692 NG/ML (ref 17.9–464)
GIANT PLATELETS: ABNORMAL
HCT VFR BLD AUTO: 28 % (ref 40–52)
HGB BLD-MCNC: 8.7 G/DL (ref 14–18)
HYPOCHROMIA BLD QL: ABNORMAL
IRON 24H UR-MRATE: 91 MCG/DL (ref 42–180)
IRON SATN MFR SERPL: 41 % (ref 20–45)
LYMPHOCYTES # BLD MANUAL: 1.92 10*3/MM3 (ref 0.72–4.86)
LYMPHOCYTES NFR BLD MANUAL: 37.4 % (ref 15–45)
LYMPHOCYTES NFR BLD MANUAL: 8.1 % (ref 4–12)
MACROCYTES BLD QL SMEAR: ABNORMAL
MCH RBC QN AUTO: 34.5 PG (ref 28–32)
MCHC RBC AUTO-ENTMCNC: 31.1 G/DL (ref 33–36)
MCV RBC AUTO: 111.1 FL (ref 82–95)
MONOCYTES # BLD AUTO: 0.42 10*3/MM3 (ref 0.19–1.3)
NEUTROPHILS # BLD AUTO: 2.54 10*3/MM3 (ref 1.87–8.4)
NEUTROPHILS NFR BLD MANUAL: 45.5 % (ref 39–78)
NEUTS BAND NFR BLD MANUAL: 4 % (ref 0–10)
PLATELET # BLD AUTO: 78 10*3/MM3 (ref 130–400)
POIKILOCYTOSIS BLD QL SMEAR: ABNORMAL
POLYCHROMASIA BLD QL SMEAR: ABNORMAL
RBC # BLD AUTO: 2.52 10*6/MM3 (ref 4.8–5.9)
SMALL PLATELETS BLD QL SMEAR: ABNORMAL
TIBC SERPL-MCNC: 223 MCG/DL (ref 225–420)
WBC MORPH BLD: NORMAL
WBC NRBC COR # BLD: 5.13 10*3/MM3 (ref 4.8–10.8)

## 2019-06-14 PROCEDURE — 85007 BL SMEAR W/DIFF WBC COUNT: CPT | Performed by: INTERNAL MEDICINE

## 2019-06-14 PROCEDURE — 82728 ASSAY OF FERRITIN: CPT | Performed by: INTERNAL MEDICINE

## 2019-06-14 PROCEDURE — 83540 ASSAY OF IRON: CPT | Performed by: INTERNAL MEDICINE

## 2019-06-14 PROCEDURE — 36415 COLL VENOUS BLD VENIPUNCTURE: CPT

## 2019-06-14 PROCEDURE — 25010000002 EPOETIN ALFA PER 1000 UNITS: Performed by: INTERNAL MEDICINE

## 2019-06-14 PROCEDURE — 83550 IRON BINDING TEST: CPT | Performed by: INTERNAL MEDICINE

## 2019-06-14 PROCEDURE — 85025 COMPLETE CBC W/AUTO DIFF WBC: CPT | Performed by: INTERNAL MEDICINE

## 2019-06-14 PROCEDURE — 96372 THER/PROPH/DIAG INJ SC/IM: CPT

## 2019-06-14 RX ADMIN — ERYTHROPOIETIN 40000 UNITS: 40000 INJECTION, SOLUTION INTRAVENOUS; SUBCUTANEOUS at 12:18

## 2019-06-14 NOTE — PROGRESS NOTES
1210 Called Dr. Martinez's office and spoke with Daja ZHANG to report still declining platelet count and other labs. Daja to update doctor no other instructions. Procrit to be given as ordered. Alicia Pope RN

## 2019-06-21 ENCOUNTER — LAB (OUTPATIENT)
Dept: LAB | Facility: HOSPITAL | Age: 79
End: 2019-06-21

## 2019-06-21 ENCOUNTER — INFUSION (OUTPATIENT)
Dept: ONCOLOGY | Facility: HOSPITAL | Age: 79
End: 2019-06-21

## 2019-06-21 VITALS
OXYGEN SATURATION: 98 % | TEMPERATURE: 98.2 F | BODY MASS INDEX: 22.88 KG/M2 | WEIGHT: 163.4 LBS | RESPIRATION RATE: 18 BRPM | HEIGHT: 71 IN | SYSTOLIC BLOOD PRESSURE: 148 MMHG | DIASTOLIC BLOOD PRESSURE: 59 MMHG | HEART RATE: 73 BPM

## 2019-06-21 DIAGNOSIS — D50.9 IRON DEFICIENCY ANEMIA, UNSPECIFIED IRON DEFICIENCY ANEMIA TYPE: ICD-10-CM

## 2019-06-21 DIAGNOSIS — D46.9 MDS (MYELODYSPLASTIC SYNDROME) (HCC): Primary | ICD-10-CM

## 2019-06-21 DIAGNOSIS — D64.9 ANEMIA, UNSPECIFIED TYPE: Primary | ICD-10-CM

## 2019-06-21 LAB
ANISOCYTOSIS BLD QL: ABNORMAL
DEPRECATED RDW RBC AUTO: 99.4 FL (ref 40–54)
EOSINOPHIL # BLD MANUAL: 0.13 10*3/MM3 (ref 0–0.7)
EOSINOPHIL NFR BLD MANUAL: 2 % (ref 0–4)
ERYTHROCYTE [DISTWIDTH] IN BLOOD BY AUTOMATED COUNT: 24.5 % (ref 12–15)
GIANT PLATELETS: ABNORMAL
HCT VFR BLD AUTO: 27.2 % (ref 40–52)
HGB BLD-MCNC: 8.7 G/DL (ref 14–18)
HOLD SPECIMEN: NORMAL
HYPOCHROMIA BLD QL: ABNORMAL
LYMPHOCYTES # BLD MANUAL: 1.24 10*3/MM3 (ref 0.72–4.86)
LYMPHOCYTES NFR BLD MANUAL: 19.2 % (ref 15–45)
LYMPHOCYTES NFR BLD MANUAL: 3 % (ref 4–12)
MACROCYTES BLD QL SMEAR: ABNORMAL
MCH RBC QN AUTO: 35.5 PG (ref 28–32)
MCHC RBC AUTO-ENTMCNC: 32 G/DL (ref 33–36)
MCV RBC AUTO: 111 FL (ref 82–95)
MONOCYTES # BLD AUTO: 0.19 10*3/MM3 (ref 0.19–1.3)
NEUTROPHILS # BLD AUTO: 4.89 10*3/MM3 (ref 1.87–8.4)
NEUTROPHILS NFR BLD MANUAL: 67.7 % (ref 39–78)
NEUTS BAND NFR BLD MANUAL: 8.1 % (ref 0–10)
PLATELET # BLD AUTO: 96 10*3/MM3 (ref 130–400)
PMV BLD AUTO: ABNORMAL FL (ref 6–12)
POIKILOCYTOSIS BLD QL SMEAR: ABNORMAL
POLYCHROMASIA BLD QL SMEAR: ABNORMAL
RBC # BLD AUTO: 2.45 10*6/MM3 (ref 4.8–5.9)
SCHISTOCYTES BLD QL SMEAR: ABNORMAL
SMALL PLATELETS BLD QL SMEAR: ABNORMAL
WBC MORPH BLD: NORMAL
WBC NRBC COR # BLD: 6.45 10*3/MM3 (ref 4.8–10.8)

## 2019-06-21 PROCEDURE — 25010000002 EPOETIN ALFA PER 1000 UNITS: Performed by: INTERNAL MEDICINE

## 2019-06-21 PROCEDURE — 36415 COLL VENOUS BLD VENIPUNCTURE: CPT

## 2019-06-21 PROCEDURE — 85025 COMPLETE CBC W/AUTO DIFF WBC: CPT | Performed by: INTERNAL MEDICINE

## 2019-06-21 PROCEDURE — 96372 THER/PROPH/DIAG INJ SC/IM: CPT

## 2019-06-21 PROCEDURE — 85007 BL SMEAR W/DIFF WBC COUNT: CPT | Performed by: INTERNAL MEDICINE

## 2019-06-21 RX ADMIN — ERYTHROPOIETIN 40000 UNITS: 40000 INJECTION, SOLUTION INTRAVENOUS; SUBCUTANEOUS at 12:43

## 2019-06-28 ENCOUNTER — INFUSION (OUTPATIENT)
Dept: ONCOLOGY | Facility: HOSPITAL | Age: 79
End: 2019-06-28

## 2019-06-28 ENCOUNTER — LAB (OUTPATIENT)
Dept: LAB | Facility: HOSPITAL | Age: 79
End: 2019-06-28

## 2019-06-28 VITALS
HEART RATE: 75 BPM | RESPIRATION RATE: 18 BRPM | DIASTOLIC BLOOD PRESSURE: 58 MMHG | WEIGHT: 160 LBS | HEIGHT: 71 IN | TEMPERATURE: 97.9 F | BODY MASS INDEX: 22.4 KG/M2 | OXYGEN SATURATION: 100 % | SYSTOLIC BLOOD PRESSURE: 128 MMHG

## 2019-06-28 DIAGNOSIS — D53.9 MACROCYTIC ANEMIA: ICD-10-CM

## 2019-06-28 DIAGNOSIS — D46.9 MDS (MYELODYSPLASTIC SYNDROME) (HCC): Primary | ICD-10-CM

## 2019-06-28 DIAGNOSIS — D46.9 MYELODYSPLASTIC SYNDROME (HCC): ICD-10-CM

## 2019-06-28 DIAGNOSIS — D50.9 IRON DEFICIENCY ANEMIA, UNSPECIFIED IRON DEFICIENCY ANEMIA TYPE: ICD-10-CM

## 2019-06-28 LAB
BASOPHILS # BLD AUTO: 0.04 10*3/MM3 (ref 0–0.2)
BASOPHILS NFR BLD AUTO: 0.7 % (ref 0–2)
DEPRECATED RDW RBC AUTO: 97.4 FL (ref 40–54)
EOSINOPHIL # BLD AUTO: 0.26 10*3/MM3 (ref 0–0.7)
EOSINOPHIL NFR BLD AUTO: 4.5 % (ref 0–4)
ERYTHROCYTE [DISTWIDTH] IN BLOOD BY AUTOMATED COUNT: 24.1 % (ref 12–15)
HCT VFR BLD AUTO: 26.4 % (ref 40–52)
HGB BLD-MCNC: 8.3 G/DL (ref 14–18)
HOLD SPECIMEN: NORMAL
LYMPHOCYTES # BLD AUTO: 1.92 10*3/MM3 (ref 0.72–4.86)
LYMPHOCYTES NFR BLD AUTO: 33 % (ref 15–45)
MCH RBC QN AUTO: 34.6 PG (ref 28–32)
MCHC RBC AUTO-ENTMCNC: 31.4 G/DL (ref 33–36)
MCV RBC AUTO: 110 FL (ref 82–95)
MONOCYTES # BLD AUTO: 0.77 10*3/MM3 (ref 0.19–1.3)
MONOCYTES NFR BLD AUTO: 13.3 % (ref 4–12)
NEUTROPHILS # BLD AUTO: 2.78 10*3/MM3 (ref 1.87–8.4)
NEUTROPHILS NFR BLD AUTO: 47.8 % (ref 39–78)
PLATELET # BLD AUTO: 94 10*3/MM3 (ref 130–400)
PMV BLD AUTO: ABNORMAL FL (ref 6–12)
RBC # BLD AUTO: 2.4 10*6/MM3 (ref 4.8–5.9)
WBC NRBC COR # BLD: 5.81 10*3/MM3 (ref 4.8–10.8)

## 2019-06-28 PROCEDURE — 85025 COMPLETE CBC W/AUTO DIFF WBC: CPT | Performed by: INTERNAL MEDICINE

## 2019-06-28 PROCEDURE — 25010000002 EPOETIN ALFA PER 1000 UNITS: Performed by: INTERNAL MEDICINE

## 2019-06-28 PROCEDURE — 36415 COLL VENOUS BLD VENIPUNCTURE: CPT

## 2019-06-28 PROCEDURE — 96372 THER/PROPH/DIAG INJ SC/IM: CPT

## 2019-06-28 RX ADMIN — ERYTHROPOIETIN 40000 UNITS: 40000 INJECTION, SOLUTION INTRAVENOUS; SUBCUTANEOUS at 12:24

## 2019-07-05 ENCOUNTER — LAB (OUTPATIENT)
Dept: LAB | Facility: HOSPITAL | Age: 79
End: 2019-07-05

## 2019-07-05 ENCOUNTER — INFUSION (OUTPATIENT)
Dept: ONCOLOGY | Facility: HOSPITAL | Age: 79
End: 2019-07-05

## 2019-07-05 VITALS
HEIGHT: 71 IN | WEIGHT: 160 LBS | BODY MASS INDEX: 22.4 KG/M2 | HEART RATE: 68 BPM | RESPIRATION RATE: 20 BRPM | DIASTOLIC BLOOD PRESSURE: 67 MMHG | OXYGEN SATURATION: 99 % | TEMPERATURE: 98.1 F | SYSTOLIC BLOOD PRESSURE: 141 MMHG

## 2019-07-05 DIAGNOSIS — D64.9 ANEMIA, UNSPECIFIED TYPE: ICD-10-CM

## 2019-07-05 DIAGNOSIS — D46.9 MDS (MYELODYSPLASTIC SYNDROME) (HCC): Primary | ICD-10-CM

## 2019-07-05 DIAGNOSIS — D50.9 IRON DEFICIENCY ANEMIA, UNSPECIFIED IRON DEFICIENCY ANEMIA TYPE: ICD-10-CM

## 2019-07-05 LAB
BASOPHILS # BLD AUTO: 0.03 10*3/MM3 (ref 0–0.2)
BASOPHILS NFR BLD AUTO: 0.6 % (ref 0–2)
DEPRECATED RDW RBC AUTO: 93.7 FL (ref 40–54)
EOSINOPHIL # BLD AUTO: 0.22 10*3/MM3 (ref 0–0.7)
EOSINOPHIL NFR BLD AUTO: 4.6 % (ref 0–4)
ERYTHROCYTE [DISTWIDTH] IN BLOOD BY AUTOMATED COUNT: 23.4 % (ref 12–15)
FERRITIN SERPL-MCNC: 988 NG/ML (ref 17.9–464)
HCT VFR BLD AUTO: 27.1 % (ref 40–52)
HGB BLD-MCNC: 8.4 G/DL (ref 14–18)
IMM GRANULOCYTES # BLD AUTO: 0.03 10*3/MM3 (ref 0–0.05)
IMM GRANULOCYTES NFR BLD AUTO: 0.6 % (ref 0–5)
IRON 24H UR-MRATE: 19 MCG/DL (ref 42–180)
IRON SATN MFR SERPL: 10 % (ref 20–45)
LYMPHOCYTES # BLD AUTO: 1.01 10*3/MM3 (ref 0.72–4.86)
LYMPHOCYTES NFR BLD AUTO: 21 % (ref 15–45)
MCH RBC QN AUTO: 34.3 PG (ref 28–32)
MCHC RBC AUTO-ENTMCNC: 31 G/DL (ref 33–36)
MCV RBC AUTO: 110.6 FL (ref 82–95)
MONOCYTES # BLD AUTO: 0.68 10*3/MM3 (ref 0.19–1.3)
MONOCYTES NFR BLD AUTO: 14.2 % (ref 4–12)
NEUTROPHILS # BLD AUTO: 2.83 10*3/MM3 (ref 1.87–8.4)
NEUTROPHILS NFR BLD AUTO: 59 % (ref 39–78)
NRBC BLD AUTO-RTO: 0.4 /100 WBC (ref 0–0.2)
PLATELET # BLD AUTO: 124 10*3/MM3 (ref 130–400)
PMV BLD AUTO: 12.4 FL (ref 6–12)
RBC # BLD AUTO: 2.45 10*6/MM3 (ref 4.8–5.9)
TIBC SERPL-MCNC: 200 MCG/DL (ref 225–420)
WBC NRBC COR # BLD: 4.8 10*3/MM3 (ref 4.8–10.8)

## 2019-07-05 PROCEDURE — 96372 THER/PROPH/DIAG INJ SC/IM: CPT

## 2019-07-05 PROCEDURE — 83550 IRON BINDING TEST: CPT

## 2019-07-05 PROCEDURE — 83540 ASSAY OF IRON: CPT

## 2019-07-05 PROCEDURE — 25010000002 EPOETIN ALFA PER 1000 UNITS: Performed by: INTERNAL MEDICINE

## 2019-07-05 PROCEDURE — 36415 COLL VENOUS BLD VENIPUNCTURE: CPT

## 2019-07-05 PROCEDURE — 85025 COMPLETE CBC W/AUTO DIFF WBC: CPT

## 2019-07-05 PROCEDURE — 82728 ASSAY OF FERRITIN: CPT

## 2019-07-05 RX ADMIN — ERYTHROPOIETIN 40000 UNITS: 40000 INJECTION, SOLUTION INTRAVENOUS; SUBCUTANEOUS at 12:46

## 2019-07-11 PROBLEM — D53.9 ANEMIA, MACROCYTIC: Status: ACTIVE | Noted: 2019-07-11

## 2019-07-11 RX ORDER — ACETAMINOPHEN 500 MG
500 TABLET ORAL ONCE
Status: CANCELLED
Start: 2019-07-12 | End: 2019-07-12

## 2019-07-11 RX ORDER — SODIUM CHLORIDE 9 MG/ML
250 INJECTION, SOLUTION INTRAVENOUS ONCE
Status: CANCELLED | OUTPATIENT
Start: 2019-07-12

## 2019-07-11 RX ORDER — METHYLPREDNISOLONE SODIUM SUCCINATE 125 MG/2ML
125 INJECTION, POWDER, LYOPHILIZED, FOR SOLUTION INTRAMUSCULAR; INTRAVENOUS AS NEEDED
Status: CANCELLED
Start: 2019-07-12

## 2019-07-11 RX ORDER — DIPHENHYDRAMINE HYDROCHLORIDE 50 MG/ML
50 INJECTION INTRAMUSCULAR; INTRAVENOUS AS NEEDED
Status: CANCELLED
Start: 2019-07-12

## 2019-07-12 ENCOUNTER — LAB (OUTPATIENT)
Dept: LAB | Facility: HOSPITAL | Age: 79
End: 2019-07-12

## 2019-07-12 ENCOUNTER — INFUSION (OUTPATIENT)
Dept: ONCOLOGY | Facility: HOSPITAL | Age: 79
End: 2019-07-12

## 2019-07-12 VITALS
TEMPERATURE: 97.4 F | HEART RATE: 70 BPM | RESPIRATION RATE: 18 BRPM | SYSTOLIC BLOOD PRESSURE: 128 MMHG | BODY MASS INDEX: 22.12 KG/M2 | OXYGEN SATURATION: 100 % | WEIGHT: 158 LBS | HEIGHT: 71 IN | DIASTOLIC BLOOD PRESSURE: 66 MMHG

## 2019-07-12 DIAGNOSIS — D50.9 IRON DEFICIENCY ANEMIA, UNSPECIFIED IRON DEFICIENCY ANEMIA TYPE: ICD-10-CM

## 2019-07-12 DIAGNOSIS — D64.9 ANEMIA, UNSPECIFIED TYPE: ICD-10-CM

## 2019-07-12 DIAGNOSIS — D53.9 ANEMIA, MACROCYTIC: Primary | ICD-10-CM

## 2019-07-12 LAB
BASOPHILS # BLD AUTO: 0.02 10*3/MM3 (ref 0–0.2)
BASOPHILS NFR BLD AUTO: 0.4 % (ref 0–2)
DEPRECATED RDW RBC AUTO: 90.1 FL (ref 40–54)
EOSINOPHIL # BLD AUTO: 0.17 10*3/MM3 (ref 0–0.7)
EOSINOPHIL NFR BLD AUTO: 3.4 % (ref 0–4)
ERYTHROCYTE [DISTWIDTH] IN BLOOD BY AUTOMATED COUNT: 22.6 % (ref 12–15)
HCT VFR BLD AUTO: 26.7 % (ref 40–52)
HGB BLD-MCNC: 8.4 G/DL (ref 14–18)
HOLD SPECIMEN: NORMAL
LYMPHOCYTES # BLD AUTO: 1.2 10*3/MM3 (ref 0.72–4.86)
LYMPHOCYTES NFR BLD AUTO: 24 % (ref 15–45)
MCH RBC QN AUTO: 34.6 PG (ref 28–32)
MCHC RBC AUTO-ENTMCNC: 31.5 G/DL (ref 33–36)
MCV RBC AUTO: 109.9 FL (ref 82–95)
MONOCYTES # BLD AUTO: 0.61 10*3/MM3 (ref 0.19–1.3)
MONOCYTES NFR BLD AUTO: 12.2 % (ref 4–12)
NEUTROPHILS # BLD AUTO: 2.95 10*3/MM3 (ref 1.87–8.4)
NEUTROPHILS NFR BLD AUTO: 58.8 % (ref 39–78)
PLATELET # BLD AUTO: 147 10*3/MM3 (ref 130–400)
PMV BLD AUTO: 13 FL (ref 6–12)
RBC # BLD AUTO: 2.43 10*6/MM3 (ref 4.8–5.9)
WBC NRBC COR # BLD: 5.01 10*3/MM3 (ref 4.8–10.8)

## 2019-07-12 PROCEDURE — 96365 THER/PROPH/DIAG IV INF INIT: CPT

## 2019-07-12 PROCEDURE — 36415 COLL VENOUS BLD VENIPUNCTURE: CPT

## 2019-07-12 PROCEDURE — 25010000002 FERRIC CARBOXYMALTOSE 750 MG/15ML SOLUTION 15 ML VIAL: Performed by: INTERNAL MEDICINE

## 2019-07-12 PROCEDURE — 96367 TX/PROPH/DG ADDL SEQ IV INF: CPT

## 2019-07-12 PROCEDURE — 25010000002 DIPHENHYDRAMINE PER 50 MG: Performed by: INTERNAL MEDICINE

## 2019-07-12 PROCEDURE — 85025 COMPLETE CBC W/AUTO DIFF WBC: CPT

## 2019-07-12 RX ORDER — SODIUM CHLORIDE 9 MG/ML
250 INJECTION, SOLUTION INTRAVENOUS ONCE
Status: COMPLETED | OUTPATIENT
Start: 2019-07-12 | End: 2019-07-12

## 2019-07-12 RX ORDER — ACETAMINOPHEN 500 MG
500 TABLET ORAL ONCE
Status: COMPLETED | OUTPATIENT
Start: 2019-07-12 | End: 2019-07-12

## 2019-07-12 RX ORDER — DIPHENHYDRAMINE HYDROCHLORIDE 50 MG/ML
50 INJECTION INTRAMUSCULAR; INTRAVENOUS AS NEEDED
Status: DISCONTINUED | OUTPATIENT
Start: 2019-07-12 | End: 2019-07-12 | Stop reason: HOSPADM

## 2019-07-12 RX ORDER — METHYLPREDNISOLONE SODIUM SUCCINATE 125 MG/2ML
125 INJECTION, POWDER, LYOPHILIZED, FOR SOLUTION INTRAMUSCULAR; INTRAVENOUS AS NEEDED
Status: DISCONTINUED | OUTPATIENT
Start: 2019-07-12 | End: 2019-07-12 | Stop reason: HOSPADM

## 2019-07-12 RX ADMIN — FERRIC CARBOXYMALTOSE INJECTION 750 MG: 50 INJECTION, SOLUTION INTRAVENOUS at 13:08

## 2019-07-12 RX ADMIN — ACETAMINOPHEN 500 MG: 500 TABLET, FILM COATED ORAL at 12:33

## 2019-07-12 RX ADMIN — SODIUM CHLORIDE 250 ML: 9 INJECTION, SOLUTION INTRAVENOUS at 12:50

## 2019-07-12 RX ADMIN — DIPHENHYDRAMINE HYDROCHLORIDE 25 MG: 50 INJECTION, SOLUTION INTRAMUSCULAR; INTRAVENOUS at 12:50

## 2019-07-19 ENCOUNTER — LAB (OUTPATIENT)
Dept: LAB | Facility: HOSPITAL | Age: 79
End: 2019-07-19

## 2019-07-19 DIAGNOSIS — D50.9 IRON DEFICIENCY ANEMIA, UNSPECIFIED IRON DEFICIENCY ANEMIA TYPE: ICD-10-CM

## 2019-07-19 DIAGNOSIS — D64.9 ANEMIA, UNSPECIFIED TYPE: ICD-10-CM

## 2019-07-19 LAB
BASOPHILS # BLD AUTO: 0.03 10*3/MM3 (ref 0–0.2)
BASOPHILS NFR BLD AUTO: 0.6 % (ref 0–2)
DEPRECATED RDW RBC AUTO: 88.8 FL (ref 40–54)
EOSINOPHIL # BLD AUTO: 0.19 10*3/MM3 (ref 0–0.7)
EOSINOPHIL NFR BLD AUTO: 3.7 % (ref 0–4)
ERYTHROCYTE [DISTWIDTH] IN BLOOD BY AUTOMATED COUNT: 22.8 % (ref 12–15)
HCT VFR BLD AUTO: 24.3 % (ref 40–52)
HGB BLD-MCNC: 7.6 G/DL (ref 14–18)
HOLD SPECIMEN: NORMAL
LYMPHOCYTES # BLD AUTO: 0.93 10*3/MM3 (ref 0.72–4.86)
LYMPHOCYTES NFR BLD AUTO: 18.1 % (ref 15–45)
MCH RBC QN AUTO: 34.1 PG (ref 28–32)
MCHC RBC AUTO-ENTMCNC: 31.3 G/DL (ref 33–36)
MCV RBC AUTO: 109 FL (ref 82–95)
MONOCYTES # BLD AUTO: 0.68 10*3/MM3 (ref 0.19–1.3)
MONOCYTES NFR BLD AUTO: 13.3 % (ref 4–12)
NEUTROPHILS # BLD AUTO: 3.26 10*3/MM3 (ref 1.87–8.4)
NEUTROPHILS NFR BLD AUTO: 63.5 % (ref 39–78)
PLATELET # BLD AUTO: 133 10*3/MM3 (ref 130–400)
PMV BLD AUTO: 13.6 FL (ref 6–12)
RBC # BLD AUTO: 2.23 10*6/MM3 (ref 4.8–5.9)
WBC NRBC COR # BLD: 5.13 10*3/MM3 (ref 4.8–10.8)

## 2019-07-19 PROCEDURE — 36415 COLL VENOUS BLD VENIPUNCTURE: CPT

## 2019-07-19 PROCEDURE — 85025 COMPLETE CBC W/AUTO DIFF WBC: CPT | Performed by: INTERNAL MEDICINE

## 2019-07-25 ENCOUNTER — HOSPITAL ENCOUNTER (INPATIENT)
Facility: HOSPITAL | Age: 79
LOS: 3 days | Discharge: HOME | End: 2019-07-29
Attending: EMERGENCY MEDICINE | Admitting: INTERNAL MEDICINE

## 2019-07-25 ENCOUNTER — APPOINTMENT (OUTPATIENT)
Dept: GENERAL RADIOLOGY | Facility: HOSPITAL | Age: 79
End: 2019-07-25

## 2019-07-25 ENCOUNTER — APPOINTMENT (OUTPATIENT)
Dept: CT IMAGING | Facility: HOSPITAL | Age: 79
End: 2019-07-25

## 2019-07-25 DIAGNOSIS — D64.9 ANEMIA, UNSPECIFIED TYPE: ICD-10-CM

## 2019-07-25 DIAGNOSIS — J18.9 PNEUMONIA OF RIGHT LOWER LOBE DUE TO INFECTIOUS ORGANISM: Primary | ICD-10-CM

## 2019-07-25 DIAGNOSIS — R74.8 ELEVATED LIVER ENZYMES: ICD-10-CM

## 2019-07-25 DIAGNOSIS — K80.20 CALCULUS OF GALLBLADDER WITHOUT CHOLECYSTITIS WITHOUT OBSTRUCTION: ICD-10-CM

## 2019-07-25 LAB
ALBUMIN SERPL-MCNC: 3.3 G/DL (ref 3.5–5)
ALBUMIN/GLOB SERPL: 0.9 G/DL (ref 1.1–2.5)
ALP SERPL-CCNC: 325 U/L (ref 24–120)
ALT SERPL W P-5'-P-CCNC: 85 U/L (ref 0–54)
ANION GAP SERPL CALCULATED.3IONS-SCNC: 8 MMOL/L (ref 4–13)
APAP SERPL-MCNC: <10 MCG/ML (ref 10–30)
APTT PPP: 39.3 SECONDS (ref 24.1–35)
AST SERPL-CCNC: 105 U/L (ref 7–45)
ATMOSPHERIC PRESS: 755 MMHG
BASE EXCESS BLDV CALC-SCNC: 1.9 MMOL/L (ref 0–2)
BASOPHILS # BLD AUTO: 0.02 10*3/MM3 (ref 0–0.2)
BASOPHILS NFR BLD AUTO: 0.3 % (ref 0–2)
BDY SITE: ABNORMAL
BILIRUB SERPL-MCNC: 1.3 MG/DL (ref 0.1–1)
BODY TEMPERATURE: 37 C
BUN BLD-MCNC: 22 MG/DL (ref 5–21)
BUN/CREAT SERPL: 23.7 (ref 7–25)
CALCIUM SPEC-SCNC: 8.7 MG/DL (ref 8.4–10.4)
CHLORIDE SERPL-SCNC: 101 MMOL/L (ref 98–110)
CK SERPL-CCNC: 58 U/L (ref 0–203)
CO2 SERPL-SCNC: 26 MMOL/L (ref 24–31)
CREAT BLD-MCNC: 0.93 MG/DL (ref 0.5–1.4)
D-LACTATE SERPL-SCNC: 3.6 MMOL/L (ref 0.5–2)
DEPRECATED RDW RBC AUTO: 88.2 FL (ref 40–54)
EOSINOPHIL # BLD AUTO: 0.19 10*3/MM3 (ref 0–0.7)
EOSINOPHIL NFR BLD AUTO: 3.1 % (ref 0–4)
ERYTHROCYTE [DISTWIDTH] IN BLOOD BY AUTOMATED COUNT: 23.1 % (ref 12–15)
ETHANOL UR QL: <0.01 %
GFR SERPL CREATININE-BSD FRML MDRD: 78 ML/MIN/1.73
GLOBULIN UR ELPH-MCNC: 3.7 GM/DL
GLUCOSE BLD-MCNC: 112 MG/DL (ref 70–100)
HCO3 BLDV-SCNC: 27.1 MMOL/L (ref 22–28)
HCT VFR BLD AUTO: 21.4 % (ref 40–52)
HGB BLD-MCNC: 6.8 G/DL (ref 14–18)
HOLD SPECIMEN: NORMAL
INR PPP: 1.27 (ref 0.91–1.09)
IRON 24H UR-MRATE: 56 MCG/DL (ref 42–180)
IRON SATN MFR SERPL: 30 % (ref 20–45)
LYMPHOCYTES # BLD AUTO: 0.73 10*3/MM3 (ref 0.72–4.86)
LYMPHOCYTES NFR BLD AUTO: 12.1 % (ref 15–45)
Lab: ABNORMAL
MCH RBC QN AUTO: 34 PG (ref 28–32)
MCHC RBC AUTO-ENTMCNC: 31.8 G/DL (ref 33–36)
MCV RBC AUTO: 107 FL (ref 82–95)
MODALITY: ABNORMAL
MONOCYTES # BLD AUTO: 0.64 10*3/MM3 (ref 0.19–1.3)
MONOCYTES NFR BLD AUTO: 10.6 % (ref 4–12)
NEUTROPHILS # BLD AUTO: 4.41 10*3/MM3 (ref 1.87–8.4)
NEUTROPHILS NFR BLD AUTO: 72.9 % (ref 39–78)
NT-PROBNP SERPL-MCNC: 935 PG/ML (ref 0–1800)
PCO2 BLDV: 44.8 MM HG (ref 41–51)
PH BLDV: 7.39 PH UNITS (ref 7.32–7.42)
PLATELET # BLD AUTO: 169 10*3/MM3 (ref 130–400)
PMV BLD AUTO: 13.5 FL (ref 6–12)
PO2 BLDV: <16 MM HG (ref 27–53)
POTASSIUM BLD-SCNC: 4.7 MMOL/L (ref 3.5–5.3)
PROCALCITONIN SERPL-MCNC: <0.25 NG/ML
PROT SERPL-MCNC: 7 G/DL (ref 6.3–8.7)
PROTHROMBIN TIME: 16.3 SECONDS (ref 11.9–14.6)
RBC # BLD AUTO: 2 10*6/MM3 (ref 4.8–5.9)
SAO2 % BLDCOV: 14.1 % (ref 45–75)
SODIUM BLD-SCNC: 135 MMOL/L (ref 135–145)
TIBC SERPL-MCNC: 186 MCG/DL (ref 225–420)
TROPONIN I SERPL-MCNC: <0.012 NG/ML (ref 0–0.03)
VENTILATOR MODE: ABNORMAL
WBC NRBC COR # BLD: 6.05 10*3/MM3 (ref 4.8–10.8)
WHOLE BLOOD HOLD SPECIMEN: NORMAL

## 2019-07-25 PROCEDURE — 86901 BLOOD TYPING SEROLOGIC RH(D): CPT | Performed by: EMERGENCY MEDICINE

## 2019-07-25 PROCEDURE — 85730 THROMBOPLASTIN TIME PARTIAL: CPT | Performed by: EMERGENCY MEDICINE

## 2019-07-25 PROCEDURE — 80307 DRUG TEST PRSMV CHEM ANLYZR: CPT | Performed by: EMERGENCY MEDICINE

## 2019-07-25 PROCEDURE — 82803 BLOOD GASES ANY COMBINATION: CPT

## 2019-07-25 PROCEDURE — 82607 VITAMIN B-12: CPT | Performed by: NURSE PRACTITIONER

## 2019-07-25 PROCEDURE — 25010000002 CEFTRIAXONE PER 250 MG: Performed by: EMERGENCY MEDICINE

## 2019-07-25 PROCEDURE — 82746 ASSAY OF FOLIC ACID SERUM: CPT | Performed by: NURSE PRACTITIONER

## 2019-07-25 PROCEDURE — 83540 ASSAY OF IRON: CPT | Performed by: EMERGENCY MEDICINE

## 2019-07-25 PROCEDURE — 86900 BLOOD TYPING SEROLOGIC ABO: CPT | Performed by: EMERGENCY MEDICINE

## 2019-07-25 PROCEDURE — 80053 COMPREHEN METABOLIC PANEL: CPT | Performed by: EMERGENCY MEDICINE

## 2019-07-25 PROCEDURE — 86850 RBC ANTIBODY SCREEN: CPT | Performed by: EMERGENCY MEDICINE

## 2019-07-25 PROCEDURE — 74177 CT ABD & PELVIS W/CONTRAST: CPT

## 2019-07-25 PROCEDURE — 83880 ASSAY OF NATRIURETIC PEPTIDE: CPT | Performed by: EMERGENCY MEDICINE

## 2019-07-25 PROCEDURE — 85610 PROTHROMBIN TIME: CPT | Performed by: EMERGENCY MEDICINE

## 2019-07-25 PROCEDURE — 99284 EMERGENCY DEPT VISIT MOD MDM: CPT

## 2019-07-25 PROCEDURE — 93010 ELECTROCARDIOGRAM REPORT: CPT | Performed by: INTERNAL MEDICINE

## 2019-07-25 PROCEDURE — 82550 ASSAY OF CK (CPK): CPT | Performed by: EMERGENCY MEDICINE

## 2019-07-25 PROCEDURE — 83550 IRON BINDING TEST: CPT | Performed by: EMERGENCY MEDICINE

## 2019-07-25 PROCEDURE — 25010000002 AZITHROMYCIN PER 500 MG: Performed by: EMERGENCY MEDICINE

## 2019-07-25 PROCEDURE — 85025 COMPLETE CBC W/AUTO DIFF WBC: CPT | Performed by: EMERGENCY MEDICINE

## 2019-07-25 PROCEDURE — 84145 PROCALCITONIN (PCT): CPT | Performed by: EMERGENCY MEDICINE

## 2019-07-25 PROCEDURE — 25010000002 IOPAMIDOL 61 % SOLUTION: Performed by: EMERGENCY MEDICINE

## 2019-07-25 PROCEDURE — 93005 ELECTROCARDIOGRAM TRACING: CPT | Performed by: EMERGENCY MEDICINE

## 2019-07-25 PROCEDURE — 84484 ASSAY OF TROPONIN QUANT: CPT | Performed by: EMERGENCY MEDICINE

## 2019-07-25 PROCEDURE — 87040 BLOOD CULTURE FOR BACTERIA: CPT | Performed by: EMERGENCY MEDICINE

## 2019-07-25 PROCEDURE — 83605 ASSAY OF LACTIC ACID: CPT | Performed by: EMERGENCY MEDICINE

## 2019-07-25 PROCEDURE — 71045 X-RAY EXAM CHEST 1 VIEW: CPT

## 2019-07-25 RX ORDER — ONDANSETRON 2 MG/ML
4 INJECTION INTRAMUSCULAR; INTRAVENOUS ONCE
Status: DISCONTINUED | OUTPATIENT
Start: 2019-07-25 | End: 2019-07-29 | Stop reason: SDDI

## 2019-07-25 RX ORDER — ACETAMINOPHEN 500 MG
1000 TABLET ORAL ONCE
Status: COMPLETED | OUTPATIENT
Start: 2019-07-25 | End: 2019-07-25

## 2019-07-25 RX ADMIN — AZITHROMYCIN MONOHYDRATE 500 MG: 500 INJECTION, POWDER, LYOPHILIZED, FOR SOLUTION INTRAVENOUS at 22:00

## 2019-07-25 RX ADMIN — ACETAMINOPHEN 1000 MG: 500 TABLET, FILM COATED ORAL at 22:00

## 2019-07-25 RX ADMIN — SODIUM CHLORIDE 500 ML: 0.9 INJECTION, SOLUTION INTRAVENOUS at 22:47

## 2019-07-25 RX ADMIN — IOPAMIDOL 100 ML: 612 INJECTION, SOLUTION INTRAVENOUS at 23:25

## 2019-07-25 RX ADMIN — SODIUM CHLORIDE 500 ML: 9 INJECTION, SOLUTION INTRAVENOUS at 22:01

## 2019-07-25 RX ADMIN — CEFTRIAXONE SODIUM 2 G: 2 INJECTION, POWDER, FOR SOLUTION INTRAMUSCULAR; INTRAVENOUS at 22:01

## 2019-07-26 ENCOUNTER — APPOINTMENT (OUTPATIENT)
Dept: LAB | Facility: HOSPITAL | Age: 79
End: 2019-07-26

## 2019-07-26 ENCOUNTER — APPOINTMENT (OUTPATIENT)
Dept: CT IMAGING | Facility: HOSPITAL | Age: 79
End: 2019-07-26

## 2019-07-26 ENCOUNTER — APPOINTMENT (OUTPATIENT)
Dept: ONCOLOGY | Facility: HOSPITAL | Age: 79
End: 2019-07-26

## 2019-07-26 PROBLEM — D62 ACUTE BLOOD LOSS ANEMIA: Chronic | Status: ACTIVE | Noted: 2019-07-26

## 2019-07-26 PROBLEM — J18.9 PNEUMONIA OF RIGHT LOWER LOBE DUE TO INFECTIOUS ORGANISM: Status: ACTIVE | Noted: 2019-07-26

## 2019-07-26 PROBLEM — R06.02 SHORTNESS OF BREATH: Status: ACTIVE | Noted: 2019-07-26

## 2019-07-26 LAB
ABO GROUP BLD: NORMAL
ALBUMIN SERPL-MCNC: 2.7 G/DL (ref 3.5–5)
ALBUMIN/GLOB SERPL: 0.9 G/DL (ref 1.1–2.5)
ALP SERPL-CCNC: 288 U/L (ref 24–120)
ALT SERPL W P-5'-P-CCNC: 80 U/L (ref 0–54)
ANION GAP SERPL CALCULATED.3IONS-SCNC: 6 MMOL/L (ref 4–13)
AST SERPL-CCNC: 73 U/L (ref 7–45)
BACTERIA UR QL AUTO: ABNORMAL /HPF
BASOPHILS # BLD AUTO: 0.03 10*3/MM3 (ref 0–0.2)
BASOPHILS NFR BLD AUTO: 0.6 % (ref 0–2)
BILIRUB SERPL-MCNC: 0.8 MG/DL (ref 0.1–1)
BILIRUB UR QL STRIP: NEGATIVE
BLD GP AB SCN SERPL QL: NEGATIVE
BUN BLD-MCNC: 20 MG/DL (ref 5–21)
BUN/CREAT SERPL: 19.6 (ref 7–25)
CALCIUM SPEC-SCNC: 8.2 MG/DL (ref 8.4–10.4)
CHLORIDE SERPL-SCNC: 105 MMOL/L (ref 98–110)
CLARITY UR: CLEAR
CO2 SERPL-SCNC: 26 MMOL/L (ref 24–31)
COLOR UR: ABNORMAL
CREAT BLD-MCNC: 1.02 MG/DL (ref 0.5–1.4)
D-LACTATE SERPL-SCNC: 1 MMOL/L (ref 0.5–2)
DEPRECATED RDW RBC AUTO: 83.2 FL (ref 40–54)
DEPRECATED RDW RBC AUTO: 87.6 FL (ref 40–54)
EOSINOPHIL # BLD AUTO: 0.12 10*3/MM3 (ref 0–0.7)
EOSINOPHIL NFR BLD AUTO: 2.6 % (ref 0–4)
ERYTHROCYTE [DISTWIDTH] IN BLOOD BY AUTOMATED COUNT: 23.5 % (ref 12–15)
ERYTHROCYTE [DISTWIDTH] IN BLOOD BY AUTOMATED COUNT: 24.1 % (ref 12–15)
FOLATE SERPL-MCNC: 10 NG/ML (ref 4.78–24.2)
GFR SERPL CREATININE-BSD FRML MDRD: 70 ML/MIN/1.73
GLOBULIN UR ELPH-MCNC: 3.1 GM/DL
GLUCOSE BLD-MCNC: 98 MG/DL (ref 70–100)
GLUCOSE UR STRIP-MCNC: NEGATIVE MG/DL
HCT VFR BLD AUTO: 23.5 % (ref 40–52)
HCT VFR BLD AUTO: 28 % (ref 40–52)
HGB BLD-MCNC: 7.3 G/DL (ref 14–18)
HGB BLD-MCNC: 9.1 G/DL (ref 14–18)
HGB UR QL STRIP.AUTO: ABNORMAL
HOLD SPECIMEN: NORMAL
HYALINE CASTS UR QL AUTO: ABNORMAL /LPF
IMM GRANULOCYTES # BLD AUTO: 0.03 10*3/MM3 (ref 0–0.05)
IMM GRANULOCYTES NFR BLD AUTO: 0.6 % (ref 0–5)
KETONES UR QL STRIP: NEGATIVE
L PNEUMO1 AG UR QL IA: NEGATIVE
LEUKOCYTE ESTERASE UR QL STRIP.AUTO: ABNORMAL
LYMPHOCYTES # BLD AUTO: 1.06 10*3/MM3 (ref 0.72–4.86)
LYMPHOCYTES NFR BLD AUTO: 22.9 % (ref 15–45)
MCH RBC QN AUTO: 32.2 PG (ref 28–32)
MCH RBC QN AUTO: 32.9 PG (ref 28–32)
MCHC RBC AUTO-ENTMCNC: 31.1 G/DL (ref 33–36)
MCHC RBC AUTO-ENTMCNC: 32.5 G/DL (ref 33–36)
MCV RBC AUTO: 101.1 FL (ref 82–95)
MCV RBC AUTO: 103.5 FL (ref 82–95)
MONOCYTES # BLD AUTO: 0.58 10*3/MM3 (ref 0.19–1.3)
MONOCYTES NFR BLD AUTO: 12.5 % (ref 4–12)
NEUTROPHILS # BLD AUTO: 2.81 10*3/MM3 (ref 1.87–8.4)
NEUTROPHILS NFR BLD AUTO: 60.8 % (ref 39–78)
NITRITE UR QL STRIP: NEGATIVE
NRBC BLD AUTO-RTO: 0.4 /100 WBC (ref 0–0.2)
PH UR STRIP.AUTO: <=5 [PH] (ref 5–8)
PLATELET # BLD AUTO: 136 10*3/MM3 (ref 130–400)
PLATELET # BLD AUTO: 153 10*3/MM3 (ref 130–400)
PMV BLD AUTO: 12.5 FL (ref 6–12)
PMV BLD AUTO: 13.3 FL (ref 6–12)
POTASSIUM BLD-SCNC: 4.2 MMOL/L (ref 3.5–5.3)
PROT SERPL-MCNC: 5.8 G/DL (ref 6.3–8.7)
PROT UR QL STRIP: ABNORMAL
RBC # BLD AUTO: 2.27 10*6/MM3 (ref 4.8–5.9)
RBC # BLD AUTO: 2.77 10*6/MM3 (ref 4.8–5.9)
RBC # UR: ABNORMAL /HPF
REF LAB TEST METHOD: ABNORMAL
RH BLD: POSITIVE
S PNEUM AG SPEC QL LA: NEGATIVE
SODIUM BLD-SCNC: 137 MMOL/L (ref 135–145)
SP GR UR STRIP: >1.03 (ref 1–1.03)
SQUAMOUS #/AREA URNS HPF: ABNORMAL /HPF
T&S EXPIRATION DATE: NORMAL
UROBILINOGEN UR QL STRIP: ABNORMAL
VIT B12 BLD-MCNC: 345 PG/ML (ref 239–931)
WBC NRBC COR # BLD: 4.63 10*3/MM3 (ref 4.8–10.8)
WBC NRBC COR # BLD: 6.01 10*3/MM3 (ref 4.8–10.8)
WBC UR QL AUTO: ABNORMAL /HPF

## 2019-07-26 PROCEDURE — 87899 AGENT NOS ASSAY W/OPTIC: CPT | Performed by: INTERNAL MEDICINE

## 2019-07-26 PROCEDURE — 80053 COMPREHEN METABOLIC PANEL: CPT | Performed by: INTERNAL MEDICINE

## 2019-07-26 PROCEDURE — 85027 COMPLETE CBC AUTOMATED: CPT | Performed by: NURSE PRACTITIONER

## 2019-07-26 PROCEDURE — 94640 AIRWAY INHALATION TREATMENT: CPT

## 2019-07-26 PROCEDURE — 71260 CT THORAX DX C+: CPT

## 2019-07-26 PROCEDURE — 0100U HC BIOFIRE FILMARRAY RESP PANEL 2: CPT | Performed by: INTERNAL MEDICINE

## 2019-07-26 PROCEDURE — 94799 UNLISTED PULMONARY SVC/PX: CPT

## 2019-07-26 PROCEDURE — 25010000002 AZITHROMYCIN PER 500 MG: Performed by: INTERNAL MEDICINE

## 2019-07-26 PROCEDURE — 83605 ASSAY OF LACTIC ACID: CPT | Performed by: EMERGENCY MEDICINE

## 2019-07-26 PROCEDURE — 86900 BLOOD TYPING SEROLOGIC ABO: CPT

## 2019-07-26 PROCEDURE — 94760 N-INVAS EAR/PLS OXIMETRY 1: CPT

## 2019-07-26 PROCEDURE — 25010000002 IOPAMIDOL 61 % SOLUTION: Performed by: FAMILY MEDICINE

## 2019-07-26 PROCEDURE — 86923 COMPATIBILITY TEST ELECTRIC: CPT

## 2019-07-26 PROCEDURE — 25010000002 CEFTRIAXONE PER 250 MG: Performed by: INTERNAL MEDICINE

## 2019-07-26 PROCEDURE — 99223 1ST HOSP IP/OBS HIGH 75: CPT | Performed by: INTERNAL MEDICINE

## 2019-07-26 PROCEDURE — 81001 URINALYSIS AUTO W/SCOPE: CPT | Performed by: EMERGENCY MEDICINE

## 2019-07-26 PROCEDURE — 85025 COMPLETE CBC W/AUTO DIFF WBC: CPT | Performed by: INTERNAL MEDICINE

## 2019-07-26 PROCEDURE — 36430 TRANSFUSION BLD/BLD COMPNT: CPT

## 2019-07-26 PROCEDURE — P9016 RBC LEUKOCYTES REDUCED: HCPCS

## 2019-07-26 RX ORDER — BENZONATATE 100 MG/1
200 CAPSULE ORAL 3 TIMES DAILY PRN
Status: DISCONTINUED | OUTPATIENT
Start: 2019-07-26 | End: 2019-07-29 | Stop reason: HOSPADM

## 2019-07-26 RX ORDER — LEVOTHYROXINE SODIUM 137 UG/1
137 TABLET ORAL DAILY
Status: DISCONTINUED | OUTPATIENT
Start: 2019-07-26 | End: 2019-07-28

## 2019-07-26 RX ORDER — LACTULOSE 20 G/30ML
20 SOLUTION ORAL DAILY
Status: DISCONTINUED | OUTPATIENT
Start: 2019-07-26 | End: 2019-07-29 | Stop reason: HOSPADM

## 2019-07-26 RX ORDER — ONDANSETRON 2 MG/ML
4 INJECTION INTRAMUSCULAR; INTRAVENOUS EVERY 6 HOURS PRN
Status: DISCONTINUED | OUTPATIENT
Start: 2019-07-26 | End: 2019-07-29 | Stop reason: HOSPADM

## 2019-07-26 RX ORDER — SODIUM CHLORIDE 0.9 % (FLUSH) 0.9 %
3-10 SYRINGE (ML) INJECTION AS NEEDED
Status: DISCONTINUED | OUTPATIENT
Start: 2019-07-26 | End: 2019-07-29 | Stop reason: HOSPADM

## 2019-07-26 RX ORDER — GUAIFENESIN 600 MG/1
1200 TABLET, EXTENDED RELEASE ORAL EVERY 12 HOURS SCHEDULED
Status: DISCONTINUED | OUTPATIENT
Start: 2019-07-26 | End: 2019-07-29 | Stop reason: HOSPADM

## 2019-07-26 RX ORDER — ASPIRIN 81 MG/1
81 TABLET ORAL DAILY
Status: DISCONTINUED | OUTPATIENT
Start: 2019-07-26 | End: 2019-07-29 | Stop reason: HOSPADM

## 2019-07-26 RX ORDER — ACETAMINOPHEN AND CODEINE PHOSPHATE 300; 30 MG/1; MG/1
1 TABLET ORAL EVERY 8 HOURS PRN
Status: DISCONTINUED | OUTPATIENT
Start: 2019-07-26 | End: 2019-07-29 | Stop reason: HOSPADM

## 2019-07-26 RX ORDER — SODIUM CHLORIDE 0.9 % (FLUSH) 0.9 %
3 SYRINGE (ML) INJECTION EVERY 12 HOURS SCHEDULED
Status: DISCONTINUED | OUTPATIENT
Start: 2019-07-26 | End: 2019-07-29 | Stop reason: HOSPADM

## 2019-07-26 RX ORDER — ALBUTEROL SULFATE 2.5 MG/3ML
2.5 SOLUTION RESPIRATORY (INHALATION) EVERY 4 HOURS PRN
Status: DISCONTINUED | OUTPATIENT
Start: 2019-07-26 | End: 2019-07-29 | Stop reason: HOSPADM

## 2019-07-26 RX ORDER — ACETAMINOPHEN 325 MG/1
650 TABLET ORAL EVERY 4 HOURS PRN
Status: DISCONTINUED | OUTPATIENT
Start: 2019-07-26 | End: 2019-07-29 | Stop reason: HOSPADM

## 2019-07-26 RX ORDER — BUDESONIDE AND FORMOTEROL FUMARATE DIHYDRATE 160; 4.5 UG/1; UG/1
2 AEROSOL RESPIRATORY (INHALATION)
Status: DISCONTINUED | OUTPATIENT
Start: 2019-07-26 | End: 2019-07-28

## 2019-07-26 RX ORDER — ONDANSETRON 4 MG/1
4 TABLET, FILM COATED ORAL EVERY 6 HOURS PRN
Status: DISCONTINUED | OUTPATIENT
Start: 2019-07-26 | End: 2019-07-29 | Stop reason: HOSPADM

## 2019-07-26 RX ORDER — ATORVASTATIN CALCIUM 10 MG/1
10 TABLET, FILM COATED ORAL DAILY
Status: DISCONTINUED | OUTPATIENT
Start: 2019-07-26 | End: 2019-07-29 | Stop reason: HOSPADM

## 2019-07-26 RX ADMIN — LEVOTHYROXINE SODIUM 137 MCG: 137 TABLET ORAL at 11:21

## 2019-07-26 RX ADMIN — ASPIRIN 81 MG: 81 TABLET ORAL at 11:22

## 2019-07-26 RX ADMIN — ALBUTEROL SULFATE 2.5 MG: 2.5 SOLUTION RESPIRATORY (INHALATION) at 13:31

## 2019-07-26 RX ADMIN — IPRATROPIUM BROMIDE 0.5 MG: 0.5 SOLUTION RESPIRATORY (INHALATION) at 19:44

## 2019-07-26 RX ADMIN — IPRATROPIUM BROMIDE 0.5 MG: 0.5 SOLUTION RESPIRATORY (INHALATION) at 06:48

## 2019-07-26 RX ADMIN — GUAIFENESIN 1200 MG: 600 TABLET, EXTENDED RELEASE ORAL at 11:21

## 2019-07-26 RX ADMIN — LACTULOSE 20 G: 20 SOLUTION ORAL at 11:21

## 2019-07-26 RX ADMIN — SODIUM CHLORIDE, PRESERVATIVE FREE 3 ML: 5 INJECTION INTRAVENOUS at 21:47

## 2019-07-26 RX ADMIN — AZITHROMYCIN MONOHYDRATE 500 MG: 500 INJECTION, POWDER, LYOPHILIZED, FOR SOLUTION INTRAVENOUS at 21:47

## 2019-07-26 RX ADMIN — BUDESONIDE AND FORMOTEROL FUMARATE DIHYDRATE 2 PUFF: 160; 4.5 AEROSOL RESPIRATORY (INHALATION) at 19:44

## 2019-07-26 RX ADMIN — IOPAMIDOL 100 ML: 612 INJECTION, SOLUTION INTRAVENOUS at 11:45

## 2019-07-26 RX ADMIN — GUAIFENESIN 1200 MG: 600 TABLET, EXTENDED RELEASE ORAL at 21:47

## 2019-07-26 RX ADMIN — SODIUM CHLORIDE, PRESERVATIVE FREE 3 ML: 5 INJECTION INTRAVENOUS at 11:21

## 2019-07-26 RX ADMIN — ATORVASTATIN CALCIUM 10 MG: 10 TABLET, FILM COATED ORAL at 11:21

## 2019-07-26 RX ADMIN — IPRATROPIUM BROMIDE 0.5 MG: 0.5 SOLUTION RESPIRATORY (INHALATION) at 11:31

## 2019-07-26 RX ADMIN — CEFTRIAXONE SODIUM 1 G: 1 INJECTION, POWDER, FOR SOLUTION INTRAMUSCULAR; INTRAVENOUS at 21:47

## 2019-07-27 LAB
ABO + RH BLD: NORMAL
ABO + RH BLD: NORMAL
BH BB BLOOD EXPIRATION DATE: NORMAL
BH BB BLOOD EXPIRATION DATE: NORMAL
BH BB BLOOD TYPE BARCODE: 5100
BH BB BLOOD TYPE BARCODE: 5100
BH BB DISPENSE STATUS: NORMAL
BH BB DISPENSE STATUS: NORMAL
BH BB PRODUCT CODE: NORMAL
BH BB PRODUCT CODE: NORMAL
BH BB UNIT NUMBER: NORMAL
BH BB UNIT NUMBER: NORMAL
UNIT  ABO: NORMAL
UNIT  ABO: NORMAL
UNIT  RH: NORMAL
UNIT  RH: NORMAL

## 2019-07-27 PROCEDURE — 25010000002 AZITHROMYCIN PER 500 MG: Performed by: INTERNAL MEDICINE

## 2019-07-27 PROCEDURE — 99232 SBSQ HOSP IP/OBS MODERATE 35: CPT | Performed by: INTERNAL MEDICINE

## 2019-07-27 PROCEDURE — 25010000002 CEFTRIAXONE PER 250 MG: Performed by: INTERNAL MEDICINE

## 2019-07-27 PROCEDURE — 94799 UNLISTED PULMONARY SVC/PX: CPT

## 2019-07-27 PROCEDURE — 94760 N-INVAS EAR/PLS OXIMETRY 1: CPT

## 2019-07-27 RX ADMIN — ASPIRIN 81 MG: 81 TABLET ORAL at 10:10

## 2019-07-27 RX ADMIN — SODIUM CHLORIDE, PRESERVATIVE FREE 3 ML: 5 INJECTION INTRAVENOUS at 10:10

## 2019-07-27 RX ADMIN — GUAIFENESIN 1200 MG: 600 TABLET, EXTENDED RELEASE ORAL at 10:10

## 2019-07-27 RX ADMIN — BUDESONIDE AND FORMOTEROL FUMARATE DIHYDRATE 2 PUFF: 160; 4.5 AEROSOL RESPIRATORY (INHALATION) at 20:35

## 2019-07-27 RX ADMIN — AZITHROMYCIN MONOHYDRATE 500 MG: 500 INJECTION, POWDER, LYOPHILIZED, FOR SOLUTION INTRAVENOUS at 22:53

## 2019-07-27 RX ADMIN — IPRATROPIUM BROMIDE 0.5 MG: 0.5 SOLUTION RESPIRATORY (INHALATION) at 12:58

## 2019-07-27 RX ADMIN — LEVOTHYROXINE SODIUM 137 MCG: 137 TABLET ORAL at 10:10

## 2019-07-27 RX ADMIN — ACETAMINOPHEN 650 MG: 325 TABLET, FILM COATED ORAL at 05:06

## 2019-07-27 RX ADMIN — ATORVASTATIN CALCIUM 10 MG: 10 TABLET, FILM COATED ORAL at 10:10

## 2019-07-27 RX ADMIN — IPRATROPIUM BROMIDE 0.5 MG: 0.5 SOLUTION RESPIRATORY (INHALATION) at 06:51

## 2019-07-27 RX ADMIN — GUAIFENESIN 1200 MG: 600 TABLET, EXTENDED RELEASE ORAL at 20:55

## 2019-07-27 RX ADMIN — BUDESONIDE AND FORMOTEROL FUMARATE DIHYDRATE 2 PUFF: 160; 4.5 AEROSOL RESPIRATORY (INHALATION) at 06:51

## 2019-07-27 RX ADMIN — ACETAMINOPHEN 650 MG: 325 TABLET, FILM COATED ORAL at 20:55

## 2019-07-27 RX ADMIN — SODIUM CHLORIDE, PRESERVATIVE FREE 3 ML: 5 INJECTION INTRAVENOUS at 20:55

## 2019-07-27 RX ADMIN — LACTULOSE 20 G: 20 SOLUTION ORAL at 10:10

## 2019-07-27 RX ADMIN — IPRATROPIUM BROMIDE 0.5 MG: 0.5 SOLUTION RESPIRATORY (INHALATION) at 20:35

## 2019-07-28 LAB
ALBUMIN SERPL-MCNC: 2.4 G/DL (ref 3.5–5)
ALBUMIN/GLOB SERPL: 0.8 G/DL (ref 1.1–2.5)
ALP SERPL-CCNC: 255 U/L (ref 24–120)
ALT SERPL W P-5'-P-CCNC: 65 U/L (ref 0–54)
ANION GAP SERPL CALCULATED.3IONS-SCNC: 5 MMOL/L (ref 4–13)
ANISOCYTOSIS BLD QL: ABNORMAL
AST SERPL-CCNC: 51 U/L (ref 7–45)
BILIRUB SERPL-MCNC: 0.7 MG/DL (ref 0.1–1)
BUN BLD-MCNC: 17 MG/DL (ref 5–21)
BUN/CREAT SERPL: 18.9 (ref 7–25)
CALCIUM SPEC-SCNC: 8.3 MG/DL (ref 8.4–10.4)
CHLORIDE SERPL-SCNC: 107 MMOL/L (ref 98–110)
CO2 SERPL-SCNC: 26 MMOL/L (ref 24–31)
CREAT BLD-MCNC: 0.9 MG/DL (ref 0.5–1.4)
DEPRECATED RDW RBC AUTO: 80.9 FL (ref 40–54)
ERYTHROCYTE [DISTWIDTH] IN BLOOD BY AUTOMATED COUNT: 22.5 % (ref 12–15)
GFR SERPL CREATININE-BSD FRML MDRD: 81 ML/MIN/1.73
GIANT PLATELETS: ABNORMAL
GLOBULIN UR ELPH-MCNC: 3.2 GM/DL
GLUCOSE BLD-MCNC: 91 MG/DL (ref 70–100)
HCT VFR BLD AUTO: 25.2 % (ref 40–52)
HGB BLD-MCNC: 8 G/DL (ref 14–18)
HYPOCHROMIA BLD QL: ABNORMAL
LYMPHOCYTES # BLD MANUAL: 0.95 10*3/MM3 (ref 0.72–4.86)
LYMPHOCYTES NFR BLD MANUAL: 11 % (ref 4–12)
LYMPHOCYTES NFR BLD MANUAL: 19 % (ref 15–45)
MCH RBC QN AUTO: 32 PG (ref 28–32)
MCHC RBC AUTO-ENTMCNC: 31.7 G/DL (ref 33–36)
MCV RBC AUTO: 100.8 FL (ref 82–95)
MONOCYTES # BLD AUTO: 0.55 10*3/MM3 (ref 0.19–1.3)
NEUTROPHILS # BLD AUTO: 3.5 10*3/MM3 (ref 1.87–8.4)
NEUTROPHILS NFR BLD MANUAL: 65 % (ref 39–78)
NEUTS BAND NFR BLD MANUAL: 5 % (ref 0–10)
NRBC SPEC MANUAL: 1 /100 WBC (ref 0–0.2)
PLATELET # BLD AUTO: 119 10*3/MM3 (ref 130–400)
PMV BLD AUTO: 13.4 FL (ref 6–12)
POIKILOCYTOSIS BLD QL SMEAR: ABNORMAL
POLYCHROMASIA BLD QL SMEAR: ABNORMAL
POTASSIUM BLD-SCNC: 4.5 MMOL/L (ref 3.5–5.3)
PROT SERPL-MCNC: 5.6 G/DL (ref 6.3–8.7)
RBC # BLD AUTO: 2.5 10*6/MM3 (ref 4.8–5.9)
SMALL PLATELETS BLD QL SMEAR: ABNORMAL
SODIUM BLD-SCNC: 138 MMOL/L (ref 135–145)
WBC MORPH BLD: NORMAL
WBC NRBC COR # BLD: 5 10*3/MM3 (ref 4.8–10.8)

## 2019-07-28 PROCEDURE — 86923 COMPATIBILITY TEST ELECTRIC: CPT

## 2019-07-28 PROCEDURE — 85025 COMPLETE CBC W/AUTO DIFF WBC: CPT | Performed by: INTERNAL MEDICINE

## 2019-07-28 PROCEDURE — 80053 COMPREHEN METABOLIC PANEL: CPT | Performed by: NURSE PRACTITIONER

## 2019-07-28 PROCEDURE — 25010000002 CEFTRIAXONE PER 250 MG: Performed by: INTERNAL MEDICINE

## 2019-07-28 PROCEDURE — 86900 BLOOD TYPING SEROLOGIC ABO: CPT

## 2019-07-28 PROCEDURE — 99232 SBSQ HOSP IP/OBS MODERATE 35: CPT | Performed by: INTERNAL MEDICINE

## 2019-07-28 PROCEDURE — P9016 RBC LEUKOCYTES REDUCED: HCPCS

## 2019-07-28 PROCEDURE — 94799 UNLISTED PULMONARY SVC/PX: CPT

## 2019-07-28 PROCEDURE — 94760 N-INVAS EAR/PLS OXIMETRY 1: CPT

## 2019-07-28 PROCEDURE — 85007 BL SMEAR W/DIFF WBC COUNT: CPT | Performed by: INTERNAL MEDICINE

## 2019-07-28 PROCEDURE — 36430 TRANSFUSION BLD/BLD COMPNT: CPT

## 2019-07-28 RX ORDER — LEVOTHYROXINE SODIUM 0.12 MG/1
125 TABLET ORAL
Status: DISCONTINUED | OUTPATIENT
Start: 2019-07-29 | End: 2019-07-29 | Stop reason: HOSPADM

## 2019-07-28 RX ORDER — ALBUTEROL SULFATE 90 UG/1
2 AEROSOL, METERED RESPIRATORY (INHALATION) EVERY 4 HOURS PRN
COMMUNITY
End: 2019-08-12

## 2019-07-28 RX ORDER — CLOTRIMAZOLE 10 MG/1
10 LOZENGE ORAL; TOPICAL
Status: DISCONTINUED | OUTPATIENT
Start: 2019-07-28 | End: 2019-07-29 | Stop reason: HOSPADM

## 2019-07-28 RX ORDER — ACETAMINOPHEN AND CODEINE PHOSPHATE 300; 30 MG/1; MG/1
1 TABLET ORAL 3 TIMES DAILY PRN
Status: ON HOLD | COMMUNITY
End: 2019-12-09

## 2019-07-28 RX ADMIN — ASPIRIN 81 MG: 81 TABLET ORAL at 08:05

## 2019-07-28 RX ADMIN — LACTULOSE 20 G: 20 SOLUTION ORAL at 08:05

## 2019-07-28 RX ADMIN — CLOTRIMAZOLE 10 MG: 10 LOZENGE ORAL; TOPICAL at 14:39

## 2019-07-28 RX ADMIN — ATORVASTATIN CALCIUM 10 MG: 10 TABLET, FILM COATED ORAL at 08:05

## 2019-07-28 RX ADMIN — BUDESONIDE AND FORMOTEROL FUMARATE DIHYDRATE 2 PUFF: 160; 4.5 AEROSOL RESPIRATORY (INHALATION) at 06:41

## 2019-07-28 RX ADMIN — IPRATROPIUM BROMIDE 0.5 MG: 0.5 SOLUTION RESPIRATORY (INHALATION) at 06:41

## 2019-07-28 RX ADMIN — IPRATROPIUM BROMIDE 0.5 MG: 0.5 SOLUTION RESPIRATORY (INHALATION) at 13:45

## 2019-07-28 RX ADMIN — GUAIFENESIN 1200 MG: 600 TABLET, EXTENDED RELEASE ORAL at 22:23

## 2019-07-28 RX ADMIN — CEFTRIAXONE SODIUM 2 G: 2 INJECTION, POWDER, FOR SOLUTION INTRAMUSCULAR; INTRAVENOUS at 01:00

## 2019-07-28 RX ADMIN — GUAIFENESIN 1200 MG: 600 TABLET, EXTENDED RELEASE ORAL at 08:05

## 2019-07-28 RX ADMIN — CLOTRIMAZOLE 10 MG: 10 LOZENGE ORAL; TOPICAL at 22:23

## 2019-07-28 RX ADMIN — CLOTRIMAZOLE 10 MG: 10 LOZENGE ORAL; TOPICAL at 18:13

## 2019-07-28 RX ADMIN — ACETAMINOPHEN 650 MG: 325 TABLET, FILM COATED ORAL at 12:56

## 2019-07-28 RX ADMIN — SODIUM CHLORIDE, PRESERVATIVE FREE 3 ML: 5 INJECTION INTRAVENOUS at 08:05

## 2019-07-28 RX ADMIN — LEVOTHYROXINE SODIUM 137 MCG: 137 TABLET ORAL at 08:05

## 2019-07-28 RX ADMIN — SODIUM CHLORIDE, PRESERVATIVE FREE 3 ML: 5 INJECTION INTRAVENOUS at 22:23

## 2019-07-29 VITALS
BODY MASS INDEX: 22.29 KG/M2 | OXYGEN SATURATION: 94 % | RESPIRATION RATE: 18 BRPM | TEMPERATURE: 97.9 F | DIASTOLIC BLOOD PRESSURE: 87 MMHG | WEIGHT: 159.2 LBS | SYSTOLIC BLOOD PRESSURE: 134 MMHG | HEART RATE: 82 BPM | HEIGHT: 71 IN

## 2019-07-29 LAB
ABO + RH BLD: NORMAL
ABO + RH BLD: NORMAL
ANION GAP SERPL CALCULATED.3IONS-SCNC: 7 MMOL/L (ref 4–13)
B PARAPERT DNA SPEC QL NAA+PROBE: NOT DETECTED
B PERT DNA SPEC QL NAA+PROBE: NOT DETECTED
BASOPHILS # BLD AUTO: 0.03 10*3/MM3 (ref 0–0.2)
BASOPHILS NFR BLD AUTO: 0.5 % (ref 0–2)
BH BB BLOOD EXPIRATION DATE: NORMAL
BH BB BLOOD EXPIRATION DATE: NORMAL
BH BB BLOOD TYPE BARCODE: 5100
BH BB BLOOD TYPE BARCODE: 5100
BH BB DISPENSE STATUS: NORMAL
BH BB DISPENSE STATUS: NORMAL
BH BB PRODUCT CODE: NORMAL
BH BB PRODUCT CODE: NORMAL
BH BB UNIT NUMBER: NORMAL
BH BB UNIT NUMBER: NORMAL
BUN BLD-MCNC: 17 MG/DL (ref 5–21)
BUN/CREAT SERPL: 18.9 (ref 7–25)
C PNEUM DNA NPH QL NAA+NON-PROBE: NOT DETECTED
CALCIUM SPEC-SCNC: 8 MG/DL (ref 8.4–10.4)
CHLORIDE SERPL-SCNC: 104 MMOL/L (ref 98–110)
CO2 SERPL-SCNC: 24 MMOL/L (ref 24–31)
CREAT BLD-MCNC: 0.9 MG/DL (ref 0.5–1.4)
DEPRECATED RDW RBC AUTO: 75.6 FL (ref 40–54)
EOSINOPHIL # BLD AUTO: 0.12 10*3/MM3 (ref 0–0.7)
EOSINOPHIL NFR BLD AUTO: 2.2 % (ref 0–4)
ERYTHROCYTE [DISTWIDTH] IN BLOOD BY AUTOMATED COUNT: 22.4 % (ref 12–15)
FLUAV H1 2009 PAND RNA NPH QL NAA+PROBE: NOT DETECTED
FLUAV H1 HA GENE NPH QL NAA+PROBE: NOT DETECTED
FLUAV H3 RNA NPH QL NAA+PROBE: NOT DETECTED
FLUAV SUBTYP SPEC NAA+PROBE: NOT DETECTED
FLUBV RNA ISLT QL NAA+PROBE: NOT DETECTED
GFR SERPL CREATININE-BSD FRML MDRD: 81 ML/MIN/1.73
GLUCOSE BLD-MCNC: 90 MG/DL (ref 70–100)
HADV DNA SPEC NAA+PROBE: NOT DETECTED
HCOV 229E RNA SPEC QL NAA+PROBE: NOT DETECTED
HCOV HKU1 RNA SPEC QL NAA+PROBE: NOT DETECTED
HCOV NL63 RNA SPEC QL NAA+PROBE: NOT DETECTED
HCOV OC43 RNA SPEC QL NAA+PROBE: NOT DETECTED
HCT VFR BLD AUTO: 29.5 % (ref 40–52)
HGB BLD-MCNC: 9.6 G/DL (ref 14–18)
HMPV RNA NPH QL NAA+NON-PROBE: NOT DETECTED
HPIV1 RNA SPEC QL NAA+PROBE: NOT DETECTED
HPIV2 RNA SPEC QL NAA+PROBE: NOT DETECTED
HPIV3 RNA NPH QL NAA+PROBE: NOT DETECTED
HPIV4 P GENE NPH QL NAA+PROBE: NOT DETECTED
IMM GRANULOCYTES # BLD AUTO: 0.05 10*3/MM3 (ref 0–0.05)
IMM GRANULOCYTES NFR BLD AUTO: 0.9 % (ref 0–5)
LYMPHOCYTES # BLD AUTO: 1.34 10*3/MM3 (ref 0.72–4.86)
LYMPHOCYTES NFR BLD AUTO: 24.4 % (ref 15–45)
M PNEUMO IGG SER IA-ACNC: NOT DETECTED
MCH RBC QN AUTO: 31.2 PG (ref 28–32)
MCHC RBC AUTO-ENTMCNC: 32.5 G/DL (ref 33–36)
MCV RBC AUTO: 95.8 FL (ref 82–95)
MONOCYTES # BLD AUTO: 0.64 10*3/MM3 (ref 0.19–1.3)
MONOCYTES NFR BLD AUTO: 11.7 % (ref 4–12)
NEUTROPHILS # BLD AUTO: 3.31 10*3/MM3 (ref 1.87–8.4)
NEUTROPHILS NFR BLD AUTO: 60.3 % (ref 39–78)
NRBC BLD AUTO-RTO: 0.5 /100 WBC (ref 0–0.2)
PLATELET # BLD AUTO: 117 10*3/MM3 (ref 130–400)
PMV BLD AUTO: 13.9 FL (ref 6–12)
POTASSIUM BLD-SCNC: 4.2 MMOL/L (ref 3.5–5.3)
RBC # BLD AUTO: 3.08 10*6/MM3 (ref 4.8–5.9)
RHINOVIRUS RNA SPEC NAA+PROBE: NOT DETECTED
RSV RNA NPH QL NAA+NON-PROBE: NOT DETECTED
SODIUM BLD-SCNC: 135 MMOL/L (ref 135–145)
UNIT  ABO: NORMAL
UNIT  ABO: NORMAL
UNIT  RH: NORMAL
UNIT  RH: NORMAL
WBC NRBC COR # BLD: 5.49 10*3/MM3 (ref 4.8–10.8)

## 2019-07-29 PROCEDURE — 80048 BASIC METABOLIC PNL TOTAL CA: CPT | Performed by: NURSE PRACTITIONER

## 2019-07-29 PROCEDURE — 85025 COMPLETE CBC W/AUTO DIFF WBC: CPT | Performed by: INTERNAL MEDICINE

## 2019-07-29 PROCEDURE — 25010000002 EPOETIN ALFA PER 1000 UNITS: Performed by: INTERNAL MEDICINE

## 2019-07-29 PROCEDURE — 94799 UNLISTED PULMONARY SVC/PX: CPT

## 2019-07-29 PROCEDURE — 25010000002 CEFTRIAXONE PER 250 MG: Performed by: INTERNAL MEDICINE

## 2019-07-29 PROCEDURE — 94760 N-INVAS EAR/PLS OXIMETRY 1: CPT

## 2019-07-29 RX ORDER — CLOTRIMAZOLE 10 MG/1
10 LOZENGE ORAL; TOPICAL
Qty: 30 TABLET | Refills: 0 | Status: SHIPPED | OUTPATIENT
Start: 2019-07-29 | End: 2019-08-04

## 2019-07-29 RX ORDER — LIDOCAINE 50 MG/G
2 PATCH TOPICAL
Status: DISCONTINUED | OUTPATIENT
Start: 2019-07-29 | End: 2019-07-29 | Stop reason: HOSPADM

## 2019-07-29 RX ORDER — BENZONATATE 200 MG/1
200 CAPSULE ORAL 3 TIMES DAILY PRN
Qty: 20 CAPSULE | Refills: 0 | Status: SHIPPED | OUTPATIENT
Start: 2019-07-29 | End: 2019-08-12

## 2019-07-29 RX ORDER — CEFDINIR 300 MG/1
300 CAPSULE ORAL EVERY 12 HOURS SCHEDULED
Status: DISCONTINUED | OUTPATIENT
Start: 2019-07-30 | End: 2019-07-29 | Stop reason: HOSPADM

## 2019-07-29 RX ORDER — GUAIFENESIN 600 MG/1
1200 TABLET, EXTENDED RELEASE ORAL EVERY 12 HOURS SCHEDULED
Qty: 20 TABLET | Refills: 0 | Status: SHIPPED | OUTPATIENT
Start: 2019-07-29 | End: 2019-08-03

## 2019-07-29 RX ORDER — CEFDINIR 300 MG/1
300 CAPSULE ORAL EVERY 12 HOURS SCHEDULED
Qty: 6 CAPSULE | Refills: 0 | Status: SHIPPED | OUTPATIENT
Start: 2019-07-30 | End: 2019-08-02

## 2019-07-29 RX ADMIN — ATORVASTATIN CALCIUM 10 MG: 10 TABLET, FILM COATED ORAL at 09:52

## 2019-07-29 RX ADMIN — CLOTRIMAZOLE 10 MG: 10 LOZENGE ORAL; TOPICAL at 13:28

## 2019-07-29 RX ADMIN — CEFTRIAXONE SODIUM 2 G: 2 INJECTION, POWDER, FOR SOLUTION INTRAMUSCULAR; INTRAVENOUS at 03:03

## 2019-07-29 RX ADMIN — SODIUM CHLORIDE, PRESERVATIVE FREE 3 ML: 5 INJECTION INTRAVENOUS at 10:01

## 2019-07-29 RX ADMIN — LACTULOSE 20 G: 20 SOLUTION ORAL at 09:52

## 2019-07-29 RX ADMIN — ERYTHROPOIETIN 40000 UNITS: 40000 INJECTION, SOLUTION INTRAVENOUS; SUBCUTANEOUS at 13:28

## 2019-07-29 RX ADMIN — ASPIRIN 81 MG: 81 TABLET ORAL at 09:52

## 2019-07-29 RX ADMIN — CLOTRIMAZOLE 10 MG: 10 LOZENGE ORAL; TOPICAL at 06:46

## 2019-07-29 RX ADMIN — ACETAMINOPHEN AND CODEINE PHOSPHATE 1 TABLET: 300; 30 TABLET ORAL at 13:48

## 2019-07-29 RX ADMIN — GUAIFENESIN 1200 MG: 600 TABLET, EXTENDED RELEASE ORAL at 09:52

## 2019-07-29 RX ADMIN — LEVOTHYROXINE SODIUM 125 MCG: 125 TABLET ORAL at 06:46

## 2019-07-29 RX ADMIN — CLOTRIMAZOLE 10 MG: 10 LOZENGE ORAL; TOPICAL at 17:23

## 2019-07-29 RX ADMIN — LIDOCAINE 2 PATCH: 50 PATCH TOPICAL at 13:29

## 2019-07-30 ENCOUNTER — READMISSION MANAGEMENT (OUTPATIENT)
Dept: CALL CENTER | Facility: HOSPITAL | Age: 79
End: 2019-07-30

## 2019-07-30 LAB
BACTERIA SPEC AEROBE CULT: NORMAL
BACTERIA SPEC AEROBE CULT: NORMAL

## 2019-07-30 NOTE — TELEPHONE ENCOUNTER
[facility] Transitions Initial Follow Up Call    Outreach made within 2 business days of discharge: Yes    Patient: Duane Reno Patient : 1940   MRN: [id number]  Reason for Admission: Patient was admitted on 2019 due to pneumonia of right lower lobe due to infectious organism. Presenting Problem/History of Present Illness:  Shortness of breath    Final Discharge Diagnoses: Active Hospital Problems   Diagnosis    Pneumonia of right lower lobe due to infectious organism (CMS/[facility])    Shortness of breath    Anemia, macrocytic    Nocturnal hypoxemia    COPD (chronic obstructive pulmonary disease) (CMS/[facility])    MDS (myelodysplastic syndrome) (CMS/[facility])     Discharge Date: 2019       Spoke with: patient. Discharge department/facility: [address] Interactive Patient Contact:  Was patient able to fill all prescriptions: Yes  Was patient instructed to bring all medications to the follow-up visit: Yes  Is patient taking all medications as directed in the discharge summary? Yes  Does patient understand their discharge instructions: Yes  Does patient have questions or concerns that need addressed prior to 7-14 day follow up office visit: no  Per patient he is \"still under the weather\" but his breathing is better than in hospital.  Bowels and bladder working OK. Appetite has improved today. Slept well since home. Patient to bring medications to HFU and will contact office with any concerns.     Scheduled appointment with PCP within 7-14 days    Follow Up  Future Appointments   Date Time Provider Arabella Wheat   2019 10:15 AM Cornelio Katayama, APRN [facility]   2019  1:00 PM [facility] LPN

## 2019-07-30 NOTE — OUTREACH NOTE
Prep Survey      Responses   Facility patient discharged from?  [place]   Is patient eligible?  Yes   Discharge diagnosis  Pneumonia of RLL, shortness of breath, anemia, nocturnal hypoxemia, COPD, MDS   Does the patient have one of the following disease processes/diagnoses(primary or secondary)?  COPD/Pneumonia   Does the patient have Home health ordered?  No   Is there a DME ordered?  No   Comments regarding appointments  Pt to schedule follow up appointments   Prep survey completed?  Yes          Cami Morales RN

## 2019-07-31 ENCOUNTER — READMISSION MANAGEMENT (OUTPATIENT)
Dept: CALL CENTER | Facility: HOSPITAL | Age: 79
End: 2019-07-31

## 2019-07-31 NOTE — OUTREACH NOTE
COPD/PN Week 1 Survey      Responses   Facility patient discharged from?  [place]   Does the patient have one of the following disease processes/diagnoses(primary or secondary)?  COPD/Pneumonia   Is there a successful TCM telephone encounter documented?  No   Was the primary reason for admission:  Pneumonia   Week 1 attempt successful?  No   Unsuccessful attempts  Attempt 1          Nallely Arias RN

## 2019-08-01 ENCOUNTER — READMISSION MANAGEMENT (OUTPATIENT)
Dept: CALL CENTER | Facility: HOSPITAL | Age: 79
End: 2019-08-01

## 2019-08-01 NOTE — OUTREACH NOTE
COPD/PN Week 1 Survey      Responses   Facility patient discharged from?  [place]   Does the patient have one of the following disease processes/diagnoses(primary or secondary)?  COPD/Pneumonia   Is there a successful TCM telephone encounter documented?  No   Was the primary reason for admission:  Pneumonia   Week 1 attempt successful?  Yes   Call start time  1419   Call end time  1423   Discharge diagnosis  Pneumonia of RLL, shortness of breath, anemia, nocturnal hypoxemia, COPD, MDS   Is patient permission given to speak with other caregiver?  Yes   Person spoke with today (if not patient) and relationship  Faith/ wife   Meds reviewed with patient/caregiver?  Yes   Is the patient having any side effects they believe may be caused by any medication additions or changes?  No   Does the patient have all medications ordered at discharge?  Yes   Is the patient taking all medications as directed (includes completed medication regime)?  Yes   Does the patient have a primary care provider?   Yes   Does the patient have an appointment with their PCP or pulmonologist within 7 days of discharge?  Yes   Comments regarding PCP  Has a followup with PCP Martha joel on Monday 08/05/2019   Has the patient kept scheduled appointments due by today?  N/A   Has home health visited the patient within 72 hours of discharge?  N/A   Psychosocial issues?  No   Comments  Wife reports he is doing well. Still with some SOB, but has improved since discharge.    Did the patient receive a copy of their discharge instructions?  Yes   Nursing interventions  Reviewed instructions with patient   What is the patient's perception of their health status since discharge?  Improving   Nursing Interventions  Nurse provided patient education   Is the patient/caregiver able to teach back the hierarchy of who to call/visit for symptoms/problems? PCP, Specialist, Home health nurse, Urgent Care, ED, 911  Yes   Additional teach back comments  If develops fever,  chills, worsening SOB, or chest pain seek medical attention right away.    Is the patient/caregiver able to teach back signs and symptoms of worsening condition:  Fever/chills, Shortness of breath, Chest pain   Is the patient/caregiver able to teach back importance of completing antibiotic course of treatment?  Yes   Week 1 call completed?  Yes          Fabricio Ayala RN

## 2019-08-02 ENCOUNTER — INFUSION (OUTPATIENT)
Dept: ONCOLOGY | Facility: HOSPITAL | Age: 79
End: 2019-08-02

## 2019-08-02 ENCOUNTER — LAB (OUTPATIENT)
Dept: LAB | Facility: HOSPITAL | Age: 79
End: 2019-08-02

## 2019-08-02 VITALS
RESPIRATION RATE: 19 BRPM | WEIGHT: 156 LBS | HEIGHT: 71 IN | OXYGEN SATURATION: 95 % | TEMPERATURE: 97.9 F | HEART RATE: 86 BPM | DIASTOLIC BLOOD PRESSURE: 59 MMHG | BODY MASS INDEX: 21.84 KG/M2 | SYSTOLIC BLOOD PRESSURE: 147 MMHG

## 2019-08-02 DIAGNOSIS — D64.9 ANEMIA, UNSPECIFIED TYPE: ICD-10-CM

## 2019-08-02 DIAGNOSIS — D50.9 IRON DEFICIENCY ANEMIA, UNSPECIFIED IRON DEFICIENCY ANEMIA TYPE: ICD-10-CM

## 2019-08-02 DIAGNOSIS — D46.9 MDS (MYELODYSPLASTIC SYNDROME) (HCC): Primary | ICD-10-CM

## 2019-08-02 DIAGNOSIS — D46.9 MYELODYSPLASTIC SYNDROME (HCC): ICD-10-CM

## 2019-08-02 LAB
BASOPHILS # BLD AUTO: 0.04 10*3/MM3 (ref 0–0.2)
BASOPHILS NFR BLD AUTO: 0.9 % (ref 0–2)
DEPRECATED RDW RBC AUTO: 76.9 FL (ref 40–54)
EOSINOPHIL # BLD AUTO: 0.17 10*3/MM3 (ref 0–0.7)
EOSINOPHIL NFR BLD AUTO: 3.7 % (ref 0–4)
ERYTHROCYTE [DISTWIDTH] IN BLOOD BY AUTOMATED COUNT: 21.3 % (ref 12–15)
HCT VFR BLD AUTO: 34.3 % (ref 40–52)
HGB BLD-MCNC: 10.8 G/DL (ref 14–18)
LYMPHOCYTES # BLD AUTO: 0.74 10*3/MM3 (ref 0.72–4.86)
LYMPHOCYTES NFR BLD AUTO: 16.2 % (ref 15–45)
MCH RBC QN AUTO: 31.6 PG (ref 28–32)
MCHC RBC AUTO-ENTMCNC: 31.5 G/DL (ref 33–36)
MCV RBC AUTO: 100.3 FL (ref 82–95)
MONOCYTES # BLD AUTO: 0.51 10*3/MM3 (ref 0.19–1.3)
MONOCYTES NFR BLD AUTO: 11.2 % (ref 4–12)
NEUTROPHILS # BLD AUTO: 3.07 10*3/MM3 (ref 1.87–8.4)
NEUTROPHILS NFR BLD AUTO: 67.1 % (ref 39–78)
PLATELET # BLD AUTO: 177 10*3/MM3 (ref 130–400)
PMV BLD AUTO: 13.6 FL (ref 6–12)
RBC # BLD AUTO: 3.42 10*6/MM3 (ref 4.8–5.9)
WBC NRBC COR # BLD: 4.57 10*3/MM3 (ref 4.8–10.8)

## 2019-08-02 PROCEDURE — 96372 THER/PROPH/DIAG INJ SC/IM: CPT

## 2019-08-02 PROCEDURE — 85025 COMPLETE CBC W/AUTO DIFF WBC: CPT | Performed by: INTERNAL MEDICINE

## 2019-08-02 PROCEDURE — 36415 COLL VENOUS BLD VENIPUNCTURE: CPT

## 2019-08-02 PROCEDURE — 25010000002 EPOETIN ALFA PER 1000 UNITS: Performed by: INTERNAL MEDICINE

## 2019-08-02 RX ADMIN — ERYTHROPOIETIN 40000 UNITS: 40000 INJECTION, SOLUTION INTRAVENOUS; SUBCUTANEOUS at 12:56

## 2019-08-05 PROBLEM — D61.9 ANEMIA DUE TO BONE MARROW FAILURE (HCC): Status: ACTIVE | Noted: 2019-01-01

## 2019-08-05 PROBLEM — G47.34 NOCTURNAL HYPOXIA: Status: ACTIVE | Noted: 2019-01-01

## 2019-08-05 NOTE — PROGRESS NOTES
100-62.5-25 MCG/INH AEPB               VENTOLIN  (90 Base) MCG/ACT inhaler  INHALE 1 TO 2 PUFFS BY MOUTH EVERY 4 TO 6 HOURS AS NEEDED AND DIRECTED                   Medications marked \"taking\" at this time  Outpatient Medications Marked as Taking for the 8/5/19 encounter (Office Visit) with BHUPENDRA Helm   Medication Sig Dispense Refill    megestrol (MEGACE ES) 625 MG/5ML suspension Take 5 mLs by mouth daily 150 mL 3    acetaminophen-codeine (TYLENOL/CODEINE #3) 300-30 MG per tablet Take 1 tablet by mouth 2 times daily as needed for Pain for up to 60 days. 42 tablet 0    simvastatin (ZOCOR) 20 MG tablet TAKE 1 TABLET BY MOUTH EVERY DAY 90 tablet 2    levothyroxine (SYNTHROID) 125 MCG tablet Take 1 tablet by mouth Daily Take 1 tablet 6 days a week 90 tablet 1    VENTOLIN  (90 Base) MCG/ACT inhaler INHALE 1 TO 2 PUFFS BY MOUTH EVERY 4 TO 6 HOURS AS NEEDED AND DIRECTED 1 Inhaler 11    TRELEGY ELLIPTA 100-62.5-25 MCG/INH AEPB       tiotropium (SPIRIVA HANDIHALER) 18 MCG inhalation capsule Inhale 1 capsule into the lungs daily 30 capsule 5    PROCRIT 88937 UNIT/ML injection       aspirin 81 MG tablet Take 81 mg by mouth daily      melatonin 1 MG tablet Take 1 mg by mouth Take 1, 2 or 3 mg nightly prn          Medications patient taking as of now reconciled against medications ordered at time of hospital discharge: Yes    Chief Complaint   Patient presents with    Follow-Up from Hospital     Patient is here for follow up from hospital for pneumonia. Patient states still having cough and right rib pain. History of Present illness - Follow up of Hospital diagnosis(es):   1. RLL pneumonia;    2. MDS   3. Anemia  4. COPD   5. Nocturnal hypoxia;    6  Chronic back pain     Inpatient course: Discharge summary reviewed- see chart. Interval history/Current status: *  1. RLL  No colored sputum; Has finished abx  Still on mucinex ;   He sees Dr Susana Garcia first of next month with a repeat

## 2019-08-08 ENCOUNTER — READMISSION MANAGEMENT (OUTPATIENT)
Dept: CALL CENTER | Facility: HOSPITAL | Age: 79
End: 2019-08-08

## 2019-08-08 NOTE — OUTREACH NOTE
COPD/PN Week 2 Survey      Responses   Facility patient discharged from?  [place]   Does the patient have one of the following disease processes/diagnoses(primary or secondary)?  COPD/Pneumonia   Was the primary reason for admission:  Pneumonia   Week 2 attempt successful?  No   Unsuccessful attempts  Attempt 1          Lanie Alonso RN

## 2019-08-09 ENCOUNTER — INFUSION (OUTPATIENT)
Dept: ONCOLOGY | Facility: HOSPITAL | Age: 79
End: 2019-08-09

## 2019-08-09 ENCOUNTER — LAB (OUTPATIENT)
Dept: LAB | Facility: HOSPITAL | Age: 79
End: 2019-08-09

## 2019-08-09 ENCOUNTER — READMISSION MANAGEMENT (OUTPATIENT)
Dept: CALL CENTER | Facility: HOSPITAL | Age: 79
End: 2019-08-09

## 2019-08-09 VITALS
WEIGHT: 156 LBS | DIASTOLIC BLOOD PRESSURE: 63 MMHG | HEIGHT: 71 IN | SYSTOLIC BLOOD PRESSURE: 116 MMHG | RESPIRATION RATE: 22 BRPM | BODY MASS INDEX: 21.84 KG/M2 | HEART RATE: 84 BPM | OXYGEN SATURATION: 100 % | TEMPERATURE: 97.8 F

## 2019-08-09 DIAGNOSIS — D46.9 MYELODYSPLASTIC SYNDROME (HCC): ICD-10-CM

## 2019-08-09 DIAGNOSIS — D46.9 MDS (MYELODYSPLASTIC SYNDROME) (HCC): Primary | ICD-10-CM

## 2019-08-09 DIAGNOSIS — D50.9 IRON DEFICIENCY ANEMIA, UNSPECIFIED IRON DEFICIENCY ANEMIA TYPE: ICD-10-CM

## 2019-08-09 DIAGNOSIS — D64.9 ANEMIA, UNSPECIFIED TYPE: ICD-10-CM

## 2019-08-09 LAB
ALBUMIN SERPL-MCNC: 3.8 G/DL (ref 3.5–5)
ALBUMIN/GLOB SERPL: 0.9 G/DL (ref 1.1–2.5)
ALP SERPL-CCNC: 271 U/L (ref 24–120)
ALT SERPL W P-5'-P-CCNC: 109 U/L (ref 0–54)
ANION GAP SERPL CALCULATED.3IONS-SCNC: 7 MMOL/L (ref 4–13)
AST SERPL-CCNC: 95 U/L (ref 7–45)
BASOPHILS # BLD AUTO: 0.04 10*3/MM3 (ref 0–0.2)
BASOPHILS NFR BLD AUTO: 0.6 % (ref 0–1.5)
BILIRUB SERPL-MCNC: 1.1 MG/DL (ref 0.1–1)
BUN BLD-MCNC: 18 MG/DL (ref 5–21)
BUN/CREAT SERPL: 17.6 (ref 7–25)
CALCIUM SPEC-SCNC: 9.4 MG/DL (ref 8.4–10.4)
CHLORIDE SERPL-SCNC: 101 MMOL/L (ref 98–110)
CO2 SERPL-SCNC: 30 MMOL/L (ref 24–31)
CREAT BLD-MCNC: 1.02 MG/DL (ref 0.5–1.4)
DEPRECATED RDW RBC AUTO: 75.7 FL (ref 37–54)
EOSINOPHIL # BLD AUTO: 0.08 10*3/MM3 (ref 0–0.4)
EOSINOPHIL NFR BLD AUTO: 1.3 % (ref 0.3–6.2)
ERYTHROCYTE [DISTWIDTH] IN BLOOD BY AUTOMATED COUNT: 21.2 % (ref 12.3–15.4)
GFR SERPL CREATININE-BSD FRML MDRD: 70 ML/MIN/1.73
GLOBULIN UR ELPH-MCNC: 4.2 GM/DL
GLUCOSE BLD-MCNC: 113 MG/DL (ref 70–100)
HCT VFR BLD AUTO: 35.1 % (ref 37.5–51)
HGB BLD-MCNC: 11 G/DL (ref 13–17.7)
IRON 24H UR-MRATE: 64 MCG/DL (ref 42–180)
IRON 24H UR-MRATE: 64 MCG/DL (ref 42–180)
IRON SATN MFR SERPL: 30 % (ref 20–45)
LYMPHOCYTES # BLD AUTO: 1.28 10*3/MM3 (ref 0.7–3.1)
LYMPHOCYTES NFR BLD AUTO: 20 % (ref 19.6–45.3)
MCH RBC QN AUTO: 31.1 PG (ref 26.6–33)
MCHC RBC AUTO-ENTMCNC: 31.3 G/DL (ref 31.5–35.7)
MCV RBC AUTO: 99.2 FL (ref 79–97)
MONOCYTES # BLD AUTO: 0.61 10*3/MM3 (ref 0.1–0.9)
MONOCYTES NFR BLD AUTO: 9.5 % (ref 5–12)
NEUTROPHILS # BLD AUTO: 4.34 10*3/MM3 (ref 1.7–7)
NEUTROPHILS NFR BLD AUTO: 68 % (ref 42.7–76)
PLATELET # BLD AUTO: 125 10*3/MM3 (ref 140–450)
POTASSIUM BLD-SCNC: 4.1 MMOL/L (ref 3.5–5.3)
PROT SERPL-MCNC: 8 G/DL (ref 6.3–8.7)
RBC # BLD AUTO: 3.54 10*6/MM3 (ref 4.14–5.8)
SODIUM BLD-SCNC: 138 MMOL/L (ref 135–145)
TIBC SERPL-MCNC: 215 MCG/DL (ref 225–420)
WBC NRBC COR # BLD: 6.39 10*3/MM3 (ref 3.4–10.8)

## 2019-08-09 PROCEDURE — 25010000002 EPOETIN ALFA PER 1000 UNITS: Performed by: INTERNAL MEDICINE

## 2019-08-09 PROCEDURE — 36415 COLL VENOUS BLD VENIPUNCTURE: CPT

## 2019-08-09 PROCEDURE — 83540 ASSAY OF IRON: CPT | Performed by: INTERNAL MEDICINE

## 2019-08-09 PROCEDURE — 80053 COMPREHEN METABOLIC PANEL: CPT | Performed by: INTERNAL MEDICINE

## 2019-08-09 PROCEDURE — 96372 THER/PROPH/DIAG INJ SC/IM: CPT

## 2019-08-09 PROCEDURE — 85025 COMPLETE CBC W/AUTO DIFF WBC: CPT | Performed by: INTERNAL MEDICINE

## 2019-08-09 PROCEDURE — 83550 IRON BINDING TEST: CPT | Performed by: INTERNAL MEDICINE

## 2019-08-09 PROCEDURE — 82728 ASSAY OF FERRITIN: CPT | Performed by: INTERNAL MEDICINE

## 2019-08-09 RX ADMIN — ERYTHROPOIETIN 40000 UNITS: 40000 INJECTION, SOLUTION INTRAVENOUS; SUBCUTANEOUS at 12:28

## 2019-08-09 NOTE — OUTREACH NOTE
COPD/PN Week 2 Survey      Responses   Facility patient discharged from?  [place]   Does the patient have one of the following disease processes/diagnoses(primary or secondary)?  COPD/Pneumonia   Was the primary reason for admission:  Pneumonia   Week 2 attempt successful?  Yes   Rescheduled  Rescheduled-pt requested          Jeremiah Salguero RN

## 2019-08-10 LAB — FERRITIN SERPL-MCNC: 3635 NG/ML (ref 30–400)

## 2019-08-11 NOTE — PROGRESS NOTES
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  He is on B12 shots.  He had Injectafer 1.5 months ago. He had no response to oral iron in the past.  He is on MADELAINE.   He is seen with spouse.  He is a marginal historian.     PERTINENT INTERVAL HISTORY:  The patient was seen by Dr. Reba Warner for a right upper lobe cavitary mass. The patient had asbestos exposure from the [employer] and previous work in a radiation plant. The cavitary lesion is amenable to navigational bronchoscopy through high risk for pneumothorax. Fungal markers were sent. Followup in 1 month.    Anemia profile 07/25/2019 revealed a folate of 10, B12 of 345, iron saturation 30%, TIBC 186, iron 56, and previous ferritin 988, 07/05/2019. He was given Procrit 40,000 units subcutaneously 07/29/2019.    The patient was admitted 07/25/2019 for pneumonia and discharged 07/29/2019.  His CBC on discharge 07/29/2019 revealed a WBC of 5.4, hemoglobin 9.6, hematocrit 29.5, MCV 95.8, platelet count 117,000 with ANC of 3.31.      Oncology/Hematology History    DIAGNOSTIC ABNORMALITIES:    CBC on 12/11/2015 [facility] revealed a WBC of 7.2, hemoglobin 10, hematocrit 31.2, , and platelet count 119,000 with a normal differential.   CBC on 04/14/2016 revealed a WBC of 5.7, hemoglobin 8.5, hematocrit 26.8, .7, and platelet count 119,000 with a normal differential.   Occult blood x3 was negative on 04/28/2016.   Endoscopy on 05/24/2016 by Dr. Ahmet Segovia revealed gastritis, " otherwise normal endoscopy. The patient considered for a colonoscopy secondary to weight loss.  Colonoscopy was negative 2011 per patient.  CBC on 06/09/2016 revealed a WBC of 5.8, hemoglobin 9, hematocrit 28.6, .4, and platelet count 142,000 with a normal differential.   Previous iron panel on 06/09/2016 revealed an iron saturation of 63, iron 163, TIBC 257, ferritin 122, and methylmalonic acid was normal at 284.  PATHOLOGY: [facility], comprehensive report, 08/16/2016. Findings consistent with refractory anemia with ringed sideroblasts; bone marrow, aspirate and biopsy: High-normocellular marrow (approximately 40-50% cellularity) with trilineage hematopoiesis showing a left shift in maturation and erythroid hyperplasia. Moderate dyserythropoiesis. Increased storage iron with frequent ringed sideroblasts (greater than 15%) Comprehensive comment:   The findings in the bone marrow could be compatible with a myelodysplastic syndrome, specifically refractory anemia with ringed sideroblasts. Other non-neoplastic causes of ringed sideroblasts must be excluded clinically including drug/toxin effect, zinc administration, copper deficiency, and alcohol. Clinical correlation and followup required. Cytogenetic studies reveal a normal male karyotype. FISH for common abnormalities associated with myelodysplastic syndrome is negative. ALIREZA F5007R and SF3B1 K700E mutations were detected by targeted next generation sequencing. No other mutations were detected by targeted next generation sequencing interrogating hot-spot regions of 85 genes. In particular, no mutations were detected in genes including KIT, NPM1, FLT3, CEBPA, CALR, DNMT3A, JAK2, IDH1 or IDH2. ALIREZA mutations have been described in a variety of hematologic malignancies including lymphomas and leukemias. Targeted therapies are available in a clinical context for mutant ALIREZA (i.e, PARP inhibitors). SF3B1 encodes for a protein involved in RNA spliceosome machinery and  mutations in this gene have been implicated in a number of hematologic neoplasms including CLL, MDS, MPN, overlap MDS/MPN and AML. In the setting of MDS, there was a significant association of SF3B1 mutations with the presence of ringed sideroblasts and of mutant allele burden with their proportion. In multivariate analysis including established risk factors, SF3B1 mutations were found to be independently associated with better overall survival and lower risk of evolution into AML. Cytogenomic array analysis revealed no significant gain, loss, or copy-neutral loss of heterozygosity on any chromosomal region. Cytogenetic Impression: Bone marrow, aspirate: Normal male karyotype 46,XY[20]. Flow Impression: Bone marrow, aspirate: Immunophenotypic abnormalities of myeloid blasts, maturing myeloid, and monocytic populations identified. No monoclonal B-cell population or immunophenotypically abnormal T-cell population identified.  No monoclonal plasma cell population identified. FISH Impression: Bone marrow, aspirate: Negative for the tested MDS-associated genetic abnormalities. Theranostic summary: ALIREZA Z1336J and SF3B1 K700E mutations were detected by targeted next generation sequencing. A NORMAL cytogenomic array result was detected.     PREVIOUS INTERVENTIONS:   Vitamin B12, 1000 mg IM 07/15/2016 through present at the [facility].   Injectafer 750 mg 09/28/2016, 12/01/2016, and 06/22/2018 at the [facility].    Injecatfer 750 mg 07/12/2019 at [facility].  He had no response to oral iron in the past.   Procrit 01/04/2017 through present at the [facility].  2 units PRBCs 01/10/2018 at [facility].        MDS (myelodysplastic syndrome) (CMS/HCC)    9/28/2017 Initial Diagnosis     MDS (myelodysplastic syndrome) (CMS/HCC)              Problem List Items Addressed This Visit     None          PAST MEDICAL HISTORY:  ALLERGIES:  No Known  Allergies  CURRENT MEDICATIONS:  Outpatient Encounter Medications as of 8/12/2019   Medication Sig Dispense Refill   • acetaminophen-codeine (TYLENOL #3) 300-30 MG per tablet Take 1 tablet by mouth 3 (Three) Times a Day As Needed for Moderate Pain .     • albuterol sulfate  (90 Base) MCG/ACT inhaler Inhale 2 puffs Every 4 (Four) Hours As Needed for Wheezing or Shortness of Air.     • aspirin 81 MG EC tablet Take 81 mg by mouth Daily.     • benzonatate (TESSALON) 200 MG capsule Take 1 capsule by mouth 3 (Three) Times a Day As Needed for Cough. 20 capsule 0   • epoetin brady (PROCRIT) 90277 UNIT/ML injection      • Fluticasone-Umeclidin-Vilant (TRELEGY ELLIPTA) 100-62.5-25 MCG/INH aerosol powder  Inhale 1 each Daily.     • levothyroxine (SYNTHROID, LEVOTHROID) 125 MCG tablet Take 125 mcg by mouth Daily.     • simvastatin (ZOCOR) 20 MG tablet Take 20 mg by mouth Daily.  2     No facility-administered encounter medications on file as of 8/12/2019.      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   • Anemia, macrocytic D53.9   • Pneumonia of right lower lobe due to infectious organism (CMS/HCC) J18.1   • Acute blood loss anemia D62   • Shortness of breath R06.02     SURGERIES:  Past Surgical History:   Procedure Laterality Date   • CATARACT EXTRACTION, BILATERAL     • CORONARY ARTERY BYPASS GRAFT  2005    x4   • EAR MASTOIDECTOMY W/ COCHLEAR IMPLANT W/ LANDMARK Right 2003   • THYROID SURGERY     • TOTAL THYROIDECTOMY  1974     HEALTH MAINTENANCE ITEMS:  Health Maintenance Due   Topic Date Due   • ZOSTER VACCINE (1 of 2) 04/03/1989   • PNEUMOCOCCAL VACCINES (65+ LOW/MEDIUM RISK) (2 of 2 - PPSV23) 12/18/2016   • MEDICARE ANNUAL  WELLNESS  2017   • DXA SCAN  2019   • INFLUENZA VACCINE  2019       <no information>  Last Completed Colonoscopy     Patient has no health maintenance due at this time        Immunization History   Administered Date(s) Administered   • Tdap 10/01/2018     Last Completed Mammogram     Patient has no health maintenance due at this time            FAMILY HISTORY:  Family History   Problem Relation Age of Onset   • Dementia Mother    • Heart failure Father    • Scoliosis Sister    • Dementia Brother    • Esophageal varices Son    • No Known Problems Brother    • Depression Son    • Suicidality Son    • Obesity Son      SOCIAL HISTORY:  Social History     Socioeconomic History   • Marital status:      Spouse name: Not on file   • Number of children: Not on file   • Years of education: Not on file   • Highest education level: Not on file   Tobacco Use   • Smoking status: Former Smoker     Last attempt to quit: 2015     Years since quittin.6   • Smokeless tobacco: Never Used   • Tobacco comment: uses vape   Substance and Sexual Activity   • Alcohol use: Yes     Alcohol/week: 2.4 - 3.6 oz     Types: 4 - 6 Shots of liquor per week     Comment: [email or web address]   • Drug use: No   • Sexual activity: Defer       REVIEW OF SYSTEMS:    Review of Systems   Constitutional: Positive for fatigue. Negative for chills and fever.   HENT: Negative for mouth sores, nosebleeds, sore throat and trouble swallowing.    Eyes: Negative for photophobia and visual disturbance.   Respiratory: Negative for cough and wheezing.         Short of breath with mild exertion.   Cardiovascular: Negative for chest pain and palpitations.   Gastrointestinal: Negative for abdominal pain, nausea and vomiting.   Genitourinary: Negative for dysuria and flank pain.   Skin: Positive for pallor.   Neurological: Negative for tremors, seizures and speech difficulty.   Hematological: Does not bruise/bleed easily.   Psychiatric/Behavioral: Negative  for agitation, behavioral problems and hallucinations.         VITAL SIGNS: There were no vitals taken for this visit.      PHYSICAL EXAMINATION:     Physical Exam   Constitutional: He is oriented to person, place, and time.   Frail looking. He arrived in the exam room in a wheelchair.   HENT:   Head: Normocephalic and atraumatic.   Eyes: Right eye exhibits no discharge. Left eye exhibits no discharge.   Neck: Neck supple. No JVD present.   Cardiovascular: Normal rate and regular rhythm.   Pulmonary/Chest: No respiratory distress. He has no wheezes. He has no rales.   Abdominal: Soft. Bowel sounds are normal. There is no tenderness.   Musculoskeletal: He exhibits no edema.   Neurological: He is alert and oriented to person, place, and time.   Skin: Skin is warm and dry. There is pallor.   Psychiatric: He has a normal mood and affect. His behavior is normal.   Vitals reviewed.      LABS   Hemoglobin   Date Value Ref Range Status   08/09/2019 11.0 (L) 13.0 - 17.7 g/dL Final   03/05/2019 8.8 (L) 14.0 - 18.0 g/dL Final     Hematocrit   Date Value Ref Range Status   08/09/2019 35.1 (L) 37.5 - 51.0 % Final   03/05/2019 29.4 (L) 42.0 - 52.0 % Final     MCV   Date Value Ref Range Status   08/09/2019 99.2 (H) 79.0 - 97.0 fL Final   03/05/2019 119.0 (H) 80.0 - 94.0 fL Final     WBC   Date Value Ref Range Status   08/09/2019 6.39 3.40 - 10.80 10*3/mm3 Final   03/05/2019 5.8 4.8 - 10.8 K/uL Final     RDW   Date Value Ref Range Status   08/09/2019 21.2 (H) 12.3 - 15.4 % Final   03/05/2019 25.9 (H) 11.5 - 14.5 % Final     MPV   Date Value Ref Range Status   08/02/2019 13.6 (H) 6.0 - 12.0 fL Final     Platelets   Date Value Ref Range Status   08/09/2019 125 (L) 140 - 450 10*3/mm3 Final   03/05/2019 124 (L) 130 - 400 K/uL Final     Immature Grans %   Date Value Ref Range Status   07/29/2019 0.9 0.0 - 5.0 % Final     Neutrophils, Absolute   Date Value Ref Range Status   08/09/2019 4.34 1.70 - 7.00 10*3/mm3 Final     Lymphocytes,  Absolute   Date Value Ref Range Status   08/09/2019 1.28 0.70 - 3.10 10*3/mm3 Final     Monocytes, Absolute   Date Value Ref Range Status   08/09/2019 0.61 0.10 - 0.90 10*3/mm3 Final     Eosinophils, Absolute   Date Value Ref Range Status   08/09/2019 0.08 0.00 - 0.40 10*3/mm3 Final     Basophils, Absolute   Date Value Ref Range Status   08/09/2019 0.04 0.00 - 0.20 10*3/mm3 Final     Immature Grans, Absolute   Date Value Ref Range Status   07/29/2019 0.05 0.00 - 0.05 10*3/mm3 Final     nRBC   Date Value Ref Range Status   07/29/2019 0.5 (H) 0.0 - 0.2 /100 WBC Final       Glucose   Date Value Ref Range Status   08/09/2019 113 (H) 70 - 100 mg/dL Final   08/22/2018 91 74 - 109 mg/dL Final     Sodium   Date Value Ref Range Status   08/09/2019 138 135 - 145 mmol/L Final   08/22/2018 139 136 - 145 mmol/L Final     Potassium   Date Value Ref Range Status   08/09/2019 4.1 3.5 - 5.3 mmol/L Final   08/22/2018 3.8 3.5 - 5.0 mmol/L Final     CO2   Date Value Ref Range Status   08/09/2019 30.0 24.0 - 31.0 mmol/L Final   08/22/2018 24 22 - 29 mmol/L Final     Chloride   Date Value Ref Range Status   08/09/2019 101 98 - 110 mmol/L Final   08/22/2018 101 98 - 111 mmol/L Final     Anion Gap   Date Value Ref Range Status   08/09/2019 7.0 4.0 - 13.0 mmol/L Final   08/22/2018 14 7 - 19 mmol/L Final     Creatinine   Date Value Ref Range Status   08/09/2019 1.02 0.50 - 1.40 mg/dL Final   11/21/2018 1.20 0.60 - 1.30 mg/dL Final     Comment:     Serial Number: [id number]:  [id number]   08/22/2018 0.9 0.5 - 1.2 mg/dL Final     BUN   Date Value Ref Range Status   08/09/2019 18 5 - 21 mg/dL Final   08/22/2018 17 8 - 23 mg/dL Final     BUN/Creatinine Ratio   Date Value Ref Range Status   08/09/2019 17.6 7.0 - 25.0 Final     Calcium   Date Value Ref Range Status   08/09/2019 9.4 8.4 - 10.4 mg/dL Final   08/22/2018 8.6 (L) 8.8 - 10.2 mg/dL Final     eGFR Non  Am   Date Value Ref Range Status   08/22/2018 >60 >60 Final     Comment:      This calculation may be inaccurate for patients under the age of 18 years.  For ages 18 and older, a GFR >60 mL/min/1.73m2 (not corrected for weight) is  valid for stable renal function.     eGFR Non  Amer   Date Value Ref Range Status   08/09/2019 70 >60 mL/min/1.73 Final     Alkaline Phosphatase   Date Value Ref Range Status   08/09/2019 271 (H) 24 - 120 U/L Final   08/22/2018 130 40 - 130 U/L Final     Total Protein   Date Value Ref Range Status   08/09/2019 8.0 6.3 - 8.7 g/dL Final   08/22/2018 6.9 6.6 - 8.7 g/dL Final     ALT (SGPT)   Date Value Ref Range Status   08/09/2019 109 (H) 0 - 54 U/L Final   08/22/2018 11 5 - 41 U/L Final     AST (SGOT)   Date Value Ref Range Status   08/09/2019 95 (H) 7 - 45 U/L Final   08/22/2018 16 5 - 40 U/L Final     Total Bilirubin   Date Value Ref Range Status   08/09/2019 1.1 (H) 0.1 - 1.0 mg/dL Final   08/22/2018 0.6 0.2 - 1.2 mg/dL Final     Albumin   Date Value Ref Range Status   08/09/2019 3.80 3.50 - 5.00 g/dL Final   08/22/2018 3.9 3.5 - 5.2 g/dL Final     Globulin   Date Value Ref Range Status   08/09/2019 4.2 gm/dL Final                Miguel KARTHIK Laya reports a pain score of 0.  Given his pain assessment as noted, treatment options were discussed and the following options were decided upon as a follow-up plan to address the patient's pain:     ASSESSMENT:  1.    Anemia, macrocytic, from myelodysplasia (refractory anemia with ringed sideroblasts), iron deficiency, and B12 deficiency.   2.    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   3.    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   Followed at [place].   4.    Possible confluent lymphadenopathy in the celiac axis, measuring 1.8 cm transversely and 3.5 cm longitudinally. Followed at [place].   5.    10 mm nodule in the right adrenal gland   6.    Thrombocytopenia from myelodysplasia.   7.    Weight loss.    8.    Chronic obstructive pulmonary disease.  9.    Coronary artery disease.  10.   Hypertension.   11.  Elevated transaminases from cholelithiasis 07/26/2019. .      PLAN:  1.     Re:  Events from hospitalization.   2.     Re:  Heme status.  Hemoglobin 11 gm, ANC 4.3, and platelet 125.   3.     Re:  Pre-office CMP.   GFR 70 ml/minute. Alkaline phosphatase 271, bilirubin 1.1, AST 95 and .   4.     Re:  Pre-office ferritin, TIBC, % saturation and iron. 30% saturation and ferritin 3,635 ng/ml.  He had Injectafer 07/2019.   5.     Re:  Continue Procrit.  Observe for adverse events or intolerance.    6.     again Re:  Myelodysplastic syndrome and role of hypomethylating agents and role of Procrit.  7.    Procrit 40,000 units subcutaneously weekly if hemoglobin less than 12 and hematocrit less than 36% if ferritin at least 100 ng/mL and % saturation at least 20% (myelodysplasia).  Hold chemo like Dacogen, no other significant cytopenias and not requiring transfusions.   Observe for adverse reactions or intolerance.   8.    CBC with differential every week.  9.    Ferritin, TIBC, % saturation, and iron every 4 weeks.  10.  Continue currently identified medications.  11.  Continue ongoing management per primary care physician and other specialists.  12.  Plan of care discussed with patient.  Understanding expressed.  Patient agreeable to proceed.  13.  Transfuse 2 units PRBCs if hemoglobin below 7.  Premed Tylenol 500 mg p.o. and Benadryl 25 mg IV.  Lasix 20 mg IVP after each unit of PRBC.   14. Return to the office in 3 months with pre-office CMP.   15.  For the use of ESAs:   the patient about the appropriate use of ESAs for patients with cancer, MDS, and CKD.  Acknowledge that the MADELAINE risk: benefit discussion occurred using the MADELAINE [facility] Oncology Program Patient and Healthcare Professional (HCP) Acknowledgement Form.  Assure signature on the form.  Acknowledge government oversight and intervention in the care of the individual patient.    TIME  SPENT:  Face-to-face time on this encounter, as defined by the American Medical Association in the 2019 Current Procedural Terminology codebook; assessment, record review, lab review, planning and education is 37 minutes.

## 2019-08-12 ENCOUNTER — READMISSION MANAGEMENT (OUTPATIENT)
Dept: CALL CENTER | Facility: HOSPITAL | Age: 79
End: 2019-08-12

## 2019-08-12 ENCOUNTER — OFFICE VISIT (OUTPATIENT)
Dept: ONCOLOGY | Facility: CLINIC | Age: 79
End: 2019-08-12

## 2019-08-12 VITALS
OXYGEN SATURATION: 95 % | HEART RATE: 85 BPM | TEMPERATURE: 98.2 F | SYSTOLIC BLOOD PRESSURE: 118 MMHG | HEIGHT: 71 IN | DIASTOLIC BLOOD PRESSURE: 62 MMHG | BODY MASS INDEX: 21.42 KG/M2 | WEIGHT: 153 LBS | RESPIRATION RATE: 18 BRPM

## 2019-08-12 DIAGNOSIS — D46.9 MDS (MYELODYSPLASTIC SYNDROME) (HCC): Primary | ICD-10-CM

## 2019-08-12 PROCEDURE — 99214 OFFICE O/P EST MOD 30 MIN: CPT | Performed by: INTERNAL MEDICINE

## 2019-08-12 NOTE — OUTREACH NOTE
COPD/PN Week 2 Survey      Responses   Facility patient discharged from?  [facility]   Does the patient have one of the following disease processes/diagnoses(primary or secondary)?  COPD/Pneumonia   Was the primary reason for admission:  Pneumonia   Week 2 attempt successful?  No   Rescheduled  Revoked          Jeremiah Salguero RN

## 2019-08-16 ENCOUNTER — LAB (OUTPATIENT)
Dept: LAB | Facility: HOSPITAL | Age: 79
End: 2019-08-16

## 2019-08-16 ENCOUNTER — INFUSION (OUTPATIENT)
Dept: ONCOLOGY | Facility: HOSPITAL | Age: 79
End: 2019-08-16

## 2019-08-16 VITALS
OXYGEN SATURATION: 98 % | HEART RATE: 81 BPM | BODY MASS INDEX: 21.7 KG/M2 | DIASTOLIC BLOOD PRESSURE: 64 MMHG | RESPIRATION RATE: 18 BRPM | HEIGHT: 71 IN | WEIGHT: 155 LBS | SYSTOLIC BLOOD PRESSURE: 138 MMHG | TEMPERATURE: 97.9 F

## 2019-08-16 DIAGNOSIS — D46.9 MDS (MYELODYSPLASTIC SYNDROME) (HCC): ICD-10-CM

## 2019-08-16 DIAGNOSIS — D46.9 MDS (MYELODYSPLASTIC SYNDROME) (HCC): Primary | ICD-10-CM

## 2019-08-16 LAB
ALBUMIN SERPL-MCNC: 3.5 G/DL (ref 3.5–5)
ALBUMIN/GLOB SERPL: 0.9 G/DL (ref 1.1–2.5)
ALP SERPL-CCNC: 171 U/L (ref 24–120)
ALT SERPL W P-5'-P-CCNC: 61 U/L (ref 0–54)
ANION GAP SERPL CALCULATED.3IONS-SCNC: 6 MMOL/L (ref 4–13)
AST SERPL-CCNC: 61 U/L (ref 7–45)
BASOPHILS # BLD AUTO: 0.03 10*3/MM3 (ref 0–0.2)
BASOPHILS NFR BLD AUTO: 0.5 % (ref 0–1.5)
BILIRUB SERPL-MCNC: 0.8 MG/DL (ref 0.1–1)
BUN BLD-MCNC: 21 MG/DL (ref 5–21)
BUN/CREAT SERPL: 21.4 (ref 7–25)
CALCIUM SPEC-SCNC: 8.8 MG/DL (ref 8.4–10.4)
CHLORIDE SERPL-SCNC: 103 MMOL/L (ref 98–110)
CO2 SERPL-SCNC: 29 MMOL/L (ref 24–31)
CREAT BLD-MCNC: 0.98 MG/DL (ref 0.5–1.4)
DEPRECATED RDW RBC AUTO: 75.3 FL (ref 37–54)
EOSINOPHIL # BLD AUTO: 0.15 10*3/MM3 (ref 0–0.4)
EOSINOPHIL NFR BLD AUTO: 2.7 % (ref 0.3–6.2)
ERYTHROCYTE [DISTWIDTH] IN BLOOD BY AUTOMATED COUNT: 22 % (ref 12.3–15.4)
FERRITIN SERPL-MCNC: 2100 NG/ML (ref 17.9–464)
GFR SERPL CREATININE-BSD FRML MDRD: 74 ML/MIN/1.73
GLOBULIN UR ELPH-MCNC: 3.8 GM/DL
GLUCOSE BLD-MCNC: 83 MG/DL (ref 70–100)
HCT VFR BLD AUTO: 31.7 % (ref 37.5–51)
HGB BLD-MCNC: 10 G/DL (ref 13–17.7)
IMM GRANULOCYTES # BLD AUTO: 0.04 10*3/MM3 (ref 0–0.05)
IMM GRANULOCYTES NFR BLD AUTO: 0.7 % (ref 0–0.5)
IRON 24H UR-MRATE: 78 MCG/DL (ref 42–180)
IRON SATN MFR SERPL: 37 % (ref 20–45)
LYMPHOCYTES # BLD AUTO: 1.08 10*3/MM3 (ref 0.7–3.1)
LYMPHOCYTES NFR BLD AUTO: 19.6 % (ref 19.6–45.3)
MCH RBC QN AUTO: 31.3 PG (ref 26.6–33)
MCHC RBC AUTO-ENTMCNC: 31.5 G/DL (ref 31.5–35.7)
MCV RBC AUTO: 99.1 FL (ref 79–97)
MONOCYTES # BLD AUTO: 0.5 10*3/MM3 (ref 0.1–0.9)
MONOCYTES NFR BLD AUTO: 9.1 % (ref 5–12)
NEUTROPHILS # BLD AUTO: 3.71 10*3/MM3 (ref 1.7–7)
NEUTROPHILS NFR BLD AUTO: 67.4 % (ref 42.7–76)
NRBC BLD AUTO-RTO: 0.4 /100 WBC (ref 0–0.2)
PLATELET # BLD AUTO: 89 10*3/MM3 (ref 140–450)
POTASSIUM BLD-SCNC: 3.6 MMOL/L (ref 3.5–5.3)
PROT SERPL-MCNC: 7.3 G/DL (ref 6.3–8.7)
RBC # BLD AUTO: 3.2 10*6/MM3 (ref 4.14–5.8)
SODIUM BLD-SCNC: 138 MMOL/L (ref 135–145)
TIBC SERPL-MCNC: 211 MCG/DL (ref 225–420)
WBC NRBC COR # BLD: 5.51 10*3/MM3 (ref 3.4–10.8)

## 2019-08-16 PROCEDURE — 36415 COLL VENOUS BLD VENIPUNCTURE: CPT

## 2019-08-16 PROCEDURE — 82728 ASSAY OF FERRITIN: CPT | Performed by: INTERNAL MEDICINE

## 2019-08-16 PROCEDURE — 80053 COMPREHEN METABOLIC PANEL: CPT | Performed by: INTERNAL MEDICINE

## 2019-08-16 PROCEDURE — 83550 IRON BINDING TEST: CPT | Performed by: INTERNAL MEDICINE

## 2019-08-16 PROCEDURE — 85025 COMPLETE CBC W/AUTO DIFF WBC: CPT | Performed by: INTERNAL MEDICINE

## 2019-08-16 PROCEDURE — 96372 THER/PROPH/DIAG INJ SC/IM: CPT

## 2019-08-16 PROCEDURE — 83540 ASSAY OF IRON: CPT | Performed by: INTERNAL MEDICINE

## 2019-08-16 PROCEDURE — 25010000002 EPOETIN ALFA PER 1000 UNITS: Performed by: INTERNAL MEDICINE

## 2019-08-16 RX ADMIN — ERYTHROPOIETIN 40000 UNITS: 40000 INJECTION, SOLUTION INTRAVENOUS; SUBCUTANEOUS at 12:20

## 2019-08-23 ENCOUNTER — INFUSION (OUTPATIENT)
Dept: ONCOLOGY | Facility: HOSPITAL | Age: 79
End: 2019-08-23

## 2019-08-23 ENCOUNTER — LAB (OUTPATIENT)
Dept: LAB | Facility: HOSPITAL | Age: 79
End: 2019-08-23

## 2019-08-23 VITALS
RESPIRATION RATE: 20 BRPM | DIASTOLIC BLOOD PRESSURE: 63 MMHG | OXYGEN SATURATION: 96 % | BODY MASS INDEX: 21.98 KG/M2 | HEART RATE: 81 BPM | TEMPERATURE: 97.8 F | SYSTOLIC BLOOD PRESSURE: 140 MMHG | WEIGHT: 157 LBS | HEIGHT: 71 IN

## 2019-08-23 DIAGNOSIS — D46.9 MDS (MYELODYSPLASTIC SYNDROME) (HCC): ICD-10-CM

## 2019-08-23 DIAGNOSIS — D46.9 MDS (MYELODYSPLASTIC SYNDROME) (HCC): Primary | ICD-10-CM

## 2019-08-23 DIAGNOSIS — D64.9 ANEMIA, UNSPECIFIED TYPE: ICD-10-CM

## 2019-08-23 DIAGNOSIS — D50.9 IRON DEFICIENCY ANEMIA, UNSPECIFIED IRON DEFICIENCY ANEMIA TYPE: ICD-10-CM

## 2019-08-23 DIAGNOSIS — D46.9 MYELODYSPLASTIC SYNDROME (HCC): ICD-10-CM

## 2019-08-23 LAB
ALBUMIN SERPL-MCNC: 3.7 G/DL (ref 3.5–5)
ALBUMIN/GLOB SERPL: 0.9 G/DL (ref 1.1–2.5)
ALP SERPL-CCNC: 178 U/L (ref 24–120)
ALT SERPL W P-5'-P-CCNC: 36 U/L (ref 0–54)
ANION GAP SERPL CALCULATED.3IONS-SCNC: 4 MMOL/L (ref 4–13)
AST SERPL-CCNC: 50 U/L (ref 7–45)
BASOPHILS # BLD AUTO: 0.03 10*3/MM3 (ref 0–0.2)
BASOPHILS NFR BLD AUTO: 0.5 % (ref 0–1.5)
BILIRUB SERPL-MCNC: 1 MG/DL (ref 0.1–1)
BUN BLD-MCNC: 20 MG/DL (ref 5–21)
BUN/CREAT SERPL: 18.3 (ref 7–25)
CALCIUM SPEC-SCNC: 9.2 MG/DL (ref 8.4–10.4)
CHLORIDE SERPL-SCNC: 103 MMOL/L (ref 98–110)
CO2 SERPL-SCNC: 31 MMOL/L (ref 24–31)
CREAT BLD-MCNC: 1.09 MG/DL (ref 0.5–1.4)
DEPRECATED RDW RBC AUTO: 78.8 FL (ref 37–54)
EOSINOPHIL # BLD AUTO: 0.13 10*3/MM3 (ref 0–0.4)
EOSINOPHIL NFR BLD AUTO: 2.1 % (ref 0.3–6.2)
ERYTHROCYTE [DISTWIDTH] IN BLOOD BY AUTOMATED COUNT: 23.4 % (ref 12.3–15.4)
FERRITIN SERPL-MCNC: 2050 NG/ML (ref 17.9–464)
GFR SERPL CREATININE-BSD FRML MDRD: 65 ML/MIN/1.73
GLOBULIN UR ELPH-MCNC: 3.9 GM/DL
GLUCOSE BLD-MCNC: 123 MG/DL (ref 70–100)
HCT VFR BLD AUTO: 31.7 % (ref 37.5–51)
HGB BLD-MCNC: 9.8 G/DL (ref 13–17.7)
IRON 24H UR-MRATE: 108 MCG/DL (ref 42–180)
IRON 24H UR-MRATE: 93 MCG/DL (ref 42–180)
IRON SATN MFR SERPL: 43 % (ref 20–45)
IRON SATN MFR SERPL: 49 % (ref 20–45)
LYMPHOCYTES # BLD AUTO: 1.41 10*3/MM3 (ref 0.7–3.1)
LYMPHOCYTES NFR BLD AUTO: 22.6 % (ref 19.6–45.3)
MCH RBC QN AUTO: 31.2 PG (ref 26.6–33)
MCHC RBC AUTO-ENTMCNC: 30.9 G/DL (ref 31.5–35.7)
MCV RBC AUTO: 101 FL (ref 79–97)
MONOCYTES # BLD AUTO: 0.44 10*3/MM3 (ref 0.1–0.9)
MONOCYTES NFR BLD AUTO: 7 % (ref 5–12)
NEUTROPHILS # BLD AUTO: 4.2 10*3/MM3 (ref 1.7–7)
NEUTROPHILS NFR BLD AUTO: 67.2 % (ref 42.7–76)
PLATELET # BLD AUTO: 95 10*3/MM3 (ref 140–450)
PMV BLD AUTO: ABNORMAL FL
POTASSIUM BLD-SCNC: 5 MMOL/L (ref 3.5–5.3)
PROT SERPL-MCNC: 7.6 G/DL (ref 6.3–8.7)
RBC # BLD AUTO: 3.14 10*6/MM3 (ref 4.14–5.8)
SODIUM BLD-SCNC: 138 MMOL/L (ref 135–145)
TIBC SERPL-MCNC: 215 MCG/DL (ref 225–420)
TIBC SERPL-MCNC: 219 MCG/DL (ref 225–420)
WBC NRBC COR # BLD: 6.25 10*3/MM3 (ref 3.4–10.8)

## 2019-08-23 PROCEDURE — 83540 ASSAY OF IRON: CPT | Performed by: INTERNAL MEDICINE

## 2019-08-23 PROCEDURE — 83550 IRON BINDING TEST: CPT | Performed by: INTERNAL MEDICINE

## 2019-08-23 PROCEDURE — 85025 COMPLETE CBC W/AUTO DIFF WBC: CPT | Performed by: INTERNAL MEDICINE

## 2019-08-23 PROCEDURE — 36415 COLL VENOUS BLD VENIPUNCTURE: CPT

## 2019-08-23 PROCEDURE — 96372 THER/PROPH/DIAG INJ SC/IM: CPT

## 2019-08-23 PROCEDURE — 80053 COMPREHEN METABOLIC PANEL: CPT | Performed by: INTERNAL MEDICINE

## 2019-08-23 PROCEDURE — 82728 ASSAY OF FERRITIN: CPT | Performed by: INTERNAL MEDICINE

## 2019-08-23 PROCEDURE — 25010000002 EPOETIN ALFA PER 1000 UNITS: Performed by: INTERNAL MEDICINE

## 2019-08-23 RX ADMIN — ERYTHROPOIETIN 40000 UNITS: 40000 INJECTION, SOLUTION INTRAVENOUS; SUBCUTANEOUS at 12:45

## 2019-08-30 ENCOUNTER — INFUSION (OUTPATIENT)
Dept: ONCOLOGY | Facility: HOSPITAL | Age: 79
End: 2019-08-30

## 2019-08-30 ENCOUNTER — LAB (OUTPATIENT)
Dept: LAB | Facility: HOSPITAL | Age: 79
End: 2019-08-30

## 2019-08-30 VITALS
SYSTOLIC BLOOD PRESSURE: 131 MMHG | TEMPERATURE: 98.1 F | DIASTOLIC BLOOD PRESSURE: 61 MMHG | HEIGHT: 71 IN | RESPIRATION RATE: 18 BRPM | WEIGHT: 157 LBS | BODY MASS INDEX: 21.98 KG/M2 | HEART RATE: 89 BPM | OXYGEN SATURATION: 96 %

## 2019-08-30 DIAGNOSIS — D46.9 MDS (MYELODYSPLASTIC SYNDROME) (HCC): Primary | ICD-10-CM

## 2019-08-30 LAB
BASOPHILS # BLD AUTO: 0.03 10*3/MM3 (ref 0–0.2)
BASOPHILS NFR BLD AUTO: 0.5 % (ref 0–1.5)
DEPRECATED RDW RBC AUTO: 84.8 FL (ref 37–54)
EOSINOPHIL # BLD AUTO: 0.16 10*3/MM3 (ref 0–0.4)
EOSINOPHIL NFR BLD AUTO: 2.6 % (ref 0.3–6.2)
ERYTHROCYTE [DISTWIDTH] IN BLOOD BY AUTOMATED COUNT: 24.5 % (ref 12.3–15.4)
HCT VFR BLD AUTO: 29.7 % (ref 37.5–51)
HGB BLD-MCNC: 9.3 G/DL (ref 13–17.7)
HOLD SPECIMEN: NORMAL
IMM GRANULOCYTES # BLD AUTO: 0.03 10*3/MM3 (ref 0–0.05)
IMM GRANULOCYTES NFR BLD AUTO: 0.5 % (ref 0–0.5)
LYMPHOCYTES # BLD AUTO: 1.68 10*3/MM3 (ref 0.7–3.1)
LYMPHOCYTES NFR BLD AUTO: 26.9 % (ref 19.6–45.3)
MCH RBC QN AUTO: 31.5 PG (ref 26.6–33)
MCHC RBC AUTO-ENTMCNC: 31.3 G/DL (ref 31.5–35.7)
MCV RBC AUTO: 100.7 FL (ref 79–97)
MONOCYTES # BLD AUTO: 0.56 10*3/MM3 (ref 0.1–0.9)
MONOCYTES NFR BLD AUTO: 9 % (ref 5–12)
NEUTROPHILS # BLD AUTO: 3.78 10*3/MM3 (ref 1.7–7)
NEUTROPHILS NFR BLD AUTO: 60.5 % (ref 42.7–76)
NRBC BLD AUTO-RTO: 0.5 /100 WBC (ref 0–0.2)
PLATELET # BLD AUTO: 95 10*3/MM3 (ref 140–450)
PMV BLD AUTO: ABNORMAL FL
RBC # BLD AUTO: 2.95 10*6/MM3 (ref 4.14–5.8)
WBC NRBC COR # BLD: 6.24 10*3/MM3 (ref 3.4–10.8)

## 2019-08-30 PROCEDURE — 36415 COLL VENOUS BLD VENIPUNCTURE: CPT

## 2019-08-30 PROCEDURE — 25010000002 EPOETIN ALFA PER 1000 UNITS: Performed by: INTERNAL MEDICINE

## 2019-08-30 PROCEDURE — 96372 THER/PROPH/DIAG INJ SC/IM: CPT

## 2019-08-30 PROCEDURE — 85025 COMPLETE CBC W/AUTO DIFF WBC: CPT | Performed by: INTERNAL MEDICINE

## 2019-08-30 RX ADMIN — ERYTHROPOIETIN 40000 UNITS: 40000 INJECTION, SOLUTION INTRAVENOUS; SUBCUTANEOUS at 12:08

## 2019-09-06 ENCOUNTER — INFUSION (OUTPATIENT)
Dept: ONCOLOGY | Facility: HOSPITAL | Age: 79
End: 2019-09-06

## 2019-09-06 ENCOUNTER — TELEPHONE (OUTPATIENT)
Dept: ONCOLOGY | Facility: CLINIC | Age: 79
End: 2019-09-06

## 2019-09-06 ENCOUNTER — LAB (OUTPATIENT)
Dept: LAB | Facility: HOSPITAL | Age: 79
End: 2019-09-06

## 2019-09-06 VITALS
OXYGEN SATURATION: 93 % | TEMPERATURE: 98.6 F | WEIGHT: 155 LBS | HEIGHT: 71 IN | HEART RATE: 84 BPM | DIASTOLIC BLOOD PRESSURE: 57 MMHG | RESPIRATION RATE: 20 BRPM | SYSTOLIC BLOOD PRESSURE: 137 MMHG | BODY MASS INDEX: 21.7 KG/M2

## 2019-09-06 DIAGNOSIS — D46.9 MDS (MYELODYSPLASTIC SYNDROME) (HCC): Primary | ICD-10-CM

## 2019-09-06 DIAGNOSIS — D64.9 ANEMIA, UNSPECIFIED TYPE: ICD-10-CM

## 2019-09-06 DIAGNOSIS — D46.9 MYELODYSPLASTIC SYNDROME (HCC): ICD-10-CM

## 2019-09-06 DIAGNOSIS — D50.9 IRON DEFICIENCY ANEMIA, UNSPECIFIED IRON DEFICIENCY ANEMIA TYPE: ICD-10-CM

## 2019-09-06 DIAGNOSIS — R79.89 ELEVATED LIVER FUNCTION TESTS: Primary | ICD-10-CM

## 2019-09-06 DIAGNOSIS — D46.9 MDS (MYELODYSPLASTIC SYNDROME) (HCC): ICD-10-CM

## 2019-09-06 LAB
ALBUMIN SERPL-MCNC: 3.9 G/DL (ref 3.5–5)
ALBUMIN/GLOB SERPL: 1 G/DL (ref 1.1–2.5)
ALP SERPL-CCNC: 189 U/L (ref 24–120)
ALT SERPL W P-5'-P-CCNC: 30 U/L (ref 0–54)
ANION GAP SERPL CALCULATED.3IONS-SCNC: 9 MMOL/L (ref 4–13)
AST SERPL-CCNC: 53 U/L (ref 7–45)
BASOPHILS # BLD AUTO: 0.03 10*3/MM3 (ref 0–0.2)
BASOPHILS NFR BLD AUTO: 0.5 % (ref 0–1.5)
BILIRUB SERPL-MCNC: 1.6 MG/DL (ref 0.1–1)
BUN BLD-MCNC: 22 MG/DL (ref 5–21)
BUN/CREAT SERPL: 20 (ref 7–25)
CALCIUM SPEC-SCNC: 9.1 MG/DL (ref 8.4–10.4)
CHLORIDE SERPL-SCNC: 100 MMOL/L (ref 98–110)
CO2 SERPL-SCNC: 29 MMOL/L (ref 24–31)
CREAT BLD-MCNC: 1.1 MG/DL (ref 0.5–1.4)
DEPRECATED RDW RBC AUTO: 92.8 FL (ref 37–54)
EOSINOPHIL # BLD AUTO: 0.29 10*3/MM3 (ref 0–0.4)
EOSINOPHIL NFR BLD AUTO: 4.8 % (ref 0.3–6.2)
ERYTHROCYTE [DISTWIDTH] IN BLOOD BY AUTOMATED COUNT: 26.3 % (ref 12.3–15.4)
FERRITIN SERPL-MCNC: 2080 NG/ML (ref 17.9–464)
GFR SERPL CREATININE-BSD FRML MDRD: 64 ML/MIN/1.73
GLOBULIN UR ELPH-MCNC: 4.1 GM/DL
GLUCOSE BLD-MCNC: 107 MG/DL (ref 70–100)
HCT VFR BLD AUTO: 28.4 % (ref 37.5–51)
HGB BLD-MCNC: 8.9 G/DL (ref 13–17.7)
IRON 24H UR-MRATE: 53 MCG/DL (ref 42–180)
IRON SATN MFR SERPL: 25 % (ref 20–45)
LYMPHOCYTES # BLD AUTO: 0.96 10*3/MM3 (ref 0.7–3.1)
LYMPHOCYTES NFR BLD AUTO: 15.9 % (ref 19.6–45.3)
MCH RBC QN AUTO: 32.2 PG (ref 26.6–33)
MCHC RBC AUTO-ENTMCNC: 31.3 G/DL (ref 31.5–35.7)
MCV RBC AUTO: 102.9 FL (ref 79–97)
MONOCYTES # BLD AUTO: 0.7 10*3/MM3 (ref 0.1–0.9)
MONOCYTES NFR BLD AUTO: 11.6 % (ref 5–12)
NEUTROPHILS # BLD AUTO: 4.01 10*3/MM3 (ref 1.7–7)
NEUTROPHILS NFR BLD AUTO: 66.2 % (ref 42.7–76)
PLATELET # BLD AUTO: 121 10*3/MM3 (ref 140–450)
PMV BLD AUTO: 13.4 FL (ref 6–12)
POTASSIUM BLD-SCNC: 4.2 MMOL/L (ref 3.5–5.3)
PROT SERPL-MCNC: 8 G/DL (ref 6.3–8.7)
RBC # BLD AUTO: 2.76 10*6/MM3 (ref 4.14–5.8)
SODIUM BLD-SCNC: 138 MMOL/L (ref 135–145)
TIBC SERPL-MCNC: 215 MCG/DL (ref 225–420)
WBC NRBC COR # BLD: 6.05 10*3/MM3 (ref 3.4–10.8)

## 2019-09-06 PROCEDURE — 83550 IRON BINDING TEST: CPT | Performed by: INTERNAL MEDICINE

## 2019-09-06 PROCEDURE — 25010000002 EPOETIN ALFA PER 1000 UNITS: Performed by: INTERNAL MEDICINE

## 2019-09-06 PROCEDURE — 80053 COMPREHEN METABOLIC PANEL: CPT | Performed by: INTERNAL MEDICINE

## 2019-09-06 PROCEDURE — 85025 COMPLETE CBC W/AUTO DIFF WBC: CPT | Performed by: INTERNAL MEDICINE

## 2019-09-06 PROCEDURE — 36415 COLL VENOUS BLD VENIPUNCTURE: CPT

## 2019-09-06 PROCEDURE — 83540 ASSAY OF IRON: CPT | Performed by: INTERNAL MEDICINE

## 2019-09-06 PROCEDURE — 96372 THER/PROPH/DIAG INJ SC/IM: CPT

## 2019-09-06 PROCEDURE — 82728 ASSAY OF FERRITIN: CPT | Performed by: INTERNAL MEDICINE

## 2019-09-06 RX ADMIN — ERYTHROPOIETIN 40000 UNITS: 40000 INJECTION, SOLUTION INTRAVENOUS; SUBCUTANEOUS at 12:29

## 2019-09-06 NOTE — TELEPHONE ENCOUNTER
----- Message from Bossman Martinez MD sent at 9/6/2019  1:02 PM CDT -----  TB 1.6. Order sonogram of the abdomen.

## 2019-09-12 NOTE — TELEPHONE ENCOUNTER
Spoke with patient and I let him know of the high billirubin and the need for the u/s of the abdomen. He agrees. I let him know that he would be called with an appointment.

## 2019-09-13 ENCOUNTER — LAB (OUTPATIENT)
Dept: LAB | Facility: HOSPITAL | Age: 79
End: 2019-09-13

## 2019-09-13 ENCOUNTER — INFUSION (OUTPATIENT)
Dept: ONCOLOGY | Facility: HOSPITAL | Age: 79
End: 2019-09-13

## 2019-09-13 VITALS
OXYGEN SATURATION: 97 % | HEIGHT: 71 IN | WEIGHT: 157 LBS | DIASTOLIC BLOOD PRESSURE: 62 MMHG | TEMPERATURE: 98.4 F | SYSTOLIC BLOOD PRESSURE: 136 MMHG | HEART RATE: 79 BPM | BODY MASS INDEX: 21.98 KG/M2 | RESPIRATION RATE: 20 BRPM

## 2019-09-13 DIAGNOSIS — D46.9 MDS (MYELODYSPLASTIC SYNDROME) (HCC): ICD-10-CM

## 2019-09-13 DIAGNOSIS — D46.9 MDS (MYELODYSPLASTIC SYNDROME) (HCC): Primary | ICD-10-CM

## 2019-09-13 LAB
BASOPHILS # BLD AUTO: 0.03 10*3/MM3 (ref 0–0.2)
BASOPHILS NFR BLD AUTO: 0.6 % (ref 0–1.5)
DEPRECATED RDW RBC AUTO: 101.4 FL (ref 37–54)
EOSINOPHIL # BLD AUTO: 0.2 10*3/MM3 (ref 0–0.4)
EOSINOPHIL NFR BLD AUTO: 3.8 % (ref 0.3–6.2)
ERYTHROCYTE [DISTWIDTH] IN BLOOD BY AUTOMATED COUNT: 27.5 % (ref 12.3–15.4)
HCT VFR BLD AUTO: 30.4 % (ref 37.5–51)
HGB BLD-MCNC: 9.5 G/DL (ref 13–17.7)
LYMPHOCYTES # BLD AUTO: 1.17 10*3/MM3 (ref 0.7–3.1)
LYMPHOCYTES NFR BLD AUTO: 22.5 % (ref 19.6–45.3)
MCH RBC QN AUTO: 33.1 PG (ref 26.6–33)
MCHC RBC AUTO-ENTMCNC: 31.3 G/DL (ref 31.5–35.7)
MCV RBC AUTO: 105.9 FL (ref 79–97)
MONOCYTES # BLD AUTO: 0.46 10*3/MM3 (ref 0.1–0.9)
MONOCYTES NFR BLD AUTO: 8.8 % (ref 5–12)
NEUTROPHILS # BLD AUTO: 3.3 10*3/MM3 (ref 1.7–7)
NEUTROPHILS NFR BLD AUTO: 63.3 % (ref 42.7–76)
PLATELET # BLD AUTO: 169 10*3/MM3 (ref 140–450)
PMV BLD AUTO: 13 FL (ref 6–12)
RBC # BLD AUTO: 2.87 10*6/MM3 (ref 4.14–5.8)
WBC NRBC COR # BLD: 5.21 10*3/MM3 (ref 3.4–10.8)

## 2019-09-13 PROCEDURE — 25010000002 EPOETIN ALFA PER 1000 UNITS: Performed by: INTERNAL MEDICINE

## 2019-09-13 PROCEDURE — 96372 THER/PROPH/DIAG INJ SC/IM: CPT

## 2019-09-13 PROCEDURE — 85025 COMPLETE CBC W/AUTO DIFF WBC: CPT | Performed by: INTERNAL MEDICINE

## 2019-09-13 PROCEDURE — 36415 COLL VENOUS BLD VENIPUNCTURE: CPT

## 2019-09-13 RX ADMIN — ERYTHROPOIETIN 40000 UNITS: 40000 INJECTION, SOLUTION INTRAVENOUS; SUBCUTANEOUS at 12:11

## 2019-09-20 ENCOUNTER — HOSPITAL ENCOUNTER (OUTPATIENT)
Dept: ULTRASOUND IMAGING | Facility: HOSPITAL | Age: 79
Discharge: HOME | End: 2019-09-20
Admitting: INTERNAL MEDICINE

## 2019-09-20 ENCOUNTER — LAB (OUTPATIENT)
Dept: LAB | Facility: HOSPITAL | Age: 79
End: 2019-09-20

## 2019-09-20 ENCOUNTER — INFUSION (OUTPATIENT)
Dept: ONCOLOGY | Facility: HOSPITAL | Age: 79
End: 2019-09-20

## 2019-09-20 VITALS
RESPIRATION RATE: 20 BRPM | BODY MASS INDEX: 22.26 KG/M2 | DIASTOLIC BLOOD PRESSURE: 69 MMHG | HEART RATE: 83 BPM | OXYGEN SATURATION: 100 % | HEIGHT: 71 IN | WEIGHT: 159 LBS | TEMPERATURE: 98.3 F | SYSTOLIC BLOOD PRESSURE: 152 MMHG

## 2019-09-20 DIAGNOSIS — D46.9 MDS (MYELODYSPLASTIC SYNDROME) (HCC): Primary | ICD-10-CM

## 2019-09-20 DIAGNOSIS — R79.89 ELEVATED LIVER FUNCTION TESTS: ICD-10-CM

## 2019-09-20 LAB
BASOPHILS # BLD AUTO: 0.03 10*3/MM3 (ref 0–0.2)
BASOPHILS NFR BLD AUTO: 0.4 % (ref 0–1.5)
DEPRECATED RDW RBC AUTO: 102.3 FL (ref 37–54)
EOSINOPHIL # BLD AUTO: 0.07 10*3/MM3 (ref 0–0.4)
EOSINOPHIL NFR BLD AUTO: 0.8 % (ref 0.3–6.2)
ERYTHROCYTE [DISTWIDTH] IN BLOOD BY AUTOMATED COUNT: 27.9 % (ref 12.3–15.4)
HCT VFR BLD AUTO: 29.2 % (ref 37.5–51)
HGB BLD-MCNC: 9.2 G/DL (ref 13–17.7)
HOLD SPECIMEN: NORMAL
LYMPHOCYTES # BLD AUTO: 1.14 10*3/MM3 (ref 0.7–3.1)
LYMPHOCYTES NFR BLD AUTO: 13.6 % (ref 19.6–45.3)
MCH RBC QN AUTO: 33.6 PG (ref 26.6–33)
MCHC RBC AUTO-ENTMCNC: 31.5 G/DL (ref 31.5–35.7)
MCV RBC AUTO: 106.6 FL (ref 79–97)
MONOCYTES # BLD AUTO: 0.65 10*3/MM3 (ref 0.1–0.9)
MONOCYTES NFR BLD AUTO: 7.8 % (ref 5–12)
NEUTROPHILS # BLD AUTO: 6.41 10*3/MM3 (ref 1.7–7)
NEUTROPHILS NFR BLD AUTO: 76.4 % (ref 42.7–76)
PLATELET # BLD AUTO: 122 10*3/MM3 (ref 140–450)
PMV BLD AUTO: 13.7 FL (ref 6–12)
RBC # BLD AUTO: 2.74 10*6/MM3 (ref 4.14–5.8)
WBC NRBC COR # BLD: 8.38 10*3/MM3 (ref 3.4–10.8)

## 2019-09-20 PROCEDURE — 25010000002 EPOETIN ALFA PER 1000 UNITS: Performed by: INTERNAL MEDICINE

## 2019-09-20 PROCEDURE — 36415 COLL VENOUS BLD VENIPUNCTURE: CPT

## 2019-09-20 PROCEDURE — 85025 COMPLETE CBC W/AUTO DIFF WBC: CPT | Performed by: INTERNAL MEDICINE

## 2019-09-20 PROCEDURE — 96372 THER/PROPH/DIAG INJ SC/IM: CPT

## 2019-09-20 PROCEDURE — 76705 ECHO EXAM OF ABDOMEN: CPT

## 2019-09-20 RX ORDER — MEGESTROL ACETATE 40 MG/ML
SUSPENSION ORAL
Refills: 3 | Status: ON HOLD | COMMUNITY
Start: 2019-09-10 | End: 2019-12-10 | Stop reason: SDUPTHER

## 2019-09-20 RX ORDER — ALBUTEROL SULFATE 90 UG/1
AEROSOL, METERED RESPIRATORY (INHALATION)
Refills: 11 | COMMUNITY
Start: 2019-09-17

## 2019-09-20 RX ADMIN — ERYTHROPOIETIN 40000 UNITS: 40000 INJECTION, SOLUTION INTRAVENOUS; SUBCUTANEOUS at 12:07

## 2019-09-27 ENCOUNTER — INFUSION (OUTPATIENT)
Dept: ONCOLOGY | Facility: HOSPITAL | Age: 79
End: 2019-09-27

## 2019-09-27 ENCOUNTER — LAB (OUTPATIENT)
Dept: LAB | Facility: HOSPITAL | Age: 79
End: 2019-09-27

## 2019-09-27 DIAGNOSIS — D46.9 MDS (MYELODYSPLASTIC SYNDROME) (HCC): Primary | ICD-10-CM

## 2019-09-27 DIAGNOSIS — D46.9 MDS (MYELODYSPLASTIC SYNDROME) (HCC): ICD-10-CM

## 2019-09-27 LAB
BASOPHILS # BLD AUTO: 0.06 10*3/MM3 (ref 0–0.2)
BASOPHILS NFR BLD AUTO: 0.6 % (ref 0–1.5)
DEPRECATED RDW RBC AUTO: 106.1 FL (ref 37–54)
EOSINOPHIL # BLD AUTO: 0.22 10*3/MM3 (ref 0–0.4)
EOSINOPHIL NFR BLD AUTO: 2.3 % (ref 0.3–6.2)
ERYTHROCYTE [DISTWIDTH] IN BLOOD BY AUTOMATED COUNT: 28.9 % (ref 12.3–15.4)
HCT VFR BLD AUTO: 29.7 % (ref 37.5–51)
HGB BLD-MCNC: 9.3 G/DL (ref 13–17.7)
HOLD SPECIMEN: NORMAL
LYMPHOCYTES # BLD AUTO: 2.41 10*3/MM3 (ref 0.7–3.1)
LYMPHOCYTES NFR BLD AUTO: 25 % (ref 19.6–45.3)
MCH RBC QN AUTO: 33.6 PG (ref 26.6–33)
MCHC RBC AUTO-ENTMCNC: 31.3 G/DL (ref 31.5–35.7)
MCV RBC AUTO: 107.2 FL (ref 79–97)
MONOCYTES # BLD AUTO: 0.92 10*3/MM3 (ref 0.1–0.9)
MONOCYTES NFR BLD AUTO: 9.5 % (ref 5–12)
NEUTROPHILS # BLD AUTO: 5.92 10*3/MM3 (ref 1.7–7)
NEUTROPHILS NFR BLD AUTO: 61.4 % (ref 42.7–76)
PLATELET # BLD AUTO: 194 10*3/MM3 (ref 140–450)
PMV BLD AUTO: 12.5 FL (ref 6–12)
RBC # BLD AUTO: 2.77 10*6/MM3 (ref 4.14–5.8)
WBC NRBC COR # BLD: 9.65 10*3/MM3 (ref 3.4–10.8)

## 2019-09-27 PROCEDURE — 25010000002 EPOETIN ALFA PER 1000 UNITS: Performed by: INTERNAL MEDICINE

## 2019-09-27 PROCEDURE — 96372 THER/PROPH/DIAG INJ SC/IM: CPT

## 2019-09-27 PROCEDURE — 85025 COMPLETE CBC W/AUTO DIFF WBC: CPT | Performed by: INTERNAL MEDICINE

## 2019-09-27 PROCEDURE — 36415 COLL VENOUS BLD VENIPUNCTURE: CPT

## 2019-09-27 RX ADMIN — ERYTHROPOIETIN 40000 UNITS: 40000 INJECTION, SOLUTION INTRAVENOUS; SUBCUTANEOUS at 12:43

## 2019-10-04 ENCOUNTER — INFUSION (OUTPATIENT)
Dept: ONCOLOGY | Facility: HOSPITAL | Age: 79
End: 2019-10-04

## 2019-10-04 ENCOUNTER — LAB (OUTPATIENT)
Dept: LAB | Facility: HOSPITAL | Age: 79
End: 2019-10-04

## 2019-10-04 VITALS
DIASTOLIC BLOOD PRESSURE: 67 MMHG | SYSTOLIC BLOOD PRESSURE: 156 MMHG | TEMPERATURE: 98.5 F | OXYGEN SATURATION: 97 % | HEART RATE: 88 BPM | RESPIRATION RATE: 18 BRPM | WEIGHT: 161 LBS | HEIGHT: 71 IN | BODY MASS INDEX: 22.54 KG/M2

## 2019-10-04 DIAGNOSIS — D64.9 ANEMIA, UNSPECIFIED TYPE: ICD-10-CM

## 2019-10-04 DIAGNOSIS — D46.9 MDS (MYELODYSPLASTIC SYNDROME) (HCC): Primary | ICD-10-CM

## 2019-10-04 DIAGNOSIS — D46.9 MYELODYSPLASTIC SYNDROME (HCC): ICD-10-CM

## 2019-10-04 DIAGNOSIS — D50.9 IRON DEFICIENCY ANEMIA, UNSPECIFIED IRON DEFICIENCY ANEMIA TYPE: ICD-10-CM

## 2019-10-04 DIAGNOSIS — D46.9 MDS (MYELODYSPLASTIC SYNDROME) (HCC): ICD-10-CM

## 2019-10-04 LAB
ALBUMIN SERPL-MCNC: 3.9 G/DL (ref 3.5–5.2)
ALBUMIN/GLOB SERPL: 1 G/DL
ALP SERPL-CCNC: 106 U/L (ref 39–117)
ALT SERPL W P-5'-P-CCNC: 19 U/L (ref 1–41)
ANION GAP SERPL CALCULATED.3IONS-SCNC: 12 MMOL/L (ref 5–15)
AST SERPL-CCNC: 28 U/L (ref 1–40)
BASOPHILS # BLD AUTO: 0.04 10*3/MM3 (ref 0–0.2)
BASOPHILS NFR BLD AUTO: 0.5 % (ref 0–1.5)
BILIRUB SERPL-MCNC: 0.9 MG/DL (ref 0.2–1.2)
BUN BLD-MCNC: 19 MG/DL (ref 8–23)
BUN/CREAT SERPL: 19.2 (ref 7–25)
CALCIUM SPEC-SCNC: 9.4 MG/DL (ref 8.6–10.5)
CHLORIDE SERPL-SCNC: 100 MMOL/L (ref 98–107)
CO2 SERPL-SCNC: 26 MMOL/L (ref 22–29)
CREAT BLD-MCNC: 0.99 MG/DL (ref 0.76–1.27)
EOSINOPHIL # BLD AUTO: 0.22 10*3/MM3 (ref 0–0.4)
EOSINOPHIL NFR BLD AUTO: 2.8 % (ref 0.3–6.2)
ERYTHROCYTE [DISTWIDTH] IN BLOOD BY AUTOMATED COUNT: ABNORMAL %
FERRITIN SERPL-MCNC: 2552 NG/ML (ref 30–400)
GFR SERPL CREATININE-BSD FRML MDRD: 73 ML/MIN/1.73
GLOBULIN UR ELPH-MCNC: 4.1 GM/DL
GLUCOSE BLD-MCNC: 86 MG/DL (ref 65–99)
HCT VFR BLD AUTO: 30.7 % (ref 37.5–51)
HGB BLD-MCNC: 9.6 G/DL (ref 13–17.7)
IRON 24H UR-MRATE: 76 MCG/DL (ref 59–158)
IRON 24H UR-MRATE: 76 MCG/DL (ref 59–158)
IRON SATN MFR SERPL: 31 % (ref 20–50)
LYMPHOCYTES # BLD AUTO: 1.51 10*3/MM3 (ref 0.7–3.1)
LYMPHOCYTES NFR BLD AUTO: 19.5 % (ref 19.6–45.3)
MCH RBC QN AUTO: 34.7 PG (ref 26.6–33)
MCHC RBC AUTO-ENTMCNC: 31.3 G/DL (ref 31.5–35.7)
MCV RBC AUTO: 110.8 FL (ref 79–97)
MONOCYTES # BLD AUTO: 0.7 10*3/MM3 (ref 0.1–0.9)
MONOCYTES NFR BLD AUTO: 9 % (ref 5–12)
NEUTROPHILS # BLD AUTO: 5.18 10*3/MM3 (ref 1.7–7)
NEUTROPHILS NFR BLD AUTO: 66.9 % (ref 42.7–76)
PLATELET # BLD AUTO: 161 10*3/MM3 (ref 140–450)
PMV BLD AUTO: 11.2 FL (ref 6–12)
POTASSIUM BLD-SCNC: 4 MMOL/L (ref 3.5–5.2)
PROT SERPL-MCNC: 8 G/DL (ref 6–8.5)
RBC # BLD AUTO: 2.77 10*6/MM3 (ref 4.14–5.8)
SODIUM BLD-SCNC: 138 MMOL/L (ref 136–145)
TIBC SERPL-MCNC: 244 MCG/DL (ref 298–536)
TRANSFERRIN SERPL-MCNC: 164 MG/DL (ref 200–360)
WBC NRBC COR # BLD: 7.75 10*3/MM3 (ref 3.4–10.8)

## 2019-10-04 PROCEDURE — 36415 COLL VENOUS BLD VENIPUNCTURE: CPT

## 2019-10-04 PROCEDURE — 84466 ASSAY OF TRANSFERRIN: CPT | Performed by: INTERNAL MEDICINE

## 2019-10-04 PROCEDURE — 96372 THER/PROPH/DIAG INJ SC/IM: CPT

## 2019-10-04 PROCEDURE — 85025 COMPLETE CBC W/AUTO DIFF WBC: CPT | Performed by: INTERNAL MEDICINE

## 2019-10-04 PROCEDURE — 83540 ASSAY OF IRON: CPT | Performed by: INTERNAL MEDICINE

## 2019-10-04 PROCEDURE — 80053 COMPREHEN METABOLIC PANEL: CPT | Performed by: INTERNAL MEDICINE

## 2019-10-04 PROCEDURE — 25010000002 EPOETIN ALFA PER 1000 UNITS: Performed by: INTERNAL MEDICINE

## 2019-10-04 PROCEDURE — 82728 ASSAY OF FERRITIN: CPT | Performed by: INTERNAL MEDICINE

## 2019-10-04 RX ADMIN — ERYTHROPOIETIN 40000 UNITS: 40000 INJECTION, SOLUTION INTRAVENOUS; SUBCUTANEOUS at 12:19

## 2019-10-11 ENCOUNTER — APPOINTMENT (OUTPATIENT)
Dept: LAB | Facility: HOSPITAL | Age: 79
End: 2019-10-11

## 2019-10-11 ENCOUNTER — INFUSION (OUTPATIENT)
Dept: ONCOLOGY | Facility: HOSPITAL | Age: 79
End: 2019-10-11

## 2019-10-11 VITALS
TEMPERATURE: 96.7 F | BODY MASS INDEX: 22.68 KG/M2 | HEART RATE: 87 BPM | DIASTOLIC BLOOD PRESSURE: 62 MMHG | HEIGHT: 71 IN | OXYGEN SATURATION: 98 % | SYSTOLIC BLOOD PRESSURE: 155 MMHG | RESPIRATION RATE: 18 BRPM | WEIGHT: 162 LBS

## 2019-10-11 DIAGNOSIS — D46.9 MDS (MYELODYSPLASTIC SYNDROME) (HCC): Primary | ICD-10-CM

## 2019-10-11 LAB
BASOPHILS # BLD AUTO: 0.02 10*3/MM3 (ref 0–0.2)
BASOPHILS NFR BLD AUTO: 0.3 % (ref 0–1.5)
DEPRECATED RDW RBC AUTO: 108.9 FL (ref 37–54)
EOSINOPHIL # BLD AUTO: 0.17 10*3/MM3 (ref 0–0.4)
EOSINOPHIL NFR BLD AUTO: 2.7 % (ref 0.3–6.2)
ERYTHROCYTE [DISTWIDTH] IN BLOOD BY AUTOMATED COUNT: 28.1 % (ref 12.3–15.4)
HCT VFR BLD AUTO: 29.5 % (ref 37.5–51)
HGB BLD-MCNC: 9.2 G/DL (ref 13–17.7)
HOLD SPECIMEN: NORMAL
LYMPHOCYTES # BLD AUTO: 1.54 10*3/MM3 (ref 0.7–3.1)
LYMPHOCYTES NFR BLD AUTO: 24.8 % (ref 19.6–45.3)
MCH RBC QN AUTO: 34.5 PG (ref 26.6–33)
MCHC RBC AUTO-ENTMCNC: 31.2 G/DL (ref 31.5–35.7)
MCV RBC AUTO: 110.5 FL (ref 79–97)
MONOCYTES # BLD AUTO: 0.43 10*3/MM3 (ref 0.1–0.9)
MONOCYTES NFR BLD AUTO: 6.9 % (ref 5–12)
NEUTROPHILS # BLD AUTO: 3.98 10*3/MM3 (ref 1.7–7)
NEUTROPHILS NFR BLD AUTO: 64.3 % (ref 42.7–76)
PLATELET # BLD AUTO: 167 10*3/MM3 (ref 140–450)
PMV BLD AUTO: 12.9 FL (ref 6–12)
RBC # BLD AUTO: 2.67 10*6/MM3 (ref 4.14–5.8)
WBC NRBC COR # BLD: 6.2 10*3/MM3 (ref 3.4–10.8)

## 2019-10-11 PROCEDURE — 96372 THER/PROPH/DIAG INJ SC/IM: CPT

## 2019-10-11 PROCEDURE — 36415 COLL VENOUS BLD VENIPUNCTURE: CPT

## 2019-10-11 PROCEDURE — 25010000002 EPOETIN ALFA PER 1000 UNITS: Performed by: INTERNAL MEDICINE

## 2019-10-11 PROCEDURE — 85025 COMPLETE CBC W/AUTO DIFF WBC: CPT | Performed by: INTERNAL MEDICINE

## 2019-10-11 RX ADMIN — ERYTHROPOIETIN 40000 UNITS: 40000 INJECTION, SOLUTION INTRAVENOUS; SUBCUTANEOUS at 12:41

## 2019-10-18 ENCOUNTER — INFUSION (OUTPATIENT)
Dept: ONCOLOGY | Facility: HOSPITAL | Age: 79
End: 2019-10-18

## 2019-10-18 ENCOUNTER — LAB (OUTPATIENT)
Dept: LAB | Facility: HOSPITAL | Age: 79
End: 2019-10-18

## 2019-10-18 VITALS
HEIGHT: 71 IN | HEART RATE: 89 BPM | SYSTOLIC BLOOD PRESSURE: 145 MMHG | OXYGEN SATURATION: 99 % | DIASTOLIC BLOOD PRESSURE: 70 MMHG | TEMPERATURE: 97.9 F | WEIGHT: 162 LBS | RESPIRATION RATE: 18 BRPM | BODY MASS INDEX: 22.68 KG/M2

## 2019-10-18 DIAGNOSIS — D46.9 MDS (MYELODYSPLASTIC SYNDROME) (HCC): Primary | ICD-10-CM

## 2019-10-18 LAB
BASOPHILS # BLD AUTO: 0.03 10*3/MM3 (ref 0–0.2)
BASOPHILS NFR BLD AUTO: 0.5 % (ref 0–1.5)
DEPRECATED RDW RBC AUTO: 103.2 FL (ref 37–54)
EOSINOPHIL # BLD AUTO: 0.17 10*3/MM3 (ref 0–0.4)
EOSINOPHIL NFR BLD AUTO: 3 % (ref 0.3–6.2)
ERYTHROCYTE [DISTWIDTH] IN BLOOD BY AUTOMATED COUNT: 26.4 % (ref 12.3–15.4)
HCT VFR BLD AUTO: 27.6 % (ref 37.5–51)
HGB BLD-MCNC: 8.6 G/DL (ref 13–17.7)
HOLD SPECIMEN: NORMAL
LYMPHOCYTES # BLD AUTO: 1.16 10*3/MM3 (ref 0.7–3.1)
LYMPHOCYTES NFR BLD AUTO: 20.5 % (ref 19.6–45.3)
MCH RBC QN AUTO: 34.7 PG (ref 26.6–33)
MCHC RBC AUTO-ENTMCNC: 31.2 G/DL (ref 31.5–35.7)
MCV RBC AUTO: 111.3 FL (ref 79–97)
MONOCYTES # BLD AUTO: 0.47 10*3/MM3 (ref 0.1–0.9)
MONOCYTES NFR BLD AUTO: 8.3 % (ref 5–12)
NEUTROPHILS # BLD AUTO: 3.8 10*3/MM3 (ref 1.7–7)
NEUTROPHILS NFR BLD AUTO: 67 % (ref 42.7–76)
PLATELET # BLD AUTO: 127 10*3/MM3 (ref 140–450)
PMV BLD AUTO: 13.9 FL (ref 6–12)
RBC # BLD AUTO: 2.48 10*6/MM3 (ref 4.14–5.8)
WBC NRBC COR # BLD: 5.67 10*3/MM3 (ref 3.4–10.8)

## 2019-10-18 PROCEDURE — 85025 COMPLETE CBC W/AUTO DIFF WBC: CPT | Performed by: INTERNAL MEDICINE

## 2019-10-18 PROCEDURE — 36415 COLL VENOUS BLD VENIPUNCTURE: CPT

## 2019-10-18 PROCEDURE — 96372 THER/PROPH/DIAG INJ SC/IM: CPT

## 2019-10-18 PROCEDURE — 25010000002 EPOETIN ALFA PER 1000 UNITS: Performed by: INTERNAL MEDICINE

## 2019-10-18 RX ADMIN — ERYTHROPOIETIN 40000 UNITS: 40000 INJECTION, SOLUTION INTRAVENOUS; SUBCUTANEOUS at 12:38

## 2019-10-25 ENCOUNTER — INFUSION (OUTPATIENT)
Dept: ONCOLOGY | Facility: HOSPITAL | Age: 79
End: 2019-10-25

## 2019-10-25 ENCOUNTER — LAB (OUTPATIENT)
Dept: LAB | Facility: HOSPITAL | Age: 79
End: 2019-10-25

## 2019-10-25 VITALS
BODY MASS INDEX: 23.24 KG/M2 | HEART RATE: 97 BPM | DIASTOLIC BLOOD PRESSURE: 58 MMHG | OXYGEN SATURATION: 100 % | TEMPERATURE: 99.1 F | WEIGHT: 166 LBS | SYSTOLIC BLOOD PRESSURE: 150 MMHG | HEIGHT: 71 IN

## 2019-10-25 DIAGNOSIS — D46.9 MYELODYSPLASTIC SYNDROME (HCC): ICD-10-CM

## 2019-10-25 DIAGNOSIS — D64.9 ANEMIA, UNSPECIFIED TYPE: ICD-10-CM

## 2019-10-25 DIAGNOSIS — D50.9 IRON DEFICIENCY ANEMIA, UNSPECIFIED IRON DEFICIENCY ANEMIA TYPE: ICD-10-CM

## 2019-10-25 DIAGNOSIS — D46.9 MDS (MYELODYSPLASTIC SYNDROME) (HCC): Primary | ICD-10-CM

## 2019-10-25 LAB
ANISOCYTOSIS BLD QL: ABNORMAL
DEPRECATED RDW RBC AUTO: 96.9 FL (ref 37–54)
ERYTHROCYTE [DISTWIDTH] IN BLOOD BY AUTOMATED COUNT: 24.4 % (ref 12.3–15.4)
GIANT PLATELETS: ABNORMAL
HCT VFR BLD AUTO: 24.6 % (ref 37.5–51)
HGB BLD-MCNC: 7.7 G/DL (ref 13–17.7)
HOLD SPECIMEN: NORMAL
HYPOCHROMIA BLD QL: ABNORMAL
LYMPHOCYTES # BLD MANUAL: 1.12 10*3/MM3 (ref 0.7–3.1)
LYMPHOCYTES NFR BLD MANUAL: 17 % (ref 19.6–45.3)
LYMPHOCYTES NFR BLD MANUAL: 7 % (ref 5–12)
MACROCYTES BLD QL SMEAR: ABNORMAL
MCH RBC QN AUTO: 34.8 PG (ref 26.6–33)
MCHC RBC AUTO-ENTMCNC: 31.3 G/DL (ref 31.5–35.7)
MCV RBC AUTO: 111.3 FL (ref 79–97)
MONOCYTES # BLD AUTO: 0.46 10*3/MM3 (ref 0.1–0.9)
NEUTROPHILS # BLD AUTO: 4.99 10*3/MM3 (ref 1.7–7)
NEUTROPHILS NFR BLD MANUAL: 76 % (ref 42.7–76)
NRBC SPEC MANUAL: 2 /100 WBC (ref 0–0.2)
PLATELET # BLD AUTO: 107 10*3/MM3 (ref 140–450)
PMV BLD AUTO: 13.9 FL (ref 6–12)
POIKILOCYTOSIS BLD QL SMEAR: ABNORMAL
POLYCHROMASIA BLD QL SMEAR: ABNORMAL
RBC # BLD AUTO: 2.21 10*6/MM3 (ref 4.14–5.8)
SMALL PLATELETS BLD QL SMEAR: ABNORMAL
SPHEROCYTES BLD QL SMEAR: ABNORMAL
WBC MORPH BLD: NORMAL
WBC NRBC COR # BLD: 6.56 10*3/MM3 (ref 3.4–10.8)

## 2019-10-25 PROCEDURE — 85007 BL SMEAR W/DIFF WBC COUNT: CPT | Performed by: INTERNAL MEDICINE

## 2019-10-25 PROCEDURE — 36415 COLL VENOUS BLD VENIPUNCTURE: CPT

## 2019-10-25 PROCEDURE — 96372 THER/PROPH/DIAG INJ SC/IM: CPT

## 2019-10-25 PROCEDURE — 85025 COMPLETE CBC W/AUTO DIFF WBC: CPT | Performed by: INTERNAL MEDICINE

## 2019-10-25 PROCEDURE — 25010000002 EPOETIN ALFA-EPBX 40000 UNIT/ML SOLUTION: Performed by: INTERNAL MEDICINE

## 2019-10-25 RX ADMIN — EPOETIN ALFA-EPBX 40000 UNITS: 40000 INJECTION, SOLUTION INTRAVENOUS; SUBCUTANEOUS at 12:42

## 2019-11-01 ENCOUNTER — LAB (OUTPATIENT)
Dept: LAB | Facility: HOSPITAL | Age: 79
End: 2019-11-01

## 2019-11-01 ENCOUNTER — INFUSION (OUTPATIENT)
Dept: ONCOLOGY | Facility: HOSPITAL | Age: 79
End: 2019-11-01

## 2019-11-01 ENCOUNTER — OFFICE VISIT (OUTPATIENT)
Dept: ONCOLOGY | Facility: CLINIC | Age: 79
End: 2019-11-01

## 2019-11-01 VITALS
RESPIRATION RATE: 20 BRPM | OXYGEN SATURATION: 98 % | HEART RATE: 100 BPM | SYSTOLIC BLOOD PRESSURE: 122 MMHG | HEIGHT: 71 IN | BODY MASS INDEX: 22.61 KG/M2 | TEMPERATURE: 98 F | WEIGHT: 161.5 LBS | DIASTOLIC BLOOD PRESSURE: 60 MMHG

## 2019-11-01 VITALS
WEIGHT: 161 LBS | HEIGHT: 71 IN | BODY MASS INDEX: 22.54 KG/M2 | HEART RATE: 77 BPM | SYSTOLIC BLOOD PRESSURE: 117 MMHG | OXYGEN SATURATION: 100 % | TEMPERATURE: 98.6 F | DIASTOLIC BLOOD PRESSURE: 70 MMHG | RESPIRATION RATE: 16 BRPM

## 2019-11-01 DIAGNOSIS — D46.9 MDS (MYELODYSPLASTIC SYNDROME) (HCC): ICD-10-CM

## 2019-11-01 DIAGNOSIS — D46.9 MDS (MYELODYSPLASTIC SYNDROME) (HCC): Primary | ICD-10-CM

## 2019-11-01 LAB
ABO GROUP BLD: NORMAL
BLD GP AB SCN SERPL QL: NEGATIVE
RH BLD: POSITIVE
T&S EXPIRATION DATE: NORMAL

## 2019-11-01 PROCEDURE — 86923 COMPATIBILITY TEST ELECTRIC: CPT

## 2019-11-01 PROCEDURE — 36415 COLL VENOUS BLD VENIPUNCTURE: CPT

## 2019-11-01 PROCEDURE — 86901 BLOOD TYPING SEROLOGIC RH(D): CPT | Performed by: INTERNAL MEDICINE

## 2019-11-01 PROCEDURE — 86900 BLOOD TYPING SEROLOGIC ABO: CPT | Performed by: INTERNAL MEDICINE

## 2019-11-01 PROCEDURE — 96374 THER/PROPH/DIAG INJ IV PUSH: CPT

## 2019-11-01 PROCEDURE — 86850 RBC ANTIBODY SCREEN: CPT | Performed by: INTERNAL MEDICINE

## 2019-11-01 PROCEDURE — P9016 RBC LEUKOCYTES REDUCED: HCPCS

## 2019-11-01 PROCEDURE — 25010000002 DIPHENHYDRAMINE PER 50 MG: Performed by: INTERNAL MEDICINE

## 2019-11-01 PROCEDURE — 86900 BLOOD TYPING SEROLOGIC ABO: CPT

## 2019-11-01 PROCEDURE — A9270 NON-COVERED ITEM OR SERVICE: HCPCS | Performed by: INTERNAL MEDICINE

## 2019-11-01 PROCEDURE — 36430 TRANSFUSION BLD/BLD COMPNT: CPT

## 2019-11-01 PROCEDURE — 99214 OFFICE O/P EST MOD 30 MIN: CPT | Performed by: INTERNAL MEDICINE

## 2019-11-01 PROCEDURE — 63710000001 ACETAMINOPHEN 325 MG TABLET: Performed by: INTERNAL MEDICINE

## 2019-11-01 RX ORDER — SODIUM CHLORIDE 9 MG/ML
250 INJECTION, SOLUTION INTRAVENOUS AS NEEDED
Status: DISCONTINUED | OUTPATIENT
Start: 2019-11-01 | End: 2019-11-01 | Stop reason: HOSPADM

## 2019-11-01 RX ORDER — FUROSEMIDE 10 MG/ML
20 INJECTION INTRAMUSCULAR; INTRAVENOUS ONCE
Status: CANCELLED | OUTPATIENT
Start: 2019-11-01 | End: 2019-11-01

## 2019-11-01 RX ORDER — SODIUM CHLORIDE 9 MG/ML
250 INJECTION, SOLUTION INTRAVENOUS AS NEEDED
Status: CANCELLED | OUTPATIENT
Start: 2019-11-01

## 2019-11-01 RX ORDER — DIPHENHYDRAMINE HYDROCHLORIDE 50 MG/ML
25 INJECTION INTRAMUSCULAR; INTRAVENOUS ONCE
Status: CANCELLED | OUTPATIENT
Start: 2019-11-01 | End: 2019-11-01

## 2019-11-01 RX ORDER — DIPHENHYDRAMINE HYDROCHLORIDE 50 MG/ML
25 INJECTION INTRAMUSCULAR; INTRAVENOUS ONCE
Status: COMPLETED | OUTPATIENT
Start: 2019-11-01 | End: 2019-11-01

## 2019-11-01 RX ORDER — ACETAMINOPHEN 325 MG/1
650 TABLET ORAL ONCE
Status: CANCELLED | OUTPATIENT
Start: 2019-11-01 | End: 2019-11-01

## 2019-11-01 RX ORDER — FUROSEMIDE 10 MG/ML
20 INJECTION INTRAMUSCULAR; INTRAVENOUS ONCE
Status: DISCONTINUED | OUTPATIENT
Start: 2019-11-01 | End: 2019-11-01 | Stop reason: HOSPADM

## 2019-11-01 RX ORDER — ACETAMINOPHEN 325 MG/1
650 TABLET ORAL ONCE
Status: COMPLETED | OUTPATIENT
Start: 2019-11-01 | End: 2019-11-01

## 2019-11-01 RX ADMIN — SODIUM CHLORIDE 250 ML: 9 INJECTION, SOLUTION INTRAVENOUS at 13:35

## 2019-11-01 RX ADMIN — DIPHENHYDRAMINE HYDROCHLORIDE 25 MG: 50 INJECTION, SOLUTION INTRAMUSCULAR; INTRAVENOUS at 13:41

## 2019-11-01 RX ADMIN — ACETAMINOPHEN 650 MG: 325 TABLET, FILM COATED ORAL at 13:41

## 2019-11-02 LAB
ABO + RH BLD: NORMAL
BH BB BLOOD EXPIRATION DATE: NORMAL
BH BB BLOOD TYPE BARCODE: 5100
BH BB DISPENSE STATUS: NORMAL
BH BB PRODUCT CODE: NORMAL
BH BB UNIT NUMBER: NORMAL
UNIT  ABO: NORMAL
UNIT  RH: NORMAL

## 2019-11-05 PROBLEM — K80.20 GALLSTONES: Status: ACTIVE | Noted: 2019-01-01

## 2019-11-05 PROBLEM — R91.8 LUNG MASS: Status: ACTIVE | Noted: 2019-01-01

## 2019-11-05 PROBLEM — G89.4 CHRONIC PAIN SYNDROME: Status: ACTIVE | Noted: 2019-01-01

## 2019-11-05 PROBLEM — E78.2 MIXED HYPERLIPIDEMIA: Status: ACTIVE | Noted: 2019-01-01

## 2019-11-08 ENCOUNTER — INFUSION (OUTPATIENT)
Dept: ONCOLOGY | Facility: HOSPITAL | Age: 79
End: 2019-11-08

## 2019-11-08 ENCOUNTER — LAB (OUTPATIENT)
Dept: LAB | Facility: HOSPITAL | Age: 79
End: 2019-11-08

## 2019-11-08 VITALS
RESPIRATION RATE: 18 BRPM | TEMPERATURE: 98.3 F | SYSTOLIC BLOOD PRESSURE: 143 MMHG | OXYGEN SATURATION: 97 % | DIASTOLIC BLOOD PRESSURE: 68 MMHG | HEART RATE: 88 BPM

## 2019-11-08 DIAGNOSIS — D46.9 MDS (MYELODYSPLASTIC SYNDROME) (HCC): ICD-10-CM

## 2019-11-08 DIAGNOSIS — D46.9 MDS (MYELODYSPLASTIC SYNDROME) (HCC): Primary | ICD-10-CM

## 2019-11-08 LAB
ABO GROUP BLD: NORMAL
ALBUMIN SERPL-MCNC: 3 G/DL (ref 3.5–5.2)
ALBUMIN/GLOB SERPL: 0.7 G/DL
ALP SERPL-CCNC: 305 U/L (ref 39–117)
ALT SERPL W P-5'-P-CCNC: 58 U/L (ref 1–41)
ANION GAP SERPL CALCULATED.3IONS-SCNC: 8 MMOL/L (ref 5–15)
AST SERPL-CCNC: 45 U/L (ref 1–40)
BASOPHILS # BLD AUTO: 0.03 10*3/MM3 (ref 0–0.2)
BASOPHILS NFR BLD AUTO: 0.4 % (ref 0–1.5)
BILIRUB SERPL-MCNC: 1.2 MG/DL (ref 0.2–1.2)
BLD GP AB SCN SERPL QL: NEGATIVE
BUN BLD-MCNC: 17 MG/DL (ref 8–23)
BUN/CREAT SERPL: 17.5 (ref 7–25)
CALCIUM SPEC-SCNC: 8.8 MG/DL (ref 8.6–10.5)
CHLORIDE SERPL-SCNC: 99 MMOL/L (ref 98–107)
CO2 SERPL-SCNC: 28 MMOL/L (ref 22–29)
CREAT BLD-MCNC: 0.97 MG/DL (ref 0.76–1.27)
DEPRECATED RDW RBC AUTO: 90 FL (ref 37–54)
EOSINOPHIL # BLD AUTO: 0.09 10*3/MM3 (ref 0–0.4)
EOSINOPHIL NFR BLD AUTO: 1.2 % (ref 0.3–6.2)
ERYTHROCYTE [DISTWIDTH] IN BLOOD BY AUTOMATED COUNT: 23.7 % (ref 12.3–15.4)
FERRITIN SERPL-MCNC: 3389 NG/ML (ref 30–400)
GFR SERPL CREATININE-BSD FRML MDRD: 74 ML/MIN/1.73
GLOBULIN UR ELPH-MCNC: 4.1 GM/DL
GLUCOSE BLD-MCNC: 112 MG/DL (ref 65–99)
HCT VFR BLD AUTO: 23.9 % (ref 37.5–51)
HGB BLD-MCNC: 7.5 G/DL (ref 13–17.7)
IMM GRANULOCYTES # BLD AUTO: 0.08 10*3/MM3 (ref 0–0.05)
IMM GRANULOCYTES NFR BLD AUTO: 1.1 % (ref 0–0.5)
IRON 24H UR-MRATE: 37 MCG/DL (ref 59–158)
IRON SATN MFR SERPL: 23 % (ref 20–50)
LYMPHOCYTES # BLD AUTO: 1.1 10*3/MM3 (ref 0.7–3.1)
LYMPHOCYTES NFR BLD AUTO: 14.9 % (ref 19.6–45.3)
MCH RBC QN AUTO: 32.9 PG (ref 26.6–33)
MCHC RBC AUTO-ENTMCNC: 31.4 G/DL (ref 31.5–35.7)
MCV RBC AUTO: 104.8 FL (ref 79–97)
MONOCYTES # BLD AUTO: 0.55 10*3/MM3 (ref 0.1–0.9)
MONOCYTES NFR BLD AUTO: 7.5 % (ref 5–12)
NEUTROPHILS # BLD AUTO: 5.52 10*3/MM3 (ref 1.7–7)
NEUTROPHILS NFR BLD AUTO: 74.9 % (ref 42.7–76)
NRBC BLD AUTO-RTO: 0 /100 WBC (ref 0–0.2)
PLATELET # BLD AUTO: 161 10*3/MM3 (ref 140–450)
PMV BLD AUTO: 12.8 FL (ref 6–12)
POTASSIUM BLD-SCNC: 4 MMOL/L (ref 3.5–5.2)
PROT SERPL-MCNC: 7.1 G/DL (ref 6–8.5)
RBC # BLD AUTO: 2.28 10*6/MM3 (ref 4.14–5.8)
RH BLD: POSITIVE
SODIUM BLD-SCNC: 135 MMOL/L (ref 136–145)
T&S EXPIRATION DATE: NORMAL
TIBC SERPL-MCNC: 161 MCG/DL (ref 298–536)
TRANSFERRIN SERPL-MCNC: 108 MG/DL (ref 200–360)
WBC NRBC COR # BLD: 7.37 10*3/MM3 (ref 3.4–10.8)

## 2019-11-08 PROCEDURE — 83540 ASSAY OF IRON: CPT | Performed by: INTERNAL MEDICINE

## 2019-11-08 PROCEDURE — A9270 NON-COVERED ITEM OR SERVICE: HCPCS | Performed by: INTERNAL MEDICINE

## 2019-11-08 PROCEDURE — P9016 RBC LEUKOCYTES REDUCED: HCPCS

## 2019-11-08 PROCEDURE — 36430 TRANSFUSION BLD/BLD COMPNT: CPT

## 2019-11-08 PROCEDURE — 63710000001 ACETAMINOPHEN 325 MG TABLET: Performed by: INTERNAL MEDICINE

## 2019-11-08 PROCEDURE — 36415 COLL VENOUS BLD VENIPUNCTURE: CPT

## 2019-11-08 PROCEDURE — 86901 BLOOD TYPING SEROLOGIC RH(D): CPT | Performed by: INTERNAL MEDICINE

## 2019-11-08 PROCEDURE — 86923 COMPATIBILITY TEST ELECTRIC: CPT

## 2019-11-08 PROCEDURE — 82728 ASSAY OF FERRITIN: CPT | Performed by: INTERNAL MEDICINE

## 2019-11-08 PROCEDURE — 84466 ASSAY OF TRANSFERRIN: CPT | Performed by: INTERNAL MEDICINE

## 2019-11-08 PROCEDURE — 80053 COMPREHEN METABOLIC PANEL: CPT | Performed by: INTERNAL MEDICINE

## 2019-11-08 PROCEDURE — 86850 RBC ANTIBODY SCREEN: CPT | Performed by: INTERNAL MEDICINE

## 2019-11-08 PROCEDURE — 85025 COMPLETE CBC W/AUTO DIFF WBC: CPT | Performed by: INTERNAL MEDICINE

## 2019-11-08 PROCEDURE — 86900 BLOOD TYPING SEROLOGIC ABO: CPT | Performed by: INTERNAL MEDICINE

## 2019-11-08 PROCEDURE — 86900 BLOOD TYPING SEROLOGIC ABO: CPT

## 2019-11-08 RX ORDER — ACETAMINOPHEN 325 MG/1
650 TABLET ORAL ONCE
Status: COMPLETED | OUTPATIENT
Start: 2019-11-08 | End: 2019-11-08

## 2019-11-08 RX ORDER — FUROSEMIDE 10 MG/ML
20 INJECTION INTRAMUSCULAR; INTRAVENOUS ONCE
Status: DISCONTINUED | OUTPATIENT
Start: 2019-11-08 | End: 2019-11-08 | Stop reason: HOSPADM

## 2019-11-08 RX ORDER — DIPHENHYDRAMINE HYDROCHLORIDE 50 MG/ML
25 INJECTION INTRAMUSCULAR; INTRAVENOUS ONCE
Status: DISCONTINUED | OUTPATIENT
Start: 2019-11-08 | End: 2019-11-08 | Stop reason: HOSPADM

## 2019-11-08 RX ORDER — DIPHENHYDRAMINE HYDROCHLORIDE 50 MG/ML
25 INJECTION INTRAMUSCULAR; INTRAVENOUS ONCE
Status: CANCELLED | OUTPATIENT
Start: 2019-11-08 | End: 2019-11-08

## 2019-11-08 RX ORDER — SODIUM CHLORIDE 9 MG/ML
250 INJECTION, SOLUTION INTRAVENOUS AS NEEDED
Status: DISCONTINUED | OUTPATIENT
Start: 2019-11-08 | End: 2019-11-08 | Stop reason: HOSPADM

## 2019-11-08 RX ORDER — SODIUM CHLORIDE 9 MG/ML
250 INJECTION, SOLUTION INTRAVENOUS AS NEEDED
Status: CANCELLED | OUTPATIENT
Start: 2019-11-08

## 2019-11-08 RX ORDER — FUROSEMIDE 10 MG/ML
20 INJECTION INTRAMUSCULAR; INTRAVENOUS ONCE
Status: CANCELLED | OUTPATIENT
Start: 2019-11-08 | End: 2019-11-08

## 2019-11-08 RX ORDER — ACETAMINOPHEN 325 MG/1
650 TABLET ORAL ONCE
Status: CANCELLED | OUTPATIENT
Start: 2019-11-08 | End: 2019-11-08

## 2019-11-08 RX ADMIN — ACETAMINOPHEN 650 MG: 325 TABLET, FILM COATED ORAL at 13:05

## 2019-11-08 NOTE — PROGRESS NOTES
Notified Dr. Martinez that Miguel's HGB 7.5 and he is symptomatic.  Verbal order given for 2 units PRBC with premeds tylenol and benadryl and lasix.  Scheduled for 1 unit today and 1 unit on Monday.

## 2019-11-11 ENCOUNTER — INFUSION (OUTPATIENT)
Dept: ONCOLOGY | Facility: HOSPITAL | Age: 79
End: 2019-11-11

## 2019-11-11 VITALS
RESPIRATION RATE: 18 BRPM | TEMPERATURE: 97.9 F | OXYGEN SATURATION: 95 % | DIASTOLIC BLOOD PRESSURE: 71 MMHG | HEART RATE: 71 BPM | BODY MASS INDEX: 22.82 KG/M2 | WEIGHT: 163 LBS | HEIGHT: 71 IN | SYSTOLIC BLOOD PRESSURE: 131 MMHG

## 2019-11-11 DIAGNOSIS — D53.9 ANEMIA, MACROCYTIC: ICD-10-CM

## 2019-11-11 PROCEDURE — P9016 RBC LEUKOCYTES REDUCED: HCPCS

## 2019-11-11 PROCEDURE — 25010000002 DIPHENHYDRAMINE PER 50 MG: Performed by: INTERNAL MEDICINE

## 2019-11-11 PROCEDURE — 86900 BLOOD TYPING SEROLOGIC ABO: CPT

## 2019-11-11 PROCEDURE — 96375 TX/PRO/DX INJ NEW DRUG ADDON: CPT

## 2019-11-11 PROCEDURE — 36430 TRANSFUSION BLD/BLD COMPNT: CPT

## 2019-11-11 RX ORDER — FUROSEMIDE 10 MG/ML
20 INJECTION INTRAMUSCULAR; INTRAVENOUS ONCE
Status: CANCELLED | OUTPATIENT
Start: 2019-11-11 | End: 2019-11-11

## 2019-11-11 RX ORDER — FUROSEMIDE 10 MG/ML
20 INJECTION INTRAMUSCULAR; INTRAVENOUS ONCE
Status: DISCONTINUED | OUTPATIENT
Start: 2019-11-11 | End: 2019-11-11 | Stop reason: HOSPADM

## 2019-11-11 RX ORDER — DIPHENHYDRAMINE HYDROCHLORIDE 50 MG/ML
25 INJECTION INTRAMUSCULAR; INTRAVENOUS ONCE
Status: COMPLETED | OUTPATIENT
Start: 2019-11-11 | End: 2019-11-11

## 2019-11-11 RX ORDER — DIPHENHYDRAMINE HYDROCHLORIDE 50 MG/ML
25 INJECTION INTRAMUSCULAR; INTRAVENOUS ONCE
Status: CANCELLED | OUTPATIENT
Start: 2019-11-11 | End: 2019-11-11

## 2019-11-11 RX ORDER — ACETAMINOPHEN 325 MG/1
650 TABLET ORAL ONCE
Status: CANCELLED | OUTPATIENT
Start: 2019-11-11 | End: 2019-11-11

## 2019-11-11 RX ORDER — SODIUM CHLORIDE 9 MG/ML
250 INJECTION, SOLUTION INTRAVENOUS AS NEEDED
Status: CANCELLED | OUTPATIENT
Start: 2019-11-11

## 2019-11-11 RX ORDER — ACETAMINOPHEN 325 MG/1
650 TABLET ORAL ONCE
Status: COMPLETED | OUTPATIENT
Start: 2019-11-11 | End: 2019-11-11

## 2019-11-11 RX ORDER — SODIUM CHLORIDE 9 MG/ML
250 INJECTION, SOLUTION INTRAVENOUS AS NEEDED
Status: DISCONTINUED | OUTPATIENT
Start: 2019-11-11 | End: 2019-11-11 | Stop reason: HOSPADM

## 2019-11-11 RX ADMIN — DIPHENHYDRAMINE HYDROCHLORIDE 25 MG: 50 INJECTION, SOLUTION INTRAMUSCULAR; INTRAVENOUS at 09:57

## 2019-11-11 RX ADMIN — SODIUM CHLORIDE 250 ML: 9 INJECTION, SOLUTION INTRAVENOUS at 09:50

## 2019-11-11 RX ADMIN — ACETAMINOPHEN 650 MG: 325 TABLET, FILM COATED ORAL at 09:57

## 2019-11-15 ENCOUNTER — CLINICAL SUPPORT (OUTPATIENT)
Dept: ONCOLOGY | Facility: CLINIC | Age: 79
End: 2019-11-15

## 2019-11-15 ENCOUNTER — LAB (OUTPATIENT)
Dept: LAB | Facility: HOSPITAL | Age: 79
End: 2019-11-15

## 2019-11-15 ENCOUNTER — APPOINTMENT (OUTPATIENT)
Dept: ONCOLOGY | Facility: HOSPITAL | Age: 79
End: 2019-11-15

## 2019-11-15 DIAGNOSIS — D46.9 MDS (MYELODYSPLASTIC SYNDROME) (HCC): Primary | ICD-10-CM

## 2019-11-15 DIAGNOSIS — D46.9 MDS (MYELODYSPLASTIC SYNDROME) (HCC): ICD-10-CM

## 2019-11-15 LAB
BASOPHILS # BLD AUTO: 0.03 10*3/MM3 (ref 0–0.2)
BASOPHILS NFR BLD AUTO: 0.5 % (ref 0–1.5)
DEPRECATED RDW RBC AUTO: 78.4 FL (ref 37–54)
EOSINOPHIL # BLD AUTO: 0.05 10*3/MM3 (ref 0–0.4)
EOSINOPHIL NFR BLD AUTO: 0.8 % (ref 0.3–6.2)
ERYTHROCYTE [DISTWIDTH] IN BLOOD BY AUTOMATED COUNT: 21.5 % (ref 12.3–15.4)
HCT VFR BLD AUTO: 29.2 % (ref 37.5–51)
HGB BLD-MCNC: 9.2 G/DL (ref 13–17.7)
HOLD SPECIMEN: NORMAL
IMM GRANULOCYTES # BLD AUTO: 0.05 10*3/MM3 (ref 0–0.05)
IMM GRANULOCYTES NFR BLD AUTO: 0.8 % (ref 0–0.5)
LYMPHOCYTES # BLD AUTO: 0.66 10*3/MM3 (ref 0.7–3.1)
LYMPHOCYTES NFR BLD AUTO: 10.6 % (ref 19.6–45.3)
MCH RBC QN AUTO: 31.8 PG (ref 26.6–33)
MCHC RBC AUTO-ENTMCNC: 31.5 G/DL (ref 31.5–35.7)
MCV RBC AUTO: 101 FL (ref 79–97)
MONOCYTES # BLD AUTO: 0.48 10*3/MM3 (ref 0.1–0.9)
MONOCYTES NFR BLD AUTO: 7.7 % (ref 5–12)
NEUTROPHILS # BLD AUTO: 4.98 10*3/MM3 (ref 1.7–7)
NEUTROPHILS NFR BLD AUTO: 79.6 % (ref 42.7–76)
NRBC BLD AUTO-RTO: 0 /100 WBC (ref 0–0.2)
PLATELET # BLD AUTO: 128 10*3/MM3 (ref 140–450)
PMV BLD AUTO: 13 FL (ref 6–12)
RBC # BLD AUTO: 2.89 10*6/MM3 (ref 4.14–5.8)
WBC NRBC COR # BLD: 6.25 10*3/MM3 (ref 3.4–10.8)

## 2019-11-15 PROCEDURE — 99211 OFF/OP EST MAY X REQ PHY/QHP: CPT | Performed by: INTERNAL MEDICINE

## 2019-11-15 PROCEDURE — 85025 COMPLETE CBC W/AUTO DIFF WBC: CPT | Performed by: INTERNAL MEDICINE

## 2019-11-15 PROCEDURE — 36415 COLL VENOUS BLD VENIPUNCTURE: CPT

## 2019-11-15 NOTE — PROGRESS NOTES
Patient here for lab evaluation for possible Procrit for MDS.  Patient states that he is feeling ok, just gets tired with exertion.  Skin w/d to touch.  Color wnl.  Eating and drinking well.  Reviewed labs with patient.  Patient is to receive Procrit 40,000 units SQ for Hgb <12 and Hct <36.  Patient states that he has tolerated the injections well without side effects.  Hgb 9.2 and Hct 29.2-patient qualifies for a Procrit injection today.  Instructed patient that we will have to get injection approved with his insurance and that this may take some time.  Patient will to infusion center waiting room and I instructed that someone will let him know if insurance has approved it.  Instructed patient that this may take some time and if we see it is going to take too long will let him know so that we can reschedule.  Patient sravan/adithya Rushing was on the phone with his insurance for over an hour when she got the approval.  Patient became upset during this time and left prior to getting injection.  Have tried to call patient and reach voicemail each time.

## 2019-11-22 ENCOUNTER — INFUSION (OUTPATIENT)
Dept: ONCOLOGY | Facility: HOSPITAL | Age: 79
End: 2019-11-22

## 2019-11-22 ENCOUNTER — LAB (OUTPATIENT)
Dept: LAB | Facility: HOSPITAL | Age: 79
End: 2019-11-22

## 2019-11-22 VITALS
OXYGEN SATURATION: 94 % | RESPIRATION RATE: 20 BRPM | HEART RATE: 97 BPM | TEMPERATURE: 98 F | SYSTOLIC BLOOD PRESSURE: 124 MMHG | DIASTOLIC BLOOD PRESSURE: 75 MMHG

## 2019-11-22 DIAGNOSIS — D46.9 MDS (MYELODYSPLASTIC SYNDROME) (HCC): Primary | ICD-10-CM

## 2019-11-22 LAB
BASOPHILS # BLD AUTO: 0.02 10*3/MM3 (ref 0–0.2)
BASOPHILS NFR BLD AUTO: 0.4 % (ref 0–1.5)
DEPRECATED RDW RBC AUTO: 72.4 FL (ref 37–54)
EOSINOPHIL # BLD AUTO: 0.06 10*3/MM3 (ref 0–0.4)
EOSINOPHIL NFR BLD AUTO: 1.1 % (ref 0.3–6.2)
ERYTHROCYTE [DISTWIDTH] IN BLOOD BY AUTOMATED COUNT: 20.8 % (ref 12.3–15.4)
HCT VFR BLD AUTO: 24.7 % (ref 37.5–51)
HGB BLD-MCNC: 7.8 G/DL (ref 13–17.7)
HOLD SPECIMEN: NORMAL
LYMPHOCYTES # BLD AUTO: 0.81 10*3/MM3 (ref 0.7–3.1)
LYMPHOCYTES NFR BLD AUTO: 15.2 % (ref 19.6–45.3)
MCH RBC QN AUTO: 31 PG (ref 26.6–33)
MCHC RBC AUTO-ENTMCNC: 31.6 G/DL (ref 31.5–35.7)
MCV RBC AUTO: 98 FL (ref 79–97)
MONOCYTES # BLD AUTO: 0.48 10*3/MM3 (ref 0.1–0.9)
MONOCYTES NFR BLD AUTO: 9 % (ref 5–12)
NEUTROPHILS # BLD AUTO: 3.9 10*3/MM3 (ref 1.7–7)
NEUTROPHILS NFR BLD AUTO: 73.4 % (ref 42.7–76)
PLATELET # BLD AUTO: 114 10*3/MM3 (ref 140–450)
PMV BLD AUTO: 13 FL (ref 6–12)
RBC # BLD AUTO: 2.52 10*6/MM3 (ref 4.14–5.8)
WBC NRBC COR # BLD: 5.32 10*3/MM3 (ref 3.4–10.8)

## 2019-11-22 PROCEDURE — 36415 COLL VENOUS BLD VENIPUNCTURE: CPT

## 2019-11-22 PROCEDURE — 96372 THER/PROPH/DIAG INJ SC/IM: CPT

## 2019-11-22 PROCEDURE — 85025 COMPLETE CBC W/AUTO DIFF WBC: CPT | Performed by: INTERNAL MEDICINE

## 2019-11-22 PROCEDURE — 25010000002 EPOETIN ALFA-EPBX 40000 UNIT/ML SOLUTION: Performed by: INTERNAL MEDICINE

## 2019-11-22 RX ADMIN — EPOETIN ALFA-EPBX 40000 UNITS: 40000 INJECTION, SOLUTION INTRAVENOUS; SUBCUTANEOUS at 12:51

## 2019-11-26 ENCOUNTER — INFUSION (OUTPATIENT)
Dept: ONCOLOGY | Facility: HOSPITAL | Age: 79
End: 2019-11-26

## 2019-11-26 ENCOUNTER — TELEPHONE (OUTPATIENT)
Dept: ONCOLOGY | Facility: CLINIC | Age: 79
End: 2019-11-26

## 2019-11-26 ENCOUNTER — APPOINTMENT (OUTPATIENT)
Dept: LAB | Facility: HOSPITAL | Age: 79
End: 2019-11-26

## 2019-11-26 VITALS
DIASTOLIC BLOOD PRESSURE: 71 MMHG | SYSTOLIC BLOOD PRESSURE: 139 MMHG | TEMPERATURE: 97.5 F | RESPIRATION RATE: 18 BRPM | HEART RATE: 65 BPM | OXYGEN SATURATION: 100 %

## 2019-11-26 DIAGNOSIS — D46.9 MDS (MYELODYSPLASTIC SYNDROME) (HCC): ICD-10-CM

## 2019-11-26 DIAGNOSIS — D46.9 MDS (MYELODYSPLASTIC SYNDROME) (HCC): Primary | ICD-10-CM

## 2019-11-26 PROCEDURE — 86901 BLOOD TYPING SEROLOGIC RH(D): CPT | Performed by: INTERNAL MEDICINE

## 2019-11-26 PROCEDURE — 36430 TRANSFUSION BLD/BLD COMPNT: CPT

## 2019-11-26 PROCEDURE — 86900 BLOOD TYPING SEROLOGIC ABO: CPT | Performed by: INTERNAL MEDICINE

## 2019-11-26 PROCEDURE — 86923 COMPATIBILITY TEST ELECTRIC: CPT

## 2019-11-26 PROCEDURE — 25010000002 DIPHENHYDRAMINE PER 50 MG: Performed by: INTERNAL MEDICINE

## 2019-11-26 PROCEDURE — P9016 RBC LEUKOCYTES REDUCED: HCPCS

## 2019-11-26 PROCEDURE — 96374 THER/PROPH/DIAG INJ IV PUSH: CPT

## 2019-11-26 PROCEDURE — 86850 RBC ANTIBODY SCREEN: CPT | Performed by: INTERNAL MEDICINE

## 2019-11-26 PROCEDURE — 86900 BLOOD TYPING SEROLOGIC ABO: CPT

## 2019-11-26 RX ORDER — DIPHENHYDRAMINE HYDROCHLORIDE 50 MG/ML
25 INJECTION INTRAMUSCULAR; INTRAVENOUS ONCE
Status: COMPLETED | OUTPATIENT
Start: 2019-11-26 | End: 2019-11-26

## 2019-11-26 RX ORDER — ACETAMINOPHEN 325 MG/1
650 TABLET ORAL ONCE
Status: COMPLETED | OUTPATIENT
Start: 2019-11-26 | End: 2019-11-26

## 2019-11-26 RX ORDER — DIPHENHYDRAMINE HYDROCHLORIDE 50 MG/ML
25 INJECTION INTRAMUSCULAR; INTRAVENOUS ONCE
Status: CANCELLED | OUTPATIENT
Start: 2019-11-26 | End: 2019-11-26

## 2019-11-26 RX ORDER — FUROSEMIDE 10 MG/ML
20 INJECTION INTRAMUSCULAR; INTRAVENOUS ONCE
Status: CANCELLED | OUTPATIENT
Start: 2019-11-26 | End: 2019-11-26

## 2019-11-26 RX ORDER — FUROSEMIDE 10 MG/ML
20 INJECTION INTRAMUSCULAR; INTRAVENOUS ONCE
Status: DISCONTINUED | OUTPATIENT
Start: 2019-11-26 | End: 2019-11-26 | Stop reason: HOSPADM

## 2019-11-26 RX ORDER — ACETAMINOPHEN 325 MG/1
650 TABLET ORAL ONCE
Status: CANCELLED | OUTPATIENT
Start: 2019-11-26 | End: 2019-11-26

## 2019-11-26 RX ORDER — SODIUM CHLORIDE 9 MG/ML
250 INJECTION, SOLUTION INTRAVENOUS AS NEEDED
Status: CANCELLED | OUTPATIENT
Start: 2019-11-26

## 2019-11-26 RX ORDER — SODIUM CHLORIDE 9 MG/ML
250 INJECTION, SOLUTION INTRAVENOUS AS NEEDED
Status: DISCONTINUED | OUTPATIENT
Start: 2019-11-26 | End: 2019-11-26 | Stop reason: HOSPADM

## 2019-11-26 RX ADMIN — ACETAMINOPHEN 650 MG: 325 TABLET, FILM COATED ORAL at 10:54

## 2019-11-26 RX ADMIN — DIPHENHYDRAMINE HYDROCHLORIDE 25 MG: 50 INJECTION, SOLUTION INTRAMUSCULAR; INTRAVENOUS at 10:54

## 2019-11-26 NOTE — TELEPHONE ENCOUNTER
----- Message from Bossman Martinez MD sent at 11/22/2019  1:07 PM CST -----  Ordered 2 units packed RBCs if symptomatic.  Premed Tylenol 500 mg p.o. and Benadryl 25 g IV.  Lasix 20 mill grams IV push after each unit of blood.

## 2019-11-26 NOTE — TELEPHONE ENCOUNTER
Tried to call patient for multiple days.  Reached wife this am.  Instructed that her husbands blood count was 7.8 on Friday and that Dr Martinez would like for him to get 2 units of blood.  Wife states that patient is very weak and tired, states that he would like to come today to get it if possible.  Talked to Joni in scheduling-states if patient comes now, they can do it today.  Instructed wife of this and she states that she will get him there now.

## 2019-11-29 ENCOUNTER — APPOINTMENT (OUTPATIENT)
Dept: LAB | Facility: HOSPITAL | Age: 79
End: 2019-11-29

## 2019-12-06 ENCOUNTER — LAB (OUTPATIENT)
Dept: LAB | Facility: HOSPITAL | Age: 79
End: 2019-12-06

## 2019-12-06 ENCOUNTER — INFUSION (OUTPATIENT)
Dept: ONCOLOGY | Facility: HOSPITAL | Age: 79
End: 2019-12-06

## 2019-12-06 VITALS
SYSTOLIC BLOOD PRESSURE: 119 MMHG | DIASTOLIC BLOOD PRESSURE: 52 MMHG | OXYGEN SATURATION: 92 % | HEART RATE: 85 BPM | TEMPERATURE: 99 F

## 2019-12-06 DIAGNOSIS — D46.9 MDS (MYELODYSPLASTIC SYNDROME) (HCC): Primary | ICD-10-CM

## 2019-12-06 DIAGNOSIS — D50.9 IRON DEFICIENCY ANEMIA, UNSPECIFIED IRON DEFICIENCY ANEMIA TYPE: ICD-10-CM

## 2019-12-06 DIAGNOSIS — D46.9 MDS (MYELODYSPLASTIC SYNDROME) (HCC): ICD-10-CM

## 2019-12-06 DIAGNOSIS — D46.9 MYELODYSPLASTIC SYNDROME (HCC): ICD-10-CM

## 2019-12-06 DIAGNOSIS — D64.9 ANEMIA, UNSPECIFIED TYPE: ICD-10-CM

## 2019-12-06 LAB
ALBUMIN SERPL-MCNC: 3.1 G/DL (ref 3.5–5.2)
ALBUMIN/GLOB SERPL: 0.7 G/DL
ALP SERPL-CCNC: 227 U/L (ref 39–117)
ALT SERPL W P-5'-P-CCNC: 40 U/L (ref 1–41)
ANION GAP SERPL CALCULATED.3IONS-SCNC: 9 MMOL/L (ref 5–15)
AST SERPL-CCNC: 43 U/L (ref 1–40)
BASOPHILS # BLD AUTO: 0.02 10*3/MM3 (ref 0–0.2)
BASOPHILS NFR BLD AUTO: 0.4 % (ref 0–1.5)
BILIRUB SERPL-MCNC: 1.2 MG/DL (ref 0.2–1.2)
BUN BLD-MCNC: 17 MG/DL (ref 8–23)
BUN/CREAT SERPL: 18.1 (ref 7–25)
CALCIUM SPEC-SCNC: 8.7 MG/DL (ref 8.6–10.5)
CHLORIDE SERPL-SCNC: 94 MMOL/L (ref 98–107)
CO2 SERPL-SCNC: 30 MMOL/L (ref 22–29)
CREAT BLD-MCNC: 0.94 MG/DL (ref 0.76–1.27)
DEPRECATED RDW RBC AUTO: 69 FL (ref 37–54)
EOSINOPHIL # BLD AUTO: 0.05 10*3/MM3 (ref 0–0.4)
EOSINOPHIL NFR BLD AUTO: 1 % (ref 0.3–6.2)
ERYTHROCYTE [DISTWIDTH] IN BLOOD BY AUTOMATED COUNT: 20.9 % (ref 12.3–15.4)
FERRITIN SERPL-MCNC: 3611 NG/ML (ref 30–400)
GFR SERPL CREATININE-BSD FRML MDRD: 77 ML/MIN/1.73
GLOBULIN UR ELPH-MCNC: 4.3 GM/DL
GLUCOSE BLD-MCNC: 112 MG/DL (ref 65–99)
HCT VFR BLD AUTO: 27.9 % (ref 37.5–51)
HGB BLD-MCNC: 8.9 G/DL (ref 13–17.7)
IRON 24H UR-MRATE: 39 MCG/DL (ref 59–158)
IRON SATN MFR SERPL: 26 % (ref 20–50)
LYMPHOCYTES # BLD AUTO: 0.97 10*3/MM3 (ref 0.7–3.1)
LYMPHOCYTES NFR BLD AUTO: 19.2 % (ref 19.6–45.3)
MCH RBC QN AUTO: 30.1 PG (ref 26.6–33)
MCHC RBC AUTO-ENTMCNC: 31.9 G/DL (ref 31.5–35.7)
MCV RBC AUTO: 94.3 FL (ref 79–97)
MONOCYTES # BLD AUTO: 0.54 10*3/MM3 (ref 0.1–0.9)
MONOCYTES NFR BLD AUTO: 10.7 % (ref 5–12)
NEUTROPHILS # BLD AUTO: 3.42 10*3/MM3 (ref 1.7–7)
NEUTROPHILS NFR BLD AUTO: 67.7 % (ref 42.7–76)
PLATELET # BLD AUTO: 86 10*3/MM3 (ref 140–450)
PMV BLD AUTO: 12.7 FL (ref 6–12)
POTASSIUM BLD-SCNC: 3.9 MMOL/L (ref 3.5–5.2)
PROT SERPL-MCNC: 7.4 G/DL (ref 6–8.5)
RBC # BLD AUTO: 2.96 10*6/MM3 (ref 4.14–5.8)
SODIUM BLD-SCNC: 133 MMOL/L (ref 136–145)
TIBC SERPL-MCNC: 149 MCG/DL (ref 298–536)
TRANSFERRIN SERPL-MCNC: 100 MG/DL (ref 200–360)
WBC NRBC COR # BLD: 5.05 10*3/MM3 (ref 3.4–10.8)

## 2019-12-06 PROCEDURE — 80053 COMPREHEN METABOLIC PANEL: CPT | Performed by: INTERNAL MEDICINE

## 2019-12-06 PROCEDURE — 84466 ASSAY OF TRANSFERRIN: CPT | Performed by: INTERNAL MEDICINE

## 2019-12-06 PROCEDURE — 96372 THER/PROPH/DIAG INJ SC/IM: CPT

## 2019-12-06 PROCEDURE — 85025 COMPLETE CBC W/AUTO DIFF WBC: CPT | Performed by: INTERNAL MEDICINE

## 2019-12-06 PROCEDURE — 36415 COLL VENOUS BLD VENIPUNCTURE: CPT

## 2019-12-06 PROCEDURE — 82728 ASSAY OF FERRITIN: CPT | Performed by: INTERNAL MEDICINE

## 2019-12-06 PROCEDURE — 25010000002 EPOETIN ALFA PER 1000 UNITS: Performed by: INTERNAL MEDICINE

## 2019-12-06 PROCEDURE — 83540 ASSAY OF IRON: CPT | Performed by: INTERNAL MEDICINE

## 2019-12-06 RX ADMIN — ERYTHROPOIETIN 40000 UNITS: 40000 INJECTION, SOLUTION INTRAVENOUS; SUBCUTANEOUS at 13:14

## 2019-12-08 ENCOUNTER — APPOINTMENT (OUTPATIENT)
Dept: CT IMAGING | Facility: HOSPITAL | Age: 79
End: 2019-12-08

## 2019-12-08 ENCOUNTER — HOSPITAL ENCOUNTER (INPATIENT)
Facility: HOSPITAL | Age: 79
LOS: 2 days | Discharge: HOME | End: 2019-12-10
Attending: EMERGENCY MEDICINE | Admitting: FAMILY MEDICINE

## 2019-12-08 DIAGNOSIS — J18.9 PNEUMONIA OF RIGHT LUNG DUE TO INFECTIOUS ORGANISM, UNSPECIFIED PART OF LUNG: Primary | ICD-10-CM

## 2019-12-08 DIAGNOSIS — Z74.09 IMPAIRED MOBILITY: ICD-10-CM

## 2019-12-08 LAB
ALBUMIN SERPL-MCNC: 2.9 G/DL (ref 3.5–5.2)
ALBUMIN/GLOB SERPL: 0.7 G/DL
ALP SERPL-CCNC: 210 U/L (ref 39–117)
ALT SERPL W P-5'-P-CCNC: 40 U/L (ref 1–41)
ANION GAP SERPL CALCULATED.3IONS-SCNC: 9 MMOL/L (ref 5–15)
ARTERIAL PATENCY WRIST A: ABNORMAL
AST SERPL-CCNC: 36 U/L (ref 1–40)
ATMOSPHERIC PRESS: 749 MMHG
BASE EXCESS BLDA CALC-SCNC: 4 MMOL/L (ref 0–2)
BASOPHILS # BLD AUTO: 0.02 10*3/MM3 (ref 0–0.2)
BASOPHILS NFR BLD AUTO: 0.4 % (ref 0–1.5)
BDY SITE: ABNORMAL
BILIRUB SERPL-MCNC: 0.8 MG/DL (ref 0.2–1.2)
BODY TEMPERATURE: 37 C
BUN BLD-MCNC: 15 MG/DL (ref 8–23)
BUN/CREAT SERPL: 16.9 (ref 7–25)
CALCIUM SPEC-SCNC: 8.9 MG/DL (ref 8.6–10.5)
CHLORIDE SERPL-SCNC: 96 MMOL/L (ref 98–107)
CO2 SERPL-SCNC: 29 MMOL/L (ref 22–29)
CREAT BLD-MCNC: 0.89 MG/DL (ref 0.76–1.27)
D-LACTATE SERPL-SCNC: 0.9 MMOL/L (ref 0.5–2)
DEPRECATED RDW RBC AUTO: 69.1 FL (ref 37–54)
EOSINOPHIL # BLD AUTO: 0.06 10*3/MM3 (ref 0–0.4)
EOSINOPHIL NFR BLD AUTO: 1.3 % (ref 0.3–6.2)
ERYTHROCYTE [DISTWIDTH] IN BLOOD BY AUTOMATED COUNT: 21.1 % (ref 12.3–15.4)
GFR SERPL CREATININE-BSD FRML MDRD: 82 ML/MIN/1.73
GLOBULIN UR ELPH-MCNC: 4.3 GM/DL
GLUCOSE BLD-MCNC: 113 MG/DL (ref 65–99)
HCO3 BLDA-SCNC: 28.7 MMOL/L (ref 20–26)
HCT VFR BLD AUTO: 27.8 % (ref 37.5–51)
HGB BLD-MCNC: 8.9 G/DL (ref 13–17.7)
HOROWITZ INDEX BLD+IHG-RTO: 21 %
LYMPHOCYTES # BLD AUTO: 0.99 10*3/MM3 (ref 0.7–3.1)
LYMPHOCYTES NFR BLD AUTO: 22.1 % (ref 19.6–45.3)
Lab: ABNORMAL
MCH RBC QN AUTO: 30.5 PG (ref 26.6–33)
MCHC RBC AUTO-ENTMCNC: 32 G/DL (ref 31.5–35.7)
MCV RBC AUTO: 95.2 FL (ref 79–97)
MODALITY: ABNORMAL
MONOCYTES # BLD AUTO: 0.51 10*3/MM3 (ref 0.1–0.9)
MONOCYTES NFR BLD AUTO: 11.4 % (ref 5–12)
NEUTROPHILS # BLD AUTO: 2.86 10*3/MM3 (ref 1.7–7)
NEUTROPHILS NFR BLD AUTO: 64.1 % (ref 42.7–76)
NT-PROBNP SERPL-MCNC: 870.6 PG/ML (ref 5–1800)
PCO2 BLDA: 43 MM HG (ref 35–45)
PH BLDA: 7.43 PH UNITS (ref 7.35–7.45)
PLATELET # BLD AUTO: 89 10*3/MM3 (ref 140–450)
PMV BLD AUTO: 13.4 FL (ref 6–12)
PO2 BLDA: 56.8 MM HG (ref 83–108)
POTASSIUM BLD-SCNC: 4 MMOL/L (ref 3.5–5.2)
PROT SERPL-MCNC: 7.2 G/DL (ref 6–8.5)
RBC # BLD AUTO: 2.92 10*6/MM3 (ref 4.14–5.8)
SAO2 % BLDCOA: 90 % (ref 94–99)
SODIUM BLD-SCNC: 134 MMOL/L (ref 136–145)
T4 FREE SERPL-MCNC: 1.74 NG/DL (ref 0.93–1.7)
TROPONIN T SERPL-MCNC: <0.01 NG/ML (ref 0–0.03)
TSH SERPL DL<=0.05 MIU/L-ACNC: 0.19 UIU/ML (ref 0.27–4.2)
VENTILATOR MODE: ABNORMAL
WBC NRBC COR # BLD: 4.47 10*3/MM3 (ref 3.4–10.8)

## 2019-12-08 PROCEDURE — 25010000002 ONDANSETRON PER 1 MG: Performed by: EMERGENCY MEDICINE

## 2019-12-08 PROCEDURE — 74177 CT ABD & PELVIS W/CONTRAST: CPT

## 2019-12-08 PROCEDURE — 87040 BLOOD CULTURE FOR BACTERIA: CPT | Performed by: EMERGENCY MEDICINE

## 2019-12-08 PROCEDURE — 71275 CT ANGIOGRAPHY CHEST: CPT

## 2019-12-08 PROCEDURE — 94799 UNLISTED PULMONARY SVC/PX: CPT

## 2019-12-08 PROCEDURE — 93010 ELECTROCARDIOGRAM REPORT: CPT | Performed by: INTERNAL MEDICINE

## 2019-12-08 PROCEDURE — 63710000001 DRONABINOL PER 2.5 MG: Performed by: FAMILY MEDICINE

## 2019-12-08 PROCEDURE — 84484 ASSAY OF TROPONIN QUANT: CPT | Performed by: EMERGENCY MEDICINE

## 2019-12-08 PROCEDURE — 25010000002 FENTANYL CITRATE (PF) 100 MCG/2ML SOLUTION: Performed by: EMERGENCY MEDICINE

## 2019-12-08 PROCEDURE — 85025 COMPLETE CBC W/AUTO DIFF WBC: CPT | Performed by: EMERGENCY MEDICINE

## 2019-12-08 PROCEDURE — 94760 N-INVAS EAR/PLS OXIMETRY 1: CPT

## 2019-12-08 PROCEDURE — 87081 CULTURE SCREEN ONLY: CPT | Performed by: FAMILY MEDICINE

## 2019-12-08 PROCEDURE — 25010000002 LEVOFLOXACIN PER 250 MG: Performed by: FAMILY MEDICINE

## 2019-12-08 PROCEDURE — 83880 ASSAY OF NATRIURETIC PEPTIDE: CPT | Performed by: EMERGENCY MEDICINE

## 2019-12-08 PROCEDURE — 36415 COLL VENOUS BLD VENIPUNCTURE: CPT

## 2019-12-08 PROCEDURE — 0 IOPAMIDOL PER 1 ML: Performed by: EMERGENCY MEDICINE

## 2019-12-08 PROCEDURE — 80053 COMPREHEN METABOLIC PANEL: CPT | Performed by: EMERGENCY MEDICINE

## 2019-12-08 PROCEDURE — 84443 ASSAY THYROID STIM HORMONE: CPT | Performed by: FAMILY MEDICINE

## 2019-12-08 PROCEDURE — 70450 CT HEAD/BRAIN W/O DYE: CPT

## 2019-12-08 PROCEDURE — 25010000002 AZITHROMYCIN PER 500 MG: Performed by: EMERGENCY MEDICINE

## 2019-12-08 PROCEDURE — 82803 BLOOD GASES ANY COMBINATION: CPT

## 2019-12-08 PROCEDURE — 25010000002 PIPERACILLIN SOD-TAZOBACTAM PER 1 G: Performed by: EMERGENCY MEDICINE

## 2019-12-08 PROCEDURE — 99284 EMERGENCY DEPT VISIT MOD MDM: CPT

## 2019-12-08 PROCEDURE — 36600 WITHDRAWAL OF ARTERIAL BLOOD: CPT

## 2019-12-08 PROCEDURE — 84439 ASSAY OF FREE THYROXINE: CPT | Performed by: FAMILY MEDICINE

## 2019-12-08 PROCEDURE — 93005 ELECTROCARDIOGRAM TRACING: CPT | Performed by: EMERGENCY MEDICINE

## 2019-12-08 PROCEDURE — 83605 ASSAY OF LACTIC ACID: CPT | Performed by: EMERGENCY MEDICINE

## 2019-12-08 PROCEDURE — 93005 ELECTROCARDIOGRAM TRACING: CPT

## 2019-12-08 RX ORDER — ACETAMINOPHEN 650 MG/1
650 SUPPOSITORY RECTAL EVERY 4 HOURS PRN
Status: DISCONTINUED | OUTPATIENT
Start: 2019-12-08 | End: 2019-12-10 | Stop reason: HOSPADM

## 2019-12-08 RX ORDER — LEVOFLOXACIN 5 MG/ML
750 INJECTION, SOLUTION INTRAVENOUS EVERY 24 HOURS
Status: DISCONTINUED | OUTPATIENT
Start: 2019-12-08 | End: 2019-12-10

## 2019-12-08 RX ORDER — SODIUM CHLORIDE 0.9 % (FLUSH) 0.9 %
10 SYRINGE (ML) INJECTION AS NEEDED
Status: DISCONTINUED | OUTPATIENT
Start: 2019-12-08 | End: 2019-12-10 | Stop reason: HOSPADM

## 2019-12-08 RX ORDER — ACETAMINOPHEN 325 MG/1
650 TABLET ORAL EVERY 4 HOURS PRN
Status: DISCONTINUED | OUTPATIENT
Start: 2019-12-08 | End: 2019-12-10 | Stop reason: HOSPADM

## 2019-12-08 RX ORDER — ESCITALOPRAM OXALATE 10 MG/1
10 TABLET ORAL DAILY
COMMUNITY

## 2019-12-08 RX ORDER — SODIUM CHLORIDE 9 MG/ML
50 INJECTION, SOLUTION INTRAVENOUS CONTINUOUS
Status: DISCONTINUED | OUTPATIENT
Start: 2019-12-08 | End: 2019-12-10

## 2019-12-08 RX ORDER — SODIUM CHLORIDE 0.9 % (FLUSH) 0.9 %
10 SYRINGE (ML) INJECTION EVERY 12 HOURS SCHEDULED
Status: DISCONTINUED | OUTPATIENT
Start: 2019-12-08 | End: 2019-12-10 | Stop reason: HOSPADM

## 2019-12-08 RX ORDER — DRONABINOL 2.5 MG/1
2.5 CAPSULE ORAL 2 TIMES DAILY
Status: DISCONTINUED | OUTPATIENT
Start: 2019-12-08 | End: 2019-12-10 | Stop reason: HOSPADM

## 2019-12-08 RX ORDER — FENTANYL CITRATE 50 UG/ML
50 INJECTION, SOLUTION INTRAMUSCULAR; INTRAVENOUS ONCE
Status: COMPLETED | OUTPATIENT
Start: 2019-12-08 | End: 2019-12-08

## 2019-12-08 RX ORDER — ACETAMINOPHEN 160 MG/5ML
650 SOLUTION ORAL EVERY 4 HOURS PRN
Status: DISCONTINUED | OUTPATIENT
Start: 2019-12-08 | End: 2019-12-10 | Stop reason: HOSPADM

## 2019-12-08 RX ORDER — FAMOTIDINE 20 MG/1
40 TABLET, FILM COATED ORAL DAILY
Status: DISCONTINUED | OUTPATIENT
Start: 2019-12-09 | End: 2019-12-10 | Stop reason: HOSPADM

## 2019-12-08 RX ORDER — ONDANSETRON 2 MG/ML
4 INJECTION INTRAMUSCULAR; INTRAVENOUS ONCE
Status: COMPLETED | OUTPATIENT
Start: 2019-12-08 | End: 2019-12-08

## 2019-12-08 RX ADMIN — DRONABINOL 2.5 MG: 2.5 CAPSULE ORAL at 22:52

## 2019-12-08 RX ADMIN — PIPERACILLIN AND TAZOBACTAM 4.5 G: 4; .5 INJECTION, POWDER, LYOPHILIZED, FOR SOLUTION INTRAVENOUS; PARENTERAL at 18:47

## 2019-12-08 RX ADMIN — FENTANYL CITRATE 50 MCG: 50 INJECTION INTRAMUSCULAR; INTRAVENOUS at 17:13

## 2019-12-08 RX ADMIN — ACETAMINOPHEN 650 MG: 325 TABLET, FILM COATED ORAL at 22:52

## 2019-12-08 RX ADMIN — AZITHROMYCIN MONOHYDRATE 500 MG: 500 INJECTION, POWDER, LYOPHILIZED, FOR SOLUTION INTRAVENOUS at 21:05

## 2019-12-08 RX ADMIN — SODIUM CHLORIDE, PRESERVATIVE FREE 10 ML: 5 INJECTION INTRAVENOUS at 22:45

## 2019-12-08 RX ADMIN — SODIUM CHLORIDE 1000 ML: 9 INJECTION, SOLUTION INTRAVENOUS at 17:21

## 2019-12-08 RX ADMIN — LEVOFLOXACIN 750 MG: 5 INJECTION, SOLUTION INTRAVENOUS at 22:41

## 2019-12-08 RX ADMIN — ONDANSETRON HYDROCHLORIDE 4 MG: 2 SOLUTION INTRAMUSCULAR; INTRAVENOUS at 17:11

## 2019-12-08 RX ADMIN — SODIUM CHLORIDE 50 ML/HR: 9 INJECTION, SOLUTION INTRAVENOUS at 22:41

## 2019-12-08 RX ADMIN — IOPAMIDOL 100 ML: 755 INJECTION, SOLUTION INTRAVENOUS at 17:34

## 2019-12-08 NOTE — ED PROVIDER NOTES
Subjective   79-year-old with mesothelioma no chemotherapy complaining of chronic chest wall pain abdominal wall pain was getting worse his wife states that she is not happy with the fact that his oncologist are not informing her as to what the plan would be      Abdominal Pain   Pain location:  Generalized  Pain quality: aching and bloating    Pain radiates to:  Does not radiate  Pain severity:  Moderate  Onset quality:  Gradual  Timing:  Constant  Progression:  Worsening  Chronicity:  New  Context: not alcohol use, not diet changes, not eating, not previous surgeries, not recent illness, not sick contacts, not suspicious food intake and not trauma    Relieved by:  Nothing  Worsened by:  Nothing  Ineffective treatments:  None tried  Associated symptoms: chest pain    Associated symptoms: no anorexia, no chills, no cough, no fatigue, no fever, no nausea, no shortness of breath and no vomiting    Risk factors: recent hospitalization    Risk factors: no aspirin use    Chest Pain   Pain location:  R chest  Pain quality: sharp and stabbing    Pain radiates to:  Does not radiate  Pain severity:  Moderate  Onset quality:  Gradual  Timing:  Constant  Progression:  Worsening  Chronicity:  New  Context: not breathing, not drug use, not lifting and not at rest    Relieved by:  Nothing  Worsened by:  Nothing  Ineffective treatments:  None tried  Associated symptoms: abdominal pain    Associated symptoms: no altered mental status, no anorexia, no anxiety, no claudication, no cough, no dizziness, no fatigue, no fever, no headache, no nausea, no near-syncope, no orthopnea, no palpitations, no PND, no shortness of breath, no syncope, no vomiting and no weakness    Risk factors: hypertension    Risk factors: no aortic disease, no birth control, no immobilization, not male and no prior DVT/PE        Review of Systems   Constitutional: Negative.  Negative for chills, fatigue and fever.   HENT: Negative.  Negative for congestion.     Respiratory: Negative.  Negative for cough, chest tightness, shortness of breath and stridor.    Cardiovascular: Positive for chest pain. Negative for palpitations, orthopnea, claudication, syncope, PND and near-syncope.   Gastrointestinal: Positive for abdominal pain. Negative for abdominal distention, anorexia, nausea and vomiting.   Endocrine: Negative.    Genitourinary: Negative.  Negative for difficulty urinating and flank pain.   Musculoskeletal: Negative.    Skin: Negative.  Negative for color change.   Neurological: Negative.  Negative for dizziness, weakness and headaches.   All other systems reviewed and are negative.      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   • Cervical spine fracture (CMS/[facility])     Fracture of neck, unspecified, sequela   • COPD (chronic obstructive pulmonary disease) (CMS/[facility])    • Coronary artery disease    • Depression     Major depressive disorder, single episode, unspecified   • Disease of thyroid gland    • Enlarged prostate     Enlarged prostate without lower urinary tract symptoms    • Gastritis     Gastritis, unspecified, without bleeding   • Hearing aid worn    • Hypercholesteremia    • Hypertension     Essential (primary) hypertension   • Hypertensive heart disease     Hypertensive heart disease without heart failure   • Intestinal malabsorption     unspecified   • Iron deficiency anemia     unspecified   • Leukemia (CMS/[facility])    • Macular degeneration (senile) of retina    • Orthostatic hypotension    • Osteoarthritis     Unspecified osteoarthritis, unspecified site   • Osteopenia     Other specified disorders of bone density and structure, unspecified site   • Postsurgical hypothyroidism     Postprocedural hypothyroidism   • 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 Malignant neoplasm of thyroid gland    • Unexplained weight loss     Abnormal weight loss   • Vitamin B12 deficiency     Deficiency of other specified B group vitamins   • Wears dentures        No Known Allergies    Past Surgical History:   Procedure Laterality Date   • CATARACT EXTRACTION, BILATERAL     • CORONARY ARTERY BYPASS GRAFT  2005    x4   • EAR MASTOIDECTOMY W/ COCHLEAR IMPLANT W/ LANDMARK Right    • THYROID SURGERY     • TOTAL THYROIDECTOMY         Family History   Problem Relation Age of Onset   • Dementia Mother    • Heart failure Father    • Scoliosis Sister    • Dementia Brother    • Esophageal varices Son    • No Known Problems Brother    • Depression Son    • Suicidality Son    • Obesity Son        Social History     Socioeconomic History   • Marital status:      Spouse name: Not on file   • Number of children: Not on file   • Years of education: Not on file   • Highest education level: Not on file   Tobacco Use   • Smoking status: Former Smoker     Last attempt to quit:      Years since quittin.9   • Smokeless tobacco: Never Used   • Tobacco comment: uses vape   Substance and Sexual Activity   • Alcohol use: Yes     Alcohol/week: 4.0 - 6.0 standard drinks     Types: 4 - 6 Shots of liquor per week     Comment: [email or web address]   • Drug use: No   • Sexual activity: Defer           Objective   Physical Exam   Constitutional: He is oriented to person, place, and time. He appears well-developed and well-nourished. He appears cachectic. He appears ill.   HENT:   Head: Normocephalic and atraumatic.   Eyes: Pupils are equal, round, and reactive to light. Conjunctivae and EOM are normal.   Neck: Normal range of motion. Neck supple.   Cardiovascular: Normal rate, regular rhythm, normal heart sounds and intact distal  pulses.   Pulmonary/Chest: Effort normal. Tachypnea noted. He has decreased breath sounds in the right middle field and the right lower field. He has wheezes in the right lower field and the left lower field.   Abdominal: Soft. Bowel sounds are normal. He exhibits no shifting dullness, no pulsatile liver and no pulsatile midline mass. There is tenderness in the right upper quadrant. There is CVA tenderness. There is no rigidity and no guarding.   Musculoskeletal: Normal range of motion.   Neurological: He is alert and oriented to person, place, and time. He has normal reflexes.   Skin: Skin is warm and dry.   Psychiatric: He has a normal mood and affect.   Nursing note and vitals reviewed.      Procedures           ED Course  ED Course as of Dec 09 1822   Sun Dec 08, 2019   1804 Patient with history of mesothelioma progressive worsening shortness of breath and weakness and chest pain IV antibiotics given turned over to Dr. Hogan    [TS]   2016 Patient was endorsed to me at shift change. Please see primary providers full note for H and P.         Patient with worsening hypoxia at home, who has been relatively bed-bound for the last few months to weeks per the wife. He did fall once but she is unsure if she struck his head or had LOC. Patient is receiving therpay for leukemia as well as recent diagnosis of mesothelioma. Given hypoxia, weakness and large PnA will admit.     [JH]   2019 PORT is 120 CAT IV which is recommended for inpatient abx.     [JH]      ED Course User Index  [JH] Humberto Hogan MD  [TS] Aman Jackson MD                      No data recorded                        MDM    Final diagnoses:   Pneumonia of right lung due to infectious organism, unspecified part of lung              Aman Jackson MD  12/08/19 1805       Aman Jackson MD  12/09/19 1822

## 2019-12-09 LAB
ABO GROUP BLD: NORMAL
ANION GAP SERPL CALCULATED.3IONS-SCNC: 10 MMOL/L (ref 5–15)
BASOPHILS # BLD AUTO: 0.02 10*3/MM3 (ref 0–0.2)
BASOPHILS NFR BLD AUTO: 0.5 % (ref 0–1.5)
BLD GP AB SCN SERPL QL: NEGATIVE
BUN BLD-MCNC: 14 MG/DL (ref 8–23)
BUN/CREAT SERPL: 14 (ref 7–25)
CALCIUM SPEC-SCNC: 8.2 MG/DL (ref 8.6–10.5)
CHLORIDE SERPL-SCNC: 98 MMOL/L (ref 98–107)
CO2 SERPL-SCNC: 27 MMOL/L (ref 22–29)
CREAT BLD-MCNC: 1 MG/DL (ref 0.76–1.27)
DEPRECATED RDW RBC AUTO: 68.5 FL (ref 37–54)
EOSINOPHIL # BLD AUTO: 0.08 10*3/MM3 (ref 0–0.4)
EOSINOPHIL NFR BLD AUTO: 2 % (ref 0.3–6.2)
ERYTHROCYTE [DISTWIDTH] IN BLOOD BY AUTOMATED COUNT: 20.7 % (ref 12.3–15.4)
GFR SERPL CREATININE-BSD FRML MDRD: 72 ML/MIN/1.73
GLUCOSE BLD-MCNC: 97 MG/DL (ref 65–99)
HCT VFR BLD AUTO: 23.8 % (ref 37.5–51)
HGB BLD-MCNC: 7.6 G/DL (ref 13–17.7)
LYMPHOCYTES # BLD AUTO: 0.58 10*3/MM3 (ref 0.7–3.1)
LYMPHOCYTES NFR BLD AUTO: 14.2 % (ref 19.6–45.3)
MCH RBC QN AUTO: 30.6 PG (ref 26.6–33)
MCHC RBC AUTO-ENTMCNC: 31.9 G/DL (ref 31.5–35.7)
MCV RBC AUTO: 96 FL (ref 79–97)
MONOCYTES # BLD AUTO: 0.37 10*3/MM3 (ref 0.1–0.9)
MONOCYTES NFR BLD AUTO: 9 % (ref 5–12)
NEUTROPHILS # BLD AUTO: 2.99 10*3/MM3 (ref 1.7–7)
NEUTROPHILS NFR BLD AUTO: 73.1 % (ref 42.7–76)
PLATELET # BLD AUTO: 71 10*3/MM3 (ref 140–450)
PMV BLD AUTO: 13.6 FL (ref 6–12)
POTASSIUM BLD-SCNC: 4.2 MMOL/L (ref 3.5–5.2)
RBC # BLD AUTO: 2.48 10*6/MM3 (ref 4.14–5.8)
RH BLD: POSITIVE
S PNEUM AG SPEC QL LA: NEGATIVE
SODIUM BLD-SCNC: 135 MMOL/L (ref 136–145)
T&S EXPIRATION DATE: NORMAL
WBC NRBC COR # BLD: 4.09 10*3/MM3 (ref 3.4–10.8)

## 2019-12-09 PROCEDURE — 94799 UNLISTED PULMONARY SVC/PX: CPT

## 2019-12-09 PROCEDURE — 85025 COMPLETE CBC W/AUTO DIFF WBC: CPT | Performed by: FAMILY MEDICINE

## 2019-12-09 PROCEDURE — 94760 N-INVAS EAR/PLS OXIMETRY 1: CPT

## 2019-12-09 PROCEDURE — 86901 BLOOD TYPING SEROLOGIC RH(D): CPT | Performed by: INTERNAL MEDICINE

## 2019-12-09 PROCEDURE — 63710000001 DRONABINOL PER 2.5 MG: Performed by: FAMILY MEDICINE

## 2019-12-09 PROCEDURE — 86923 COMPATIBILITY TEST ELECTRIC: CPT

## 2019-12-09 PROCEDURE — 99223 1ST HOSP IP/OBS HIGH 75: CPT | Performed by: INTERNAL MEDICINE

## 2019-12-09 PROCEDURE — 25010000002 LEVOFLOXACIN PER 250 MG: Performed by: FAMILY MEDICINE

## 2019-12-09 PROCEDURE — 87899 AGENT NOS ASSAY W/OPTIC: CPT | Performed by: FAMILY MEDICINE

## 2019-12-09 PROCEDURE — 86900 BLOOD TYPING SEROLOGIC ABO: CPT | Performed by: INTERNAL MEDICINE

## 2019-12-09 PROCEDURE — 86850 RBC ANTIBODY SCREEN: CPT | Performed by: INTERNAL MEDICINE

## 2019-12-09 PROCEDURE — 25010000002 ENOXAPARIN PER 10 MG: Performed by: FAMILY MEDICINE

## 2019-12-09 PROCEDURE — 80048 BASIC METABOLIC PNL TOTAL CA: CPT | Performed by: FAMILY MEDICINE

## 2019-12-09 RX ORDER — ESCITALOPRAM OXALATE 10 MG/1
10 TABLET ORAL DAILY
Status: DISCONTINUED | OUTPATIENT
Start: 2019-12-09 | End: 2019-12-10 | Stop reason: HOSPADM

## 2019-12-09 RX ORDER — ASPIRIN 81 MG/1
81 TABLET ORAL DAILY
Status: DISCONTINUED | OUTPATIENT
Start: 2019-12-09 | End: 2019-12-10 | Stop reason: HOSPADM

## 2019-12-09 RX ADMIN — SODIUM CHLORIDE, PRESERVATIVE FREE 10 ML: 5 INJECTION INTRAVENOUS at 22:05

## 2019-12-09 RX ADMIN — ASPIRIN 81 MG: 81 TABLET ORAL at 14:13

## 2019-12-09 RX ADMIN — ESCITALOPRAM 10 MG: 10 TABLET, FILM COATED ORAL at 14:13

## 2019-12-09 RX ADMIN — ACETAMINOPHEN 650 MG: 325 TABLET, FILM COATED ORAL at 22:12

## 2019-12-09 RX ADMIN — SODIUM CHLORIDE 50 ML/HR: 9 INJECTION, SOLUTION INTRAVENOUS at 19:07

## 2019-12-09 RX ADMIN — ACETAMINOPHEN 650 MG: 325 TABLET, FILM COATED ORAL at 17:14

## 2019-12-09 RX ADMIN — ENOXAPARIN SODIUM 40 MG: 40 INJECTION SUBCUTANEOUS at 08:27

## 2019-12-09 RX ADMIN — DRONABINOL 2.5 MG: 2.5 CAPSULE ORAL at 22:08

## 2019-12-09 RX ADMIN — FAMOTIDINE 40 MG: 20 TABLET, FILM COATED ORAL at 08:27

## 2019-12-09 RX ADMIN — SODIUM CHLORIDE, PRESERVATIVE FREE 10 ML: 5 INJECTION INTRAVENOUS at 08:27

## 2019-12-09 RX ADMIN — DRONABINOL 2.5 MG: 2.5 CAPSULE ORAL at 08:27

## 2019-12-09 RX ADMIN — LEVOFLOXACIN 750 MG: 5 INJECTION, SOLUTION INTRAVENOUS at 22:02

## 2019-12-09 NOTE — ED PROVIDER NOTES
Subjective   History of Present Illness    Review of Systems    Past Medical History:   Diagnosis Date   • Arthritis    • Bilateral hearing loss 11/30/2018   • Bruises easily    • Bullous emphysema (CMS/[facility]) 11/30/2018   • Cervical spine fracture (CMS/[facility])     Fracture of neck, unspecified, sequela   • COPD (chronic obstructive pulmonary disease) (CMS/[facility])    • Coronary artery disease    • Depression     Major depressive disorder, single episode, unspecified   • Disease of thyroid gland    • Enlarged prostate     Enlarged prostate without lower urinary tract symptoms    • Gastritis     Gastritis, unspecified, without bleeding   • Hearing aid worn    • Hypercholesteremia    • Hypertension     Essential (primary) hypertension   • Hypertensive heart disease     Hypertensive heart disease without heart failure   • Intestinal malabsorption     unspecified   • Iron deficiency anemia     unspecified   • Leukemia (CMS/[facility])    • Macular degeneration (senile) of retina    • Orthostatic hypotension    • Osteoarthritis     Unspecified osteoarthritis, unspecified site   • Osteopenia     Other specified disorders of bone density and structure, unspecified site   • Postsurgical hypothyroidism     Postprocedural hypothyroidism   • 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 Malignant neoplasm of thyroid gland    • Unexplained weight loss     Abnormal weight loss   • Vitamin B12 deficiency     Deficiency of other specified B group vitamins   • Wears dentures        No Known Allergies    Past Surgical History:   Procedure Laterality Date   • CATARACT EXTRACTION, BILATERAL     • CORONARY ARTERY BYPASS GRAFT  2005    x4   • EAR MASTOIDECTOMY W/ COCHLEAR IMPLANT W/  LANDMARK Right    • THYROID SURGERY     • TOTAL THYROIDECTOMY         Family History   Problem Relation Age of Onset   • Dementia Mother    • Heart failure Father    • Scoliosis Sister    • Dementia Brother    • Esophageal varices Son    • No Known Problems Brother    • Depression Son    • Suicidality Son    • Obesity Son        Social History     Socioeconomic History   • Marital status:      Spouse name: Not on file   • Number of children: Not on file   • Years of education: Not on file   • Highest education level: Not on file   Tobacco Use   • Smoking status: Former Smoker     Last attempt to quit: 2015     Years since quittin.9   • Smokeless tobacco: Never Used   • Tobacco comment: uses vape   Substance and Sexual Activity   • Alcohol use: Yes     Alcohol/week: 4.0 - 6.0 standard drinks     Types: 4 - 6 Shots of liquor per week     Comment: [email or web address]   • Drug use: No   • Sexual activity: Defer           Objective   Physical Exam    Procedures           ED Course  ED Course as of Dec 08 2033   Sun Dec 08, 2019   1804 Patient with history of mesothelioma progressive worsening shortness of breath and weakness and chest pain IV antibiotics given turned over to Dr. Hogan    [TS]    Patient was endorsed to me at shift change. Please see primary providers full note for H and P.         Patient with worsening hypoxia at home, who has been relatively bed-bound for the last few months to weeks per the wife. He did fall once but she is unsure if she struck his head or had LOC. Patient is receiving therpay for leukemia as well as recent diagnosis of mesothelioma. Given hypoxia, weakness and large PnA will admit.     [JH]    PORT is 120 CAT IV which is recommended for inpatient abx.     [JH]      ED Course User Index  [JH] Humberto Hogan MD  [TS] Aman Jackson MD                      No data recorded                        MDM    Final diagnoses:   Pneumonia of right lung due to infectious  organism, unspecified part of lung              Humberto Hogan MD  12/08/19 2021       Humberto Hogan MD  12/08/19 2033

## 2019-12-09 NOTE — PROGRESS NOTES
Discharge Planning Assessment  [facility]     Patient Name: Miguel Asif  MRN: [id number]  Today's Date: 12/9/2019    Admit Date: 12/8/2019    Discharge Needs Assessment     Row Name 12/09/19 1439       Living Environment    Lives With  spouse    Name(s) of Who Lives With Patient  Spouse-Mahin    Current Living Arrangements  home/apartment/condo    Primary Care Provided by  self    Provides Primary Care For  no one    Family Caregiver if Needed  spouse    Quality of Family Relationships  supportive;involved;helpful    Able to Return to Prior Arrangements  yes       Resource/Environmental Concerns    Resource/Environmental Concerns  none    Transportation Concerns  car, none       Transition Planning    Patient/Family Anticipates Transition to  home with family    Patient/Family Anticipated Services at Transition  none    Transportation Anticipated  family or friend will provide       Discharge Needs Assessment    Readmission Within the Last 30 Days  no previous admission in last 30 days    Concerns to be Addressed  denies needs/concerns at this time    Equipment Currently Used at Home  cane, straight;walker, rolling;oxygen;bath bench    Anticipated Changes Related to Illness  none    Equipment Needed After Discharge  none        Discharge Plan     Row Name 12/09/19 7913       Plan    Plan  Home    Patient/Family in Agreement with Plan  yes    Plan Comments  Spoke with pt/wife in room to assess for home needs. Pt will return home to live with wife as before.  Pt has needed DME to include O2 at [facility] supplied by [employer].  They do not feel HH needed nor PDHD but did keep consent form in case they changed mind.  Pt has RX coverage/PCP, will have a ride home at discharge. Will follow.         Destination      Coordination has not been started for this encounter.      Durable Medical Equipment      Coordination has not been started for this encounter.      Dialysis/Infusion      Coordination has not been started for this  encounter.      Home Medical Care      Coordination has not been started for this encounter.      Therapy      Coordination has not been started for this encounter.      Community Resources      Coordination has not been started for this encounter.          Demographic Summary    No documentation.       Functional Status    No documentation.       Psychosocial    No documentation.       Abuse/Neglect    No documentation.       Legal    No documentation.       Substance Abuse    No documentation.       Patient Forms    No documentation.           UZAIR Rowley

## 2019-12-09 NOTE — CONSULTS
[facility] Oncology/Hematology Services  CONSULT NOTE    PATIENT NAME:  Miguel Asif  YOB: 1940  PATIENT MRN:  [id number]    Date of Admission:  12/8/2019  Consultation Date:  12/9/2019  Referring Provider: Ronald Pandey*      Subjective     Reason for Consultation: Continuity of care    History of present illness: Miguel Asif 79 y.o. 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  He has previously received IV Injectafer.  He is on MADELAINE support (Procrit).  He has also followed by Dr. Reba Warner, pulmonary medicine at [place] for a right upper lobe cavitary mass.    On 12/8/2019 patient was brought to the emergency room with progressive worsening of shortness of breath weakness and right-sided chest pain for the past 2 days.  Admission hemoglobin 8.9, hematocrit 27.8, MCV 95.2, platelets 89,000, WBC 4.47 with a normal differential (ANC 2.86).  CMP notable for sodium 134, chloride 96, albumin 2.9 (H depressed), glucose 113 alk phos 210 (each elevated) otherwise normal with a GFR of 82.    12/8/2019-CT angiogram of the chest- impression: No evidence of pulmonary embolus.  Increasing consolidative process in the right lower lobe and right middle lobe.  Highly concerning for atypical infection.  Extensive emphysema.    12/8/2019-CT abdomen and pelvis-impression: No acute abdominal pelvic abnormalities.  Cholelithiasis without evidence of acute cholecystitis.  Colonic diverticulosis without evidence of acute diverticulitis.    12/8/2019- CT head without IV contrast.  Impression: Limited evaluation for intracranial hemorrhage.  Presence of contrast.  Grossly, no acute intracranial abnormalities.    Patient was admitted and is now being managed for pneumonia.    They tell me that he had received packed red blood cell  transfusions last week and receives his Procrit on Fridays.    12/9/2019- hemoglobin 7.6, hematocrit 23.8, MCV 96, platelets 71,000, WBC 4.09 with a normal differential (ANC 2.99).    Patient is seen in care continuity.     DIAGNOSTIC ABNORMALITIES:    CBC on 12/11/2015 [facility] revealed a WBC of 7.2, hemoglobin 10, hematocrit 31.2, , and platelet count 119,000 with a normal differential.   CBC on 04/14/2016 revealed a WBC of 5.7, hemoglobin 8.5, hematocrit 26.8, .7, and platelet count 119,000 with a normal differential.   Occult blood x3 was negative on 04/28/2016.   Endoscopy on 05/24/2016 by Dr. Ahmet Segovia revealed gastritis, otherwise normal endoscopy. The patient considered for a colonoscopy secondary to weight loss.  Colonoscopy was negative 2011 per patient.  CBC on 06/09/2016 revealed a WBC of 5.8, hemoglobin 9, hematocrit 28.6, .4, and platelet count 142,000 with a normal differential.   Previous iron panel on 06/09/2016 revealed an iron saturation of 63, iron 163, TIBC 257, ferritin 122, and methylmalonic acid was normal at 284.  PATHOLOGY: [facility], comprehensive report, 08/16/2016. Findings consistent with refractory anemia with ringed sideroblasts; bone marrow, aspirate and biopsy: High-normocellular marrow (approximately 40-50% cellularity) with trilineage hematopoiesis showing a left shift in maturation and erythroid hyperplasia. Moderate dyserythropoiesis. Increased storage iron with frequent ringed sideroblasts (greater than 15%) Comprehensive comment:   The findings in the bone marrow could be compatible with a myelodysplastic syndrome, specifically refractory anemia with ringed sideroblasts. Other non-neoplastic causes of ringed sideroblasts must be excluded clinically including drug/toxin effect, zinc administration, copper deficiency, and alcohol. Clinical correlation and followup required. Cytogenetic studies reveal a normal male karyotype. FISH for  common abnormalities associated with myelodysplastic syndrome is negative. ALIREZA G4840K and SF3B1 K700E mutations were detected by targeted next generation sequencing. No other mutations were detected by targeted next generation sequencing interrogating hot-spot regions of 85 genes. In particular, no mutations were detected in genes including KIT, NPM1, FLT3, CEBPA, CALR, DNMT3A, JAK2, IDH1 or IDH2. ALIREZA mutations have been described in a variety of hematologic malignancies including lymphomas and leukemias. Targeted therapies are available in a clinical context for mutant ALIREZA (i.e, PARP inhibitors). SF3B1 encodes for a protein involved in RNA spliceosome machinery and mutations in this gene have been implicated in a number of hematologic neoplasms including CLL, MDS, MPN, overlap MDS/MPN and AML. In the setting of MDS, there was a significant association of SF3B1 mutations with the presence of ringed sideroblasts and of mutant allele burden with their proportion. In multivariate analysis including established risk factors, SF3B1 mutations were found to be independently associated with better overall survival and lower risk of evolution into AML. Cytogenomic array analysis revealed no significant gain, loss, or copy-neutral loss of heterozygosity on any chromosomal region. Cytogenetic Impression: Bone marrow, aspirate: Normal male karyotype 46,XY[20]. Flow Impression: Bone marrow, aspirate: Immunophenotypic abnormalities of myeloid blasts, maturing myeloid, and monocytic populations identified. No monoclonal B-cell population or immunophenotypically abnormal T-cell population identified.  No monoclonal plasma cell population identified. FISH Impression: Bone marrow, aspirate: Negative for the tested MDS-associated genetic abnormalities. Theranostic summary: ALIREZA I4199M and SF3B1 K700E mutations were detected by targeted next generation sequencing. A NORMAL cytogenomic array result was detected.      PREVIOUS  INTERVENTIONS:   Vitamin B12, 1000 mg IM 07/15/2016 through present at the [place] office/[facility].   Injectafer 750 mg 09/28/2016, 12/01/2016, and 06/22/2018 at the [facility].    Injecatfer 750 mg 07/12/2019 at [facility].  He had no response to oral iron in the past.   Procrit 01/04/2017 through present at the [facility].  2 units PRBCs 01/10/2018 at [facility].                Past Medical History:  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   • Cervical spine fracture (CMS/[facility])     Fracture of neck, unspecified, sequela   • COPD (chronic obstructive pulmonary disease) (CMS/HCC)    • Coronary artery disease    • Depression     Major depressive disorder, single episode, unspecified   • Disease of thyroid gland    • Enlarged prostate     Enlarged prostate without lower urinary tract symptoms    • Gastritis     Gastritis, unspecified, without bleeding   • Hearing aid worn    • Hypercholesteremia    • Hypertension     Essential (primary) hypertension   • Hypertensive heart disease     Hypertensive heart disease without heart failure   • Intestinal malabsorption     unspecified   • Iron deficiency anemia     unspecified   • Leukemia (CMS/HCC)    • Macular degeneration (senile) of retina    • Orthostatic hypotension    • Osteoarthritis     Unspecified osteoarthritis, unspecified site   • Osteopenia     Other specified disorders of bone density and structure, unspecified site   • Postsurgical hypothyroidism     Postprocedural hypothyroidism   • 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 Malignant neoplasm of thyroid gland    • Unexplained weight loss     Abnormal weight loss   • Vitamin B12 deficiency     Deficiency of other specified B group vitamins   • Wears dentures      Prior Surgeries:  Past Surgical History:   Procedure Laterality Date   • CATARACT EXTRACTION, BILATERAL     • CORONARY ARTERY BYPASS GRAFT  2005    x4   • EAR MASTOIDECTOMY W/ COCHLEAR IMPLANT W/ LANDMARK Right 2003   • THYROID SURGERY     • TOTAL THYROIDECTOMY  1974        Allergies:Patient has no known allergies.  PTA Meds:  Medications Prior to Admission   Medication Sig Dispense Refill Last Dose   • escitalopram (LEXAPRO) 10 MG tablet Take 10 mg by mouth Daily.      • aspirin 81 MG EC tablet Take 81 mg by mouth Daily.   Taking   • epoetin brady (PROCRIT) 12559 UNIT/ML injection Pt gets weekly at cancer center   Taking   • Fluticasone-Umeclidin-Vilant (TRELEGY ELLIPTA) 100-62.5-25 MCG/INH aerosol powder  Inhale 1 each Daily.   Taking   • levothyroxine (SYNTHROID, LEVOTHROID) 125 MCG tablet Take 125 mcg by mouth Daily.   Taking   • megestrol (MEGACE) 40 MG/ML suspension GIVE 5 MLS BY MOUTH DAILY  3 Taking   • VENTOLIN  (90 Base) MCG/ACT inhaler INHALE 1-2 PUFFS EVERY 4-6HRS AS NEEDED  11 Taking      Current Meds:   Current Facility-Administered Medications   Medication Dose Route Frequency Provider Last Rate Last Dose   • acetaminophen (TYLENOL) tablet 650 mg  650 mg Oral Q4H PRN Ronald Pandey MD   650 mg at 12/08/19 2252    Or   • acetaminophen (TYLENOL) 160 MG/5ML solution 650 mg  650 mg Oral Q4H PRN Ronald Pandey MD        Or   • acetaminophen (TYLENOL) suppository 650 mg  650 mg Rectal Q4H PRN Ronald Pandey MD       • aspirin EC tablet 81 mg  81 mg Oral Daily Iqra Ugalde APRN   81 mg at 12/09/19 1413   • dronabinol (MARINOL) capsule 2.5 mg  2.5 mg Oral BID Ronald Pandey MD   2.5 mg at 12/09/19 0827   • enoxaparin (LOVENOX) syringe 40 mg  40 mg  "Subcutaneous Q24H Ronald Pandey MD   40 mg at 12/09/19 0827   • escitalopram (LEXAPRO) tablet 10 mg  10 mg Oral Daily Iqra Ugalde APRN   10 mg at 12/09/19 1413   • famotidine (PEPCID) tablet 40 mg  40 mg Oral Daily Ronald Pandey MD   40 mg at 12/09/19 0827   • levoFLOXacin (LEVAQUIN) 750 mg/150 mL D5W (premix) (LEVAQUIN) 750 mg  750 mg Intravenous Q24H Ronald Pandey MD   750 mg at 12/08/19 2241   • sodium chloride 0.9 % flush 10 mL  10 mL Intravenous Q12H Ronald Pandey MD   10 mL at 12/09/19 0827   • sodium chloride 0.9 % flush 10 mL  10 mL Intravenous PRN Ronald Pandey MD       • sodium chloride 0.9 % infusion  50 mL/hr Intravenous Continuous Ronald Pandey MD 50 mL/hr at 12/08/19 2241 50 mL/hr at 12/08/19 2241       Family History:family history includes Dementia in his brother and mother; Depression in his son; Esophageal varices in his son; Heart failure in his father; No Known Problems in his brother; Obesity in his son; Scoliosis in his sister; Suicidality in his son.     Social History: reports that he quit smoking about 4 years ago. He has never used smokeless tobacco. He reports that he drinks about 4.0 - 6.0 standard drinks of alcohol per week. He reports that he does not use drugs.    Review of Systems  Review of Systems:   Constitutional: Very poor appetite.  Very weak.  Spouse states that he spends \"100% of the time\" in bed /No fever / No chills / No sweats  HEENT:  No sore throat / No hoarseness / No vision changes  CV: Worsening right sided chest pain / No palpitations / No orthopnea  Respiratory: Intermittent but mostly dry cough /progressively worsening shortness of breath \"never really cleared up since I was in the hospital in July\"/scant sputum / No hemoptysis  GI:  No nausea / No vomiting / No abdominal pain / No diarrhea /chronic constipation  :  No dysuria / No hesitancy / No urgency / No hematuria  Neuro: Generalized " "muscle weakness / No dysphagia / No headache / No paresthesias  Musculoskeletal: Leg edema / No cyanosis /chronic arthralgias      Objective      Vitals: /85 (BP Location: Left arm, Patient Position: Lying)   Pulse 73   Temp 98.3 °F (36.8 °C) (Oral)   Resp 16   Ht 170.2 cm (67\")   Wt 65.5 kg (144 lb 8 oz)   SpO2 93%   BMI 22.63 kg/m²     Physical Exam  Physical Exam:  General appearance: Chronically ill-appearing, nearly cachectic, modestly kept elderly male who appears fatigued but in no distress while lying in his hospital bed.  He is otherwise alert, appears stated age and cooperative.  His spouse is at the bedside.  Skin:  Skin color pale, texture, turgor dry. No rashes or lesions  HEENT:  Head: Bitemporal wasting, atraumatic.  Neck:  no adenopathy, no carotid bruit, no JVD, supple, symmetrical, trachea midline and thyroid not enlarged, symmetric, no tenderness/mass/nodules  Lungs: Poor excursion with bibasilar rales  Heart:  regular rate and rhythm, S1, S2 normal, no murmur, click, rub or gallop  Abdomen:  soft, non-tender; bowel sounds normal; no masses,  no organomegaly  Extremities:  extremities normal, atraumatic, no cyanosis with 2+ bipedal edema  Neurologic:  Mental status: Alert, oriented, thought content appropriate    Results from last 7 days   Lab Units 12/09/19  0608 12/08/19  1715 12/06/19  1202   WBC 10*3/mm3 4.09 4.47 5.05   HEMOGLOBIN g/dL 7.6* 8.9* 8.9*   HEMATOCRIT % 23.8* 27.8* 27.9*   PLATELETS 10*3/mm3 71* 89* 86*        Results from last 7 days   Lab Units 12/09/19  0424 12/08/19  1715 12/06/19  1202   SODIUM mmol/L 135* 134* 133*   POTASSIUM mmol/L 4.2 4.0 3.9   CHLORIDE mmol/L 98 96* 94*   CO2 mmol/L 27.0 29.0 30.0*   BUN mg/dL 14 15 17   CREATININE mg/dL 1.00 0.89 0.94   CALCIUM mg/dL 8.2* 8.9 8.7   BILIRUBIN mg/dL  --  0.8 1.2   ALK PHOS U/L  --  210* 227*   ALT (SGPT) U/L  --  40 40   AST (SGOT) U/L  --  36 43*   GLUCOSE mg/dL 97 113* 112*       ABG:    pH, Arterial   Date " Value Ref Range Status   12/08/2019 7.433 7.350 - 7.450 pH units Final     pO2, Arterial   Date Value Ref Range Status   12/08/2019 56.8 (L) 83.0 - 108.0 mm Hg Final     Comment:     84 Value below reference range     pCO2, Arterial   Date Value Ref Range Status   12/08/2019 43.0 35.0 - 45.0 mm Hg Final       Culture Data:   No results found for: BLOODCX, URINECX, WOUNDCX, MRSACX, RESPCX, STOOLCX    No results found for: PATHINTERP    Radiology:   Imaging Results (Last 7 Days)     Procedure Component Value Units Date/Time    CT Head Without Contrast [[id number]] Collected:  12/08/19 1944     Updated:  12/08/19 1950    Narrative:       EXAM: CT OF THE HEAD WITHOUT IV CONTRAST 12/08/2019     COMPARISON: Head CT dated 02/21/2015      INDICATION: Male, 79 years-old. Trauma      PROCEDURE: Non contrast enhanced head CT was performed.      Radiation dose equals DLP 1322 mGy-cm.  Automated exposure control dose  reduction technique was implemented.     FINDINGS: This examination has limited sensitivity for evaluation of  intracranial hemorrhage as patient received intravenous contrast  earlier.  Ventricles and cerebrospinal fluid spaces are normal in size and  configuration for the patient's age. There is no evidence of mass-effect  or midline shift. The gray-white differentiation is preserved. Chronic  microvascular changes. There is no evidence of intracranial contusion,  hemorrhage, or skull fracture.  The visualized portions of the paranasal  sinuses and mastoid air cells are unremarkable.       Impression:       1.  Limited evaluation for intracranial hemorrhage. Presence of  contrast.     2.  Grossly, no acute intracranial abnormalities.  This report was finalized on 12/08/2019 19:47 by Dr. Keesha Alejandra MD.    CT Abdomen Pelvis With Contrast [[id number]] Collected:  12/08/19 1756     Updated:  12/08/19 1804    Narrative:       EXAM: CT Abdomen and Pelvis with contrast      12/8/2019 5:56 PM CST  INDICATION:  Neoplasm: abdomen, heme/lymphatic, recurrence,  suspected/known  COMPARISON: CT abdomen pelvis dated 07/25/2019.  TECHNIQUE:  Abdomen and pelvis were scanned utilizing a multidetector helical  scanner from the diaphragm to the ischial tuberosities after  administration of IV contrast. Coronal and sagittal reformations were  obtained. [Routine protocol is performed.]     Radiation dose equals  mGy-cm.  Automated exposure control dose  reduction technique was implemented.        FINDINGS:     LINES and TUBES: None.     LOWER THORAX: Please see report from CT chest dated same day.     HEPATOBILIARY:  2 discrete hepatic cysts redemonstrated, measuring 12 x  11 x 10 cm and 6.4 x 8 x 8.2 cm. Additional too small to characterized  hypodensities throughout the liver including at the tip (image 36,  series 5) were seen on prior examination.   No liver laceration.   No  biliary ductal dilatation.      GALLBLADDER:  Cholelithiasis, with innumerable stones. No wall  thickening.      SPLEEN:  No splenomegaly.    No splenic laceration.      PANCREAS:  No focal masses or ductal dilatation.      ADRENALS:  No adrenal nodules.      KIDNEYS/URETERS:  Kidneys enhance symmetrically.   No hydronephrosis.    Too-small -to characterize hypodensities..  No stones.      GI TRACT:  No abnormal distention, wall thickening, or evidence of bowel  obstruction.   No acute appendicitis. Colonic diverticulosis, without  evidence of acute diverticulitis. Large stool ball is present at the  rectosigmoid junction..      PELVIC ORGANS/BLADDER:  No acute finding..      LYMPH NODES:  No inguinal canal, pelvic wall, retroperitoneal or  mesenteric lymphadenopathy by size..      VESSELS:  Atherosclerotic disease. No aortic aneurysm. No evidence of  acute aortic injury.. The portal vein is patent.     PERITONEUM / RETROPERITONEUM:  No free air or fluid.      BONES:  No acute osseous pathology. Chronic L2 compression deformity..      SOFT TISSUES:   Unremarkable.        Impression:       1.  No acute abdominal pelvic abnormalities.  2.  Cholelithiasis, without evidence of acute cholecystitis.  3.  Colonic diverticulosis, without evidence of acute diverticulitis.  4.  Additional chronic findings as described above.  This report was finalized on 12/08/2019 18:01 by Dr. Keesha Alejandra MD.    CT Angiogram Chest [[id number]] Collected:  12/08/19 1748     Updated:  12/08/19 1759    Narrative:       CT ANGIOGRAM CHEST- 12/8/2019 5:28 PM CST      HISTORY: Chest pain, acute, PE suspected, intermed prob, negative  D-dimer      COMPARISON: CT chest dated 07/26/2019.      Radiation dose equals  mGy-cm.  Automated exposure control dose  reduction technique was implemented.        TECHNIQUE: Helical tomographic images of the chest were obtained after  the administration of intravenous contrast following angiogram protocol.  Additionally, 3D reformatted images were provided.        FINDINGS:    Pulmonary arteries: There is adequate enhancement of the pulmonary  arteries to evaluate for central and segmental pulmonary emboli. There  are no filling defects within the main, lobar, segmental or visualized  subsegmental pulmonary arteries. The pulmonary arteries are within  normal limits.      Aorta and great vessels: There is atherosclerosis in the aorta without  aneurysm or dissection. There is atherosclerosis in the great vessels.     Visualized neck base: The imaged portion of the base of the neck appears  unremarkable.      Lungs: Extensive fibrotic changes throughout the lungs redemonstrated.  Consolidative process in the right lower lobe appears more extensive on  today's examination, with more involvement of the superior segment. More  involvement of the right middle lobe as well. Severe emphysema. There  are bronchiectatic changes as well. Cavitated lesion in the right upper  lobe redemonstrated, grossly similar given difference in techniques.     Heart: The heart  is normal in size. There is no pericardial effusion.  Coronary artery disease.     Mediastinum and lymph nodes: No enlarged mediastinal, hilar, or axillary  lymph nodes are present.      Skeletal and soft tissues: The osseous structures of the thorax and  surrounding soft tissues demonstrate no acute process. Chronic wedging  at L2 redemonstrated. Chronic T10 wedging also redemonstrated.     Upper abdomen: Please see report from CT abdomen pelvis dated same day..          Impression:       1.  No evidence of pulmonary embolus.  2.  Increasing consolidative process in the right lower lobe and right  middle lobe. This is highly concerning for a typical infection.  3.  Extensive emphysema..        This report was finalized on 12/08/2019 17:56 by Dr. Keesha Alejandra MD.           Assessment/Plan      ASSESSMENT:  1.     Pneumonia   2.    COPD with exacerbation   3.    Anemia, macrocytic, from myelodysplasia (refractory anemia with ringed sideroblasts), iron deficiency (now with iron excess), and B12 deficiency.   4.      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   Followed at [place].  No tissue diagnosis.  6.    Possible confluent lymphadenopathy in the celiac axis, measuring 1.8 cm transversely and 3.5 cm longitudinally. Followed at [place].   7.    10 mm nodule in the right adrenal gland    8.    Coronary artery disease.  9.    Poor appetite and weight loss  10.  Failure to thrive, multifactorial to include elements from all the above.     PLAN:  1.     Re:   Apprised of current labs to include the anemia, and thrombocytopenia.  Most recent iron studies from 12/6/2019 showed a serum iron of 39, saturation 26%, TIBC 149, ferritin 3611.  2.     Re:  Heme status.  Hemoglobin 7.6 gm, ANC 4.2, and platelet 162.   3.    Procrit 40,000 units subcutaneously weekly on Fridays-if hemoglobin less than 12 and hematocrit less than 36% if  ferritin at least 100 ng/mL and % saturation at least 20% (myelodysplasia).  Observe for adverse reactions or intolerance.   4.    CBC with differential daily while in hospital  5.    Transfuse 2 units PRBCs if hemoglobin below 8, symptomatic.  Premed Tylenol 500 mg p.o. and Benadryl 25 mg IV.  Lasix 20 mg IVP after each unit of PRBC.    6.    Continue general medical management        I discussed the patients findings and my recommendations with patient and his spouse at the bedside    Jeff Delvalle MD  12/09/19  4:49 PM    Time: Face-to-Face time on this encounter as defined by the American Medical Association in the 2018 Current Procedural Terminology codebook is at least 60 minutes.      Thank you for allowing me to participate in the care of Mr. Miguel Asif

## 2019-12-09 NOTE — PAYOR COMM NOTE
"ADMIT INPT 12-8-19  UR PHONE    [phone]  Chapito Gaffney (79 y.o. Male)     Date of Birth Social Security Number Address Home Phone MRN    1940  [address] [facility] [address] [phone] [id number]    Bapt[employer] Marital Status          Rastafari        Admission Date Admission Type Admitting Provider Attending Provider Department, Room/Bed    12/8/19 Emergency Froylan Clarke MD Truong, Khai C, MD [facility] 4C, 489/1    Discharge Date Discharge Disposition Discharge Destination                       Attending Provider:  Froylan Clarke MD    Allergies:  No Known Allergies    Isolation:  None   Infection:  None   Code Status:  CPR    Ht:  170.2 cm (67\")   Wt:  65.5 kg (144 lb 8 oz)    Admission Cmt:  None   Principal Problem:  Pneumonia of right lung due to infectious organism [J18.9]                 Active Insurance as of 12/8/2019     Primary Coverage     Payor Plan Insurance Group Employer/Plan Group    HUMANA MEDICARE REPLACEMENT HUMANA MEDICARE REPLACEMENT [id number]     Payor Plan Address Payor Plan Phone Number Payor Plan Fax Number Effective Dates    [address] [address] [phone]  1/1/2018 - None Entered    [facility] [id number]       Subscriber Name Subscriber Birth Date Member ID       CHAPITO GAFFNEY 1940 [id number]                 Emergency Contacts      (Rel.) Home Phone Work Phone Mobile Phone    Faith Gaffney (Spouse) [phone] -- [phone]               History & Physical      Ronald Pandey MD at 12/08/19 2102              [facility]  HISTORY AND PHYSICAL    Date of Admission: 12/8/2019  Primary Care Physician: Bossman Martinez MD    Subjective     Chief Complaint: right chest pain    History of Present Illness  79 year old male with PMH of Emphysema, refractory anemia and MDS, B12/Iron deficiency, CAD, HTN, presbyacusia, poor appetite and weakness, Pneumonia in July 2019 " that present with pain over right chest with breathing and moving. He has had this pain for the last 1-2 days, not associated with cough or fever, did not have this symptoms last admission. CT chest shows worsening infiltrates over right base.     He has history of mesothelioma and a RUL cavitary lesion, both treated at [place]. Lung tissue was too weak for biopsy due to emphysema and bullas. He follows locally with Dr Martinez and receives Iron/B12/Procrit or PRBC occasionally.     Wife states that since his last hospital stay, his mobility has declined leading to mostly remaining in bed. And over the last few weeks, his appetite has been worsening and lately would eat at the most a sandwich. Has tried Megace and failed.       Review of Systems   Otherwise complete ROS reviewed and negative except as mentioned in the HPI.    Past Medical History:   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   • Cervical spine fracture (CMS/[facility])     Fracture of neck, unspecified, sequela   • COPD (chronic obstructive pulmonary disease) (CMS/[facility])    • Coronary artery disease    • Depression     Major depressive disorder, single episode, unspecified   • Disease of thyroid gland    • Enlarged prostate     Enlarged prostate without lower urinary tract symptoms    • Gastritis     Gastritis, unspecified, without bleeding   • Hearing aid worn    • Hypercholesteremia    • Hypertension     Essential (primary) hypertension   • Hypertensive heart disease     Hypertensive heart disease without heart failure   • Intestinal malabsorption     unspecified   • Iron deficiency anemia     unspecified   • Leukemia (CMS/HCC)    • Macular degeneration (senile) of retina    • Orthostatic hypotension    • Osteoarthritis     Unspecified osteoarthritis, unspecified site   • Osteopenia     Other specified disorders of bone density and structure,  unspecified site   • Postsurgical hypothyroidism     Postprocedural hypothyroidism   • 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 Malignant neoplasm of thyroid gland    • Unexplained weight loss     Abnormal weight loss   • Vitamin B12 deficiency     Deficiency of other specified B group vitamins   • Wears dentures      Past Surgical History:  Past Surgical History:   Procedure Laterality Date   • CATARACT EXTRACTION, BILATERAL     • CORONARY ARTERY BYPASS GRAFT  2005    x4   • EAR MASTOIDECTOMY W/ COCHLEAR IMPLANT W/ LANDMARK Right 2003   • THYROID SURGERY     • TOTAL THYROIDECTOMY  1974     Social History:  reports that he quit smoking about 4 years ago. He has never used smokeless tobacco. He reports that he drinks about 4.0 - 6.0 standard drinks of alcohol per week. He reports that he does not use drugs.    Family History: family history includes Dementia in his brother and mother; Depression in his son; Esophageal varices in his son; Heart failure in his father; No Known Problems in his brother; Obesity in his son; Scoliosis in his sister; Suicidality in his son.       Allergies:  No Known Allergies  Medications:  Prior to Admission medications    Medication Sig Start Date End Date Taking? Authorizing Provider   acetaminophen-codeine (TYLENOL #3) 300-30 MG per tablet Take 1 tablet by mouth 3 (Three) Times a Day As Needed for Moderate Pain .    Provider, MD Tra   aspirin 81 MG EC tablet Take 81 mg by mouth Daily.    Provider, MD Tra   epoetin brady (PROCRIT) 62053 UNIT/ML injection  4/6/18   Emergency, Nurse Epic, RN   Fluticasone-Umeclidin-Vilant (TRELEGY ELLIPTA) 100-62.5-25 MCG/INH aerosol powder  Inhale 1 each Daily.     "ProviderTra MD   levothyroxine (SYNTHROID, LEVOTHROID) 125 MCG tablet Take 125 mcg by mouth Daily.    Tra Chambers MD   megestrol (MEGACE) 40 MG/ML suspension GIVE 5 MLS BY MOUTH DAILY 9/10/19   Tra Chambers MD   VENTOLIN  (90 Base) MCG/ACT inhaler INHALE 1-2 PUFFS EVERY 4-6HRS AS NEEDED 9/17/19   Tra Chambers MD     Objective     Vital Signs: /74   Pulse 83   Temp 97.5 °F (36.4 °C)   Resp 20   Ht 170.2 cm (67\")   Wt 72.6 kg (160 lb)   SpO2 92%   BMI 25.06 kg/m²    Physical Exam   Constitutional: He is oriented to person, place, and time. No distress.   Chronically ill appearing, debilitated male with dry tongue and mucosa.   HENT:   Head: Normocephalic and atraumatic.   Right Ear: External ear normal.   Left Ear: External ear normal.   Eyes: Pupils are equal, round, and reactive to light. EOM are normal. Right eye exhibits no discharge. Left eye exhibits no discharge.   Neck: Normal range of motion. Neck supple. No JVD present. No thyromegaly present.   Cardiovascular: Normal rate, regular rhythm and normal heart sounds.   No murmur heard.  Pulmonary/Chest: Effort normal. No respiratory distress.   Breath sounds are distant, no rales noted, poor air entry to bases.   Abdominal: Soft. Bowel sounds are normal. He exhibits no distension and no mass. There is no tenderness. There is no guarding.   Musculoskeletal: Normal range of motion. He exhibits edema. He exhibits no tenderness or deformity.   Neurological: He is alert and oriented to person, place, and time. He displays normal reflexes. No cranial nerve deficit. He exhibits normal muscle tone. Coordination normal.   Skin: Skin is warm. Capillary refill takes less than 2 seconds. He is not diaphoretic. No erythema.     Results Reviewed:  Lab Results (last 24 hours)     Procedure Component Value Units Date/Time    Lactic Acid, Plasma [[id number]]  (Normal) Collected:  12/08/19 8349    Specimen:  Blood Updated:  " 12/08/19 1857     Lactate 0.9 mmol/L     Blood Culture - Blood, Arm, Left [[id number]] Collected:  12/08/19 1844    Specimen:  Blood from Arm, Left Updated:  12/08/19 1850    Blood Culture - Blood, Arm, Right [[id number]] Collected:  12/08/19 1839    Specimen:  Blood from Arm, Right Updated:  12/08/19 1844    Comprehensive Metabolic Panel [[id number]]  (Abnormal) Collected:  12/08/19 1715    Specimen:  Blood Updated:  12/08/19 1814     Glucose 113 mg/dL      BUN 15 mg/dL      Creatinine 0.89 mg/dL      Sodium 134 mmol/L      Potassium 4.0 mmol/L      Chloride 96 mmol/L      CO2 29.0 mmol/L      Calcium 8.9 mg/dL      Total Protein 7.2 g/dL      Albumin 2.90 g/dL      ALT (SGPT) 40 U/L      AST (SGOT) 36 U/L      Alkaline Phosphatase 210 U/L      Total Bilirubin 0.8 mg/dL      eGFR Non African Amer 82 mL/min/1.73      Globulin 4.3 gm/dL      A/G Ratio 0.7 g/dL      BUN/Creatinine Ratio 16.9     Anion Gap 9.0 mmol/L     Narrative:       GFR Normal >60  Chronic Kidney Disease <60  Kidney Failure <15      BNP [[id number]]  (Normal) Collected:  12/08/19 1715    Specimen:  Blood Updated:  12/08/19 1814     proBNP 870.6 pg/mL     Narrative:       Among patients with dyspnea, NT-proBNP is highly sensitive for the detection of acute congestive heart failure. In addition NT-proBNP of <300 pg/ml effectively rules out acute congestive heart failure with 99% negative predictive value.      Troponin [[id number]]  (Normal) Collected:  12/08/19 1715    Specimen:  Blood Updated:  12/08/19 1814     Troponin T <0.010 ng/mL     Narrative:       Troponin T Reference Range:  <= 0.03 ng/mL-   Negative for AMI  >0.03 ng/mL-     Abnormal for myocardial necrosis.  Clinicians would have to utilize clinical acumen, EKG, Troponin and serial changes to determine if it is an Acute Myocardial Infarction or myocardial injury due to an underlying chronic condition.     CBC & Differential [[id number]] Collected:  12/08/19 1715    Specimen:  Blood Updated:   12/08/19 1753    Narrative:       The following orders were created for panel order CBC & Differential.  Procedure                               Abnormality         Status                     ---------                               -----------         ------                     CBC Auto Differential[[id number]]        Abnormal            Final result                 Please view results for these tests on the individual orders.    CBC Auto Differential [[id number]]  (Abnormal) Collected:  12/08/19 1715    Specimen:  Blood Updated:  12/08/19 1753     WBC 4.47 10*3/mm3      RBC 2.92 10*6/mm3      Hemoglobin 8.9 g/dL      Hematocrit 27.8 %      MCV 95.2 fL      MCH 30.5 pg      MCHC 32.0 g/dL      RDW 21.1 %      RDW-SD 69.1 fl      MPV 13.4 fL      Platelets 89 10*3/mm3      Neutrophil % 64.1 %      Lymphocyte % 22.1 %      Monocyte % 11.4 %      Eosinophil % 1.3 %      Basophil % 0.4 %      Neutrophils, Absolute 2.86 10*3/mm3      Lymphocytes, Absolute 0.99 10*3/mm3      Monocytes, Absolute 0.51 10*3/mm3      Eosinophils, Absolute 0.06 10*3/mm3      Basophils, Absolute 0.02 10*3/mm3     Blood Gas, Arterial [[id number]]  (Abnormal) Collected:  12/08/19 1705    Specimen:  Arterial Blood Updated:  12/08/19 1712     Site Left Brachial     Jose's Test N/A     pH, Arterial 7.433 pH units      pCO2, Arterial 43.0 mm Hg      pO2, Arterial 56.8 mm Hg      Comment: 84 Value below reference range        HCO3, Arterial 28.7 mmol/L      Comment: 83 Value above reference range        Base Excess, Arterial 4.0 mmol/L      Comment: 83 Value above reference range        O2 Saturation, Arterial 90.0 %      Comment: 84 Value below reference range        Temperature 37.0 C      Barometric Pressure for Blood Gas 749 mmHg      Modality Room Air     FIO2 21 %      Ventilator Mode NA     Collected by [id number]     Comment: Meter: [id number]     :  [id number]           Imaging Results (Last 24 Hours)     Procedure Component Value Units  Date/Time    CT Head Without Contrast [[id number]] Collected:  12/08/19 1944     Updated:  12/08/19 1950    Narrative:       EXAM: CT OF THE HEAD WITHOUT IV CONTRAST 12/08/2019     COMPARISON: Head CT dated 02/21/2015      INDICATION: Male, 79 years-old. Trauma      PROCEDURE: Non contrast enhanced head CT was performed.      Radiation dose equals DLP 1322 mGy-cm.  Automated exposure control dose  reduction technique was implemented.     FINDINGS: This examination has limited sensitivity for evaluation of  intracranial hemorrhage as patient received intravenous contrast  earlier.  Ventricles and cerebrospinal fluid spaces are normal in size and  configuration for the patient's age. There is no evidence of mass-effect  or midline shift. The gray-white differentiation is preserved. Chronic  microvascular changes. There is no evidence of intracranial contusion,  hemorrhage, or skull fracture.  The visualized portions of the paranasal  sinuses and mastoid air cells are unremarkable.       Impression:       1.  Limited evaluation for intracranial hemorrhage. Presence of  contrast.     2.  Grossly, no acute intracranial abnormalities.  This report was finalized on 12/08/2019 19:47 by Dr. Keesha Alejandra MD.    CT Abdomen Pelvis With Contrast [[id number]] Collected:  12/08/19 1756     Updated:  12/08/19 1804    Narrative:       EXAM: CT Abdomen and Pelvis with contrast      12/8/2019 5:56 PM CST  INDICATION: Neoplasm: abdomen, heme/lymphatic, recurrence,  suspected/known  COMPARISON: CT abdomen pelvis dated 07/25/2019.  TECHNIQUE:  Abdomen and pelvis were scanned utilizing a multidetector helical  scanner from the diaphragm to the ischial tuberosities after  administration of IV contrast. Coronal and sagittal reformations were  obtained. [Routine protocol is performed.]     Radiation dose equals  mGy-cm.  Automated exposure control dose  reduction technique was implemented.        FINDINGS:     LINES and TUBES:  None.     LOWER THORAX: Please see report from CT chest dated same day.     HEPATOBILIARY:  2 discrete hepatic cysts redemonstrated, measuring 12 x  11 x 10 cm and 6.4 x 8 x 8.2 cm. Additional too small to characterized  hypodensities throughout the liver including at the tip (image 36,  series 5) were seen on prior examination.   No liver laceration.   No  biliary ductal dilatation.      GALLBLADDER:  Cholelithiasis, with innumerable stones. No wall  thickening.      SPLEEN:  No splenomegaly.    No splenic laceration.      PANCREAS:  No focal masses or ductal dilatation.      ADRENALS:  No adrenal nodules.      KIDNEYS/URETERS:  Kidneys enhance symmetrically.   No hydronephrosis.    Too-small -to characterize hypodensities..  No stones.      GI TRACT:  No abnormal distention, wall thickening, or evidence of bowel  obstruction.   No acute appendicitis. Colonic diverticulosis, without  evidence of acute diverticulitis. Large stool ball is present at the  rectosigmoid junction..      PELVIC ORGANS/BLADDER:  No acute finding..      LYMPH NODES:  No inguinal canal, pelvic wall, retroperitoneal or  mesenteric lymphadenopathy by size..      VESSELS:  Atherosclerotic disease. No aortic aneurysm. No evidence of  acute aortic injury.. The portal vein is patent.     PERITONEUM / RETROPERITONEUM:  No free air or fluid.      BONES:  No acute osseous pathology. Chronic L2 compression deformity..      SOFT TISSUES:  Unremarkable.        Impression:       1.  No acute abdominal pelvic abnormalities.  2.  Cholelithiasis, without evidence of acute cholecystitis.  3.  Colonic diverticulosis, without evidence of acute diverticulitis.  4.  Additional chronic findings as described above.  This report was finalized on 12/08/2019 18:01 by Dr. Keesha Alejandra MD.    CT Angiogram Chest [[id number]] Collected:  12/08/19 1748     Updated:  12/08/19 1759    Narrative:       CT ANGIOGRAM CHEST- 12/8/2019 5:28 PM CST      HISTORY: Chest pain,  acute, PE suspected, intermed prob, negative  D-dimer      COMPARISON: CT chest dated 07/26/2019.      Radiation dose equals  mGy-cm.  Automated exposure control dose  reduction technique was implemented.        TECHNIQUE: Helical tomographic images of the chest were obtained after  the administration of intravenous contrast following angiogram protocol.  Additionally, 3D reformatted images were provided.        FINDINGS:    Pulmonary arteries: There is adequate enhancement of the pulmonary  arteries to evaluate for central and segmental pulmonary emboli. There  are no filling defects within the main, lobar, segmental or visualized  subsegmental pulmonary arteries. The pulmonary arteries are within  normal limits.      Aorta and great vessels: There is atherosclerosis in the aorta without  aneurysm or dissection. There is atherosclerosis in the great vessels.     Visualized neck base: The imaged portion of the base of the neck appears  unremarkable.      Lungs: Extensive fibrotic changes throughout the lungs redemonstrated.  Consolidative process in the right lower lobe appears more extensive on  today's examination, with more involvement of the superior segment. More  involvement of the right middle lobe as well. Severe emphysema. There  are bronchiectatic changes as well. Cavitated lesion in the right upper  lobe redemonstrated, grossly similar given difference in techniques.     Heart: The heart is normal in size. There is no pericardial effusion.  Coronary artery disease.     Mediastinum and lymph nodes: No enlarged mediastinal, hilar, or axillary  lymph nodes are present.      Skeletal and soft tissues: The osseous structures of the thorax and  surrounding soft tissues demonstrate no acute process. Chronic wedging  at L2 redemonstrated. Chronic T10 wedging also redemonstrated.     Upper abdomen: Please see report from CT abdomen pelvis dated same day..          Impression:       1.  No evidence of  pulmonary embolus.  2.  Increasing consolidative process in the right lower lobe and right  middle lobe. This is highly concerning for a typical infection.  3.  Extensive emphysema..        This report was finalized on 12/08/2019 17:56 by Dr. Keesha Alejandra MD.        I have personally reviewed and interpreted the radiology studies and ECG obtained at time of admission.     Assessment / Plan     Assessment:   Active Hospital Problems    Diagnosis   • **Pneumonia of right lung due to infectious organism   • Bilateral hearing loss   • Bullous emphysema (CMS/HCC)   • Nocturnal hypoxemia   • Scarring of lung   • COPD (chronic obstructive pulmonary disease) (CMS/[facility])   • MDS (myelodysplastic syndrome) (CMS/[facility])     Plan:   Admit to medical floor, vitals every 4 hours, IVF NS 50 cc/hour continue as needed. Fall precautions due to debility. PT/OT evaluation. Regular diet and encourage intake.   Follow blood cultures, check urine strep pneumo. Recent fungal antigens work up by Dr Martinez noted.   Antibiotics > Levaquin.  Marinol for appetite stimulant trial.  Consider consulting Dr Martinez as a courtesy.  Check TSH/T4 and adjust synthroid as needed  Wears left hearing aid, has not replaced right cochlear implant due to cost  Review home medications      Code Status: Full per patient, he is aware of poor lung function status     I discussed the patient's findings and my recommendations with the patient and wife    Estimated length of stay over 2 midnights    Ronald Pandey MD   12/08/19   9:02 PM            Electronically signed by Ronald Pandey MD at 12/08/19 2141          Emergency Department Notes      Aman Jackson MD at 12/08/19 1653          Subjective   79-year-old with mesothelioma no chemotherapy complaining of chronic chest wall pain abdominal wall pain was getting worse his wife states that she is not happy with the fact that his oncologist are not informing her as to what the plan would  be      Abdominal Pain   Pain location:  Generalized  Pain quality: aching and bloating    Pain radiates to:  Does not radiate  Pain severity:  Moderate  Onset quality:  Gradual  Timing:  Constant  Progression:  Worsening  Chronicity:  New  Context: not alcohol use, not diet changes, not eating, not previous surgeries, not recent illness, not sick contacts, not suspicious food intake and not trauma    Relieved by:  Nothing  Worsened by:  Nothing  Ineffective treatments:  None tried  Associated symptoms: chest pain    Associated symptoms: no anorexia, no chills, no cough, no fatigue, no fever, no nausea, no shortness of breath and no vomiting    Risk factors: recent hospitalization    Risk factors: no aspirin use    Chest Pain   Pain location:  R chest  Pain quality: sharp and stabbing    Pain radiates to:  Does not radiate  Pain severity:  Moderate  Onset quality:  Gradual  Timing:  Constant  Progression:  Worsening  Chronicity:  New  Context: not breathing, not drug use, not lifting and not at rest    Relieved by:  Nothing  Worsened by:  Nothing  Ineffective treatments:  None tried  Associated symptoms: abdominal pain    Associated symptoms: no altered mental status, no anorexia, no anxiety, no claudication, no cough, no dizziness, no fatigue, no fever, no headache, no nausea, no near-syncope, no orthopnea, no palpitations, no PND, no shortness of breath, no syncope, no vomiting and no weakness    Risk factors: hypertension    Risk factors: no aortic disease, no birth control, no immobilization, not male and no prior DVT/PE        Review of Systems   Constitutional: Negative.  Negative for chills, fatigue and fever.   HENT: Negative.  Negative for congestion.    Respiratory: Negative.  Negative for cough, chest tightness, shortness of breath and stridor.    Cardiovascular: Positive for chest pain. Negative for palpitations, orthopnea, claudication, syncope, PND and near-syncope.   Gastrointestinal: Positive for  abdominal pain. Negative for abdominal distention, anorexia, nausea and vomiting.   Endocrine: Negative.    Genitourinary: Negative.  Negative for difficulty urinating and flank pain.   Musculoskeletal: Negative.    Skin: Negative.  Negative for color change.   Neurological: Negative.  Negative for dizziness, weakness and headaches.   All other systems reviewed and are negative.      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   • Cervical spine fracture (CMS/HCC)     Fracture of neck, unspecified, sequela   • COPD (chronic obstructive pulmonary disease) (CMS/HCC)    • Coronary artery disease    • Depression     Major depressive disorder, single episode, unspecified   • Disease of thyroid gland    • Enlarged prostate     Enlarged prostate without lower urinary tract symptoms    • Gastritis     Gastritis, unspecified, without bleeding   • Hearing aid worn    • Hypercholesteremia    • Hypertension     Essential (primary) hypertension   • Hypertensive heart disease     Hypertensive heart disease without heart failure   • Intestinal malabsorption     unspecified   • Iron deficiency anemia     unspecified   • Leukemia (CMS/HCC)    • Macular degeneration (senile) of retina    • Orthostatic hypotension    • Osteoarthritis     Unspecified osteoarthritis, unspecified site   • Osteopenia     Other specified disorders of bone density and structure, unspecified site   • Postsurgical hypothyroidism     Postprocedural hypothyroidism   • 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 Malignant neoplasm of thyroid gland    • Unexplained weight loss     Abnormal  weight loss   • Vitamin B12 deficiency     Deficiency of other specified B group vitamins   • Wears dentures        No Known Allergies    Past Surgical History:   Procedure Laterality Date   • CATARACT EXTRACTION, BILATERAL     • CORONARY ARTERY BYPASS GRAFT  2005    x4   • EAR MASTOIDECTOMY W/ COCHLEAR IMPLANT W/ LANDMARK Right 2003   • THYROID SURGERY     • TOTAL THYROIDECTOMY  1974       Family History   Problem Relation Age of Onset   • Dementia Mother    • Heart failure Father    • Scoliosis Sister    • Dementia Brother    • Esophageal varices Son    • No Known Problems Brother    • Depression Son    • Suicidality Son    • Obesity Son        Social History     Socioeconomic History   • Marital status:      Spouse name: Not on file   • Number of children: Not on file   • Years of education: Not on file   • Highest education level: Not on file   Tobacco Use   • Smoking status: Former Smoker     Last attempt to quit:      Years since quittin.9   • Smokeless tobacco: Never Used   • Tobacco comment: uses vape   Substance and Sexual Activity   • Alcohol use: Yes     Alcohol/week: 4.0 - 6.0 standard drinks     Types: 4 - 6 Shots of liquor per week     Comment: [email or web address]   • Drug use: No   • Sexual activity: Defer           Objective   Physical Exam   Constitutional: He is oriented to person, place, and time. He appears well-developed and well-nourished. He appears cachectic. He appears ill.   HENT:   Head: Normocephalic and atraumatic.   Eyes: Pupils are equal, round, and reactive to light. Conjunctivae and EOM are normal.   Neck: Normal range of motion. Neck supple.   Cardiovascular: Normal rate, regular rhythm, normal heart sounds and intact distal pulses.   Pulmonary/Chest: Effort normal. Tachypnea noted. He has decreased breath sounds in the right middle field and the right lower field. He has wheezes in the right lower field and the left lower field.   Abdominal: Soft. Bowel sounds are normal. He  exhibits no shifting dullness, no pulsatile liver and no pulsatile midline mass. There is tenderness in the right upper quadrant. There is CVA tenderness. There is no rigidity and no guarding.   Musculoskeletal: Normal range of motion.   Neurological: He is alert and oriented to person, place, and time. He has normal reflexes.   Skin: Skin is warm and dry.   Psychiatric: He has a normal mood and affect.   Nursing note and vitals reviewed.      Procedures          ED Course  ED Course as of Dec 08 1805   Sun Dec 08, 2019   1804 Patient with history of mesothelioma progressive worsening shortness of breath and weakness and chest pain IV antibiotics given turned over to Dr. 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   • Cervical spine fracture (CMS/HCC)     Fracture of neck, unspecified, sequela   • COPD (chronic obstructive pulmonary disease) (CMS/HCC)    • Coronary artery disease    • Depression     Major depressive disorder, single episode, unspecified   • Disease of thyroid gland    • Enlarged prostate     Enlarged prostate without lower urinary tract symptoms    • Gastritis     Gastritis, unspecified, without bleeding   • Hearing aid worn    • Hypercholesteremia    • Hypertension     Essential (primary) hypertension   • Hypertensive heart disease     Hypertensive heart disease without heart failure   • Intestinal malabsorption     unspecified   • Iron  deficiency anemia     unspecified   • Leukemia (CMS/HCC)    • Macular degeneration (senile) of retina    • Orthostatic hypotension    • Osteoarthritis     Unspecified osteoarthritis, unspecified site   • Osteopenia     Other specified disorders of bone density and structure, unspecified site   • Postsurgical hypothyroidism     Postprocedural hypothyroidism   • 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 Malignant neoplasm of thyroid gland    • Unexplained weight loss     Abnormal weight loss   • Vitamin B12 deficiency     Deficiency of other specified B group vitamins   • Wears dentures        No Known Allergies    Past Surgical History:   Procedure Laterality Date   • CATARACT EXTRACTION, BILATERAL     • CORONARY ARTERY BYPASS GRAFT  2005    x4   • EAR MASTOIDECTOMY W/ COCHLEAR IMPLANT W/ LANDMARK Right    • THYROID SURGERY     • TOTAL THYROIDECTOMY         Family History   Problem Relation Age of Onset   • Dementia Mother    • Heart failure Father    • Scoliosis Sister    • Dementia Brother    • Esophageal varices Son    • No Known Problems Brother    • Depression Son    • Suicidality Son    • Obesity Son        Social History     Socioeconomic History   • Marital status:      Spouse name: Not on file   • Number of children: Not on file   • Years of education: Not on file   • Highest education level: Not on file   Tobacco Use   • Smoking status: Former Smoker     Last attempt to quit: 2015     Years since quittin.9   • Smokeless tobacco: Never Used   • Tobacco comment: uses vape   Substance and Sexual Activity   • Alcohol use: Yes     Alcohol/week: 4.0 - 6.0 standard drinks     Types: 4 - 6 Shots of liquor per week     Comment:  [email or web address]   • Drug use: No   • Sexual activity: Defer           Objective   Physical Exam    Procedures          ED Course  ED Course as of Dec 08 2033   Sun Dec 08, 2019   1804 Patient with history of mesothelioma progressive worsening shortness of breath and weakness and chest pain IV antibiotics given turned over to Dr. Hogan    [TS]   2016 Patient was endorsed to me at shift change. Please see primary providers full note for H and P.         Patient with worsening hypoxia at home, who has been relatively bed-bound for the last few months to weeks per the wife. He did fall once but she is unsure if she struck his head or had LOC. Patient is receiving therpay for leukemia as well as recent diagnosis of mesothelioma. Given hypoxia, weakness and large PnA will admit.     [JH]   2019 PORT is 120 CAT IV which is recommended for inpatient abx.     [JH]      ED Course User Index  [JH] Humberto Hogan MD  [TS] Aman Jackson MD                      No data recorded                        MDM    Final diagnoses:   Pneumonia of right lung due to infectious organism, unspecified part of lung              Humberto Hogan MD  12/08/19 2021       Humberto Hogan MD  12/08/19 2033      Electronically signed by Humberto Hogan MD at 12/08/19 2033       Vital Signs (last 2 days)     Date/Time   Temp   Temp src   Pulse   Resp   BP   Patient Position   SpO2    12/09/19 0311   97.3 (36.3)   Axillary   103   20   119/83   Sitting   92    12/08/19 2312   98.1 (36.7)   Oral   74   16   114/64   Lying   92    12/08/19 2234   --   --   76   --   --   --   93    12/08/19 2231   --   --   --   --   --   --   (!) 87    12/08/19 2137   98.2 (36.8)   Oral   85   18   135/75   Lying   92    12/08/19 1845   --   --   83   20   153/74   --   92    12/08/19 1830   --   --   82   --   144/70   --   (!) 84    12/08/19 1815   --   --   82   --   136/69   --   --    12/08/19 1800   --   --   80   --   141/73   --   (!) 87     12/08/19 1752   --   --   80   --   140/69   --   --    12/08/19 1700   --   --   76   --   128/72   --   92    12/08/19 1645   --   --   74   19   123/70   --   93    12/08/19 1630   --   --   73   --   124/60   --   96    12/08/19 1541   97.5 (36.4)   --   78   14   132/65   --   96            Oxygen Therapy (last 3 days)     Date/Time   SpO2   Device (Oxygen Therapy)   Flow (L/min)   Oxygen Concentration (%)   ETCO2 (mmHg)    12/09/19 0311   92   --   --   --   --    12/08/19 2312   92   --   --   --   --    12/08/19 2234   93   nasal cannula   1   --   --    12/08/19 2231   (!) 87   nasal cannula   1   --   --    12/08/19 2137   92   room air   --   --   --    12/08/19 1845   92   --   --   --   --    12/08/19 1836   --   nasal cannula   2   --   --    12/08/19 1830   (!) 84   --   --   --   --    12/08/19 1800   (!) 87   --   --   --   --    12/08/19 1700   92   --   --   --   --    12/08/19 1645   93   --   --   --   --    12/08/19 1630   96   --   --   --   --    12/08/19 1541   96   --   --   --   --            Intake & Output (last 3 days)       12/06 0701 - 12/07 0700 12/07 0701 - 12/08 0700 12/08 0701 - 12/09 0700    P.O.   120    IV Piggyback   1100    Total Intake(mL/kg)   1220 (18.6)    Urine (mL/kg/hr)   225    Total Output   225    Net   +995                Lines, Drains & Airways    Active LDAs     Name:   Placement date:   Placement time:   Site:   Days:    Peripheral IV 12/08/19 1715 Right Antecubital   12/08/19 1715    Antecubital   less than 1         Inactive LDAs     None                Hospital Medications (all)       Dose Frequency Start End    acetaminophen (TYLENOL) 160 MG/5ML solution 650 mg 650 mg Every 4 Hours PRN 12/8/2019     Admin Instructions: Do not exceed 4 grams of acetaminophen in a 24 hr period.<BR><BR>If given for pain, use the following pain scale: <BR>Mild Pain = Pain Score of 1-3, CPOT 1-2<BR>Moderate Pain = Pain Score of 4-6, CPOT 3-4<BR>Severe Pain = Pain Score of  "7-10, CPOT 5-8    Route: Oral    Linked Group 1:  \"Or\" Linked Group Details        acetaminophen (TYLENOL) suppository 650 mg 650 mg Every 4 Hours PRN 12/8/2019     Admin Instructions: Do not exceed 4 grams of acetaminophen in a 24 hr period.<BR><BR>If given for pain, use the following pain scale: <BR>Mild Pain = Pain Score of 1-3, CPOT 1-2<BR>Moderate Pain = Pain Score of 4-6, CPOT 3-4<BR>Severe Pain = Pain Score of 7-10, CPOT 5-8    Route: Rectal    Linked Group 1:  \"Or\" Linked Group Details        acetaminophen (TYLENOL) tablet 650 mg 650 mg Every 4 Hours PRN 12/8/2019     Admin Instructions: Do not exceed 4 grams of acetaminophen in a 24 hr period.<BR><BR>If given for pain, use the following pain scale: <BR>Mild Pain = Pain Score of 1-3, CPOT 1-2<BR>Moderate Pain = Pain Score of 4-6, CPOT 3-4<BR>Severe Pain = Pain Score of 7-10, CPOT 5-8    Route: Oral    Linked Group 1:  \"Or\" Linked Group Details        AZITHROMYCIN 500 MG/250 ML 0.9% NS IVPB (vial-mate) (Completed) 500 mg Once 12/8/2019 12/8/2019    Route: Intravenous    dronabinol (MARINOL) capsule 2.5 mg 2.5 mg 2 Times Daily 12/8/2019 12/18/2019    Admin Instructions:     Route: Oral    enoxaparin (LOVENOX) syringe 40 mg 40 mg Every 24 Hours Scheduled 12/9/2019     Admin Instructions: Give subcutaneous in abdomen only. Do not massage site after injection.    Route: Subcutaneous    famotidine (PEPCID) tablet 40 mg 40 mg Daily 12/9/2019     Route: Oral    fentaNYL citrate (PF) (SUBLIMAZE) injection 50 mcg (Completed) 50 mcg Once 12/8/2019 12/8/2019    Admin Instructions: Use filter needle to withdraw dose from ampule.<BR>If given for pain, use the following pain scale:<BR>Mild Pain = Pain Score of 1-3, CPOT 1-2<BR>Moderate Pain = Pain Score of 4-6, CPOT 3-4<BR>Severe Pain = Pain Score of 7-10, CPOT 5-8    Route: Intravenous    iopamidol (ISOVUE-370) 76 % injection 100 mL (Completed) 100 mL Once in Imaging 12/8/2019 12/8/2019    Route: Intravenous    " levoFLOXacin (LEVAQUIN) 750 mg/150 mL D5W (premix) (LEVAQUIN) 750 mg 750 mg Every 24 Hours 12/8/2019 12/15/2019    Admin Instructions: Caution: Look alike/sound alike drug alert. Protect from light. Do NOT refrigerate.    Route: Intravenous    ondansetron (ZOFRAN) injection 4 mg (Completed) 4 mg Once 12/8/2019 12/8/2019    Route: Intravenous    piperacillin-tazobactam (ZOSYN) 4.5 g/100 mL 0.9% NS IVPB (mbp) (Completed) 4.5 g Once 12/8/2019 12/8/2019    Admin Instructions: Refrigerate. Break seal and mix to activate vial before use.    Route: Intravenous    sodium chloride 0.9 % bolus 1,000 mL (Completed) 1,000 mL Once 12/8/2019 12/8/2019    Route: Intravenous    sodium chloride 0.9 % flush 10 mL 10 mL Every 12 Hours Scheduled 12/8/2019     Route: Intravenous    sodium chloride 0.9 % flush 10 mL 10 mL As Needed 12/8/2019     Route: Intravenous    sodium chloride 0.9 % infusion 50 mL/hr Continuous 12/8/2019     Route: Intravenous          Lab Results (last 48 hours)     Procedure Component Value Units Date/Time    Manual Differential [[id number]] Collected:  12/09/19 0608    Specimen:  Blood Updated:  12/09/19 0633    CBC & Differential [[id number]] Collected:  12/09/19 0608    Specimen:  Blood Updated:  12/09/19 0626    Narrative:       The following orders were created for panel order CBC & Differential.  Procedure                               Abnormality         Status                     ---------                               -----------         ------                     CBC Auto Differential[[id number]]                            In process                   Please view results for these tests on the individual orders.    CBC Auto Differential [[id number]] Collected:  12/09/19 0608    Specimen:  Blood Updated:  12/09/19 0626    Basic Metabolic Panel [[id number]]  (Abnormal) Collected:  12/09/19 0424    Specimen:  Blood Updated:  12/09/19 0506     Glucose 97 mg/dL      BUN 14 mg/dL      Creatinine 1.00 mg/dL       Sodium 135 mmol/L      Potassium 4.2 mmol/L      Comment: Specimen hemolyzed.  Results may be affected.        Chloride 98 mmol/L      CO2 27.0 mmol/L      Calcium 8.2 mg/dL      eGFR Non African Amer 72 mL/min/1.73      BUN/Creatinine Ratio 14.0     Anion Gap 10.0 mmol/L     Narrative:       GFR Normal >60  Chronic Kidney Disease <60  Kidney Failure <15      S. Pneumo Ag Urine or CSF - Urine, Urine, Clean Catch [[id number]]  (Normal) Collected:  12/09/19 0346    Specimen:  Urine, Clean Catch Updated:  12/09/19 0444     Strep Pneumo Ag Negative    MRSA Screen Culture - Swab, Nares [[id number]] Collected:  12/08/19 2242    Specimen:  Swab from Nares Updated:  12/08/19 2310    TSH [[id number]]  (Abnormal) Collected:  12/08/19 1715    Specimen:  Blood Updated:  12/08/19 2155     TSH 0.194 uIU/mL     T4, Free [[id number]]  (Abnormal) Collected:  12/08/19 1715    Specimen:  Blood Updated:  12/08/19 2155     Free T4 1.74 ng/dL     Lactic Acid, Plasma [[id number]]  (Normal) Collected:  12/08/19 1839    Specimen:  Blood Updated:  12/08/19 1857     Lactate 0.9 mmol/L     Blood Culture - Blood, Arm, Left [[id number]] Collected:  12/08/19 1844    Specimen:  Blood from Arm, Left Updated:  12/08/19 1850    Blood Culture - Blood, Arm, Right [[id number]] Collected:  12/08/19 1839    Specimen:  Blood from Arm, Right Updated:  12/08/19 1844    Comprehensive Metabolic Panel [[id number]]  (Abnormal) Collected:  12/08/19 1715    Specimen:  Blood Updated:  12/08/19 1814     Glucose 113 mg/dL      BUN 15 mg/dL      Creatinine 0.89 mg/dL      Sodium 134 mmol/L      Potassium 4.0 mmol/L      Chloride 96 mmol/L      CO2 29.0 mmol/L      Calcium 8.9 mg/dL      Total Protein 7.2 g/dL      Albumin 2.90 g/dL      ALT (SGPT) 40 U/L      AST (SGOT) 36 U/L      Alkaline Phosphatase 210 U/L      Total Bilirubin 0.8 mg/dL      eGFR Non African Amer 82 mL/min/1.73      Globulin 4.3 gm/dL      A/G Ratio 0.7 g/dL      BUN/Creatinine Ratio 16.9     Anion Gap  9.0 mmol/L     Narrative:       GFR Normal >60  Chronic Kidney Disease <60  Kidney Failure <15      BNP [[id number]]  (Normal) Collected:  12/08/19 1715    Specimen:  Blood Updated:  12/08/19 1814     proBNP 870.6 pg/mL     Narrative:       Among patients with dyspnea, NT-proBNP is highly sensitive for the detection of acute congestive heart failure. In addition NT-proBNP of <300 pg/ml effectively rules out acute congestive heart failure with 99% negative predictive value.      Troponin [[id number]]  (Normal) Collected:  12/08/19 1715    Specimen:  Blood Updated:  12/08/19 1814     Troponin T <0.010 ng/mL     Narrative:       Troponin T Reference Range:  <= 0.03 ng/mL-   Negative for AMI  >0.03 ng/mL-     Abnormal for myocardial necrosis.  Clinicians would have to utilize clinical acumen, EKG, Troponin and serial changes to determine if it is an Acute Myocardial Infarction or myocardial injury due to an underlying chronic condition.     CBC & Differential [[id number]] Collected:  12/08/19 1715    Specimen:  Blood Updated:  12/08/19 1753    Narrative:       The following orders were created for panel order CBC & Differential.  Procedure                               Abnormality         Status                     ---------                               -----------         ------                     CBC Auto Differential[[id number]]        Abnormal            Final result                 Please view results for these tests on the individual orders.    CBC Auto Differential [[id number]]  (Abnormal) Collected:  12/08/19 1715    Specimen:  Blood Updated:  12/08/19 1753     WBC 4.47 10*3/mm3      RBC 2.92 10*6/mm3      Hemoglobin 8.9 g/dL      Hematocrit 27.8 %      MCV 95.2 fL      MCH 30.5 pg      MCHC 32.0 g/dL      RDW 21.1 %      RDW-SD 69.1 fl      MPV 13.4 fL      Platelets 89 10*3/mm3      Neutrophil % 64.1 %      Lymphocyte % 22.1 %      Monocyte % 11.4 %      Eosinophil % 1.3 %      Basophil % 0.4 %      Neutrophils,  Absolute 2.86 10*3/mm3      Lymphocytes, Absolute 0.99 10*3/mm3      Monocytes, Absolute 0.51 10*3/mm3      Eosinophils, Absolute 0.06 10*3/mm3      Basophils, Absolute 0.02 10*3/mm3     Blood Gas, Arterial [[id number]]  (Abnormal) Collected:  12/08/19 1705    Specimen:  Arterial Blood Updated:  12/08/19 1712     Site Left Brachial     Jose's Test N/A     pH, Arterial 7.433 pH units      pCO2, Arterial 43.0 mm Hg      pO2, Arterial 56.8 mm Hg      Comment: 84 Value below reference range        HCO3, Arterial 28.7 mmol/L      Comment: 83 Value above reference range        Base Excess, Arterial 4.0 mmol/L      Comment: 83 Value above reference range        O2 Saturation, Arterial 90.0 %      Comment: 84 Value below reference range        Temperature 37.0 C      Barometric Pressure for Blood Gas 749 mmHg      Modality Room Air     FIO2 21 %      Ventilator Mode NA     Collected by [id number]     Comment: Meter: [id number]     :  [id number]             Imaging Results (Last 48 Hours)     Procedure Component Value Units Date/Time    CT Head Without Contrast [[id number]] Collected:  12/08/19 1944     Updated:  12/08/19 1950    Narrative:       EXAM: CT OF THE HEAD WITHOUT IV CONTRAST 12/08/2019     COMPARISON: Head CT dated 02/21/2015      INDICATION: Male, 79 years-old. Trauma      PROCEDURE: Non contrast enhanced head CT was performed.      Radiation dose equals DLP 1322 mGy-cm.  Automated exposure control dose  reduction technique was implemented.     FINDINGS: This examination has limited sensitivity for evaluation of  intracranial hemorrhage as patient received intravenous contrast  earlier.  Ventricles and cerebrospinal fluid spaces are normal in size and  configuration for the patient's age. There is no evidence of mass-effect  or midline shift. The gray-white differentiation is preserved. Chronic  microvascular changes. There is no evidence of intracranial contusion,  hemorrhage, or skull fracture.  The  visualized portions of the paranasal  sinuses and mastoid air cells are unremarkable.       Impression:       1.  Limited evaluation for intracranial hemorrhage. Presence of  contrast.     2.  Grossly, no acute intracranial abnormalities.  This report was finalized on 12/08/2019 19:47 by Dr. Keesha Alejandra MD.    CT Abdomen Pelvis With Contrast [[id number]] Collected:  12/08/19 1756     Updated:  12/08/19 1804    Narrative:       EXAM: CT Abdomen and Pelvis with contrast      12/8/2019 5:56 PM CST  INDICATION: Neoplasm: abdomen, heme/lymphatic, recurrence,  suspected/known  COMPARISON: CT abdomen pelvis dated 07/25/2019.  TECHNIQUE:  Abdomen and pelvis were scanned utilizing a multidetector helical  scanner from the diaphragm to the ischial tuberosities after  administration of IV contrast. Coronal and sagittal reformations were  obtained. [Routine protocol is performed.]     Radiation dose equals  mGy-cm.  Automated exposure control dose  reduction technique was implemented.        FINDINGS:     LINES and TUBES: None.     LOWER THORAX: Please see report from CT chest dated same day.     HEPATOBILIARY:  2 discrete hepatic cysts redemonstrated, measuring 12 x  11 x 10 cm and 6.4 x 8 x 8.2 cm. Additional too small to characterized  hypodensities throughout the liver including at the tip (image 36,  series 5) were seen on prior examination.   No liver laceration.   No  biliary ductal dilatation.      GALLBLADDER:  Cholelithiasis, with innumerable stones. No wall  thickening.      SPLEEN:  No splenomegaly.    No splenic laceration.      PANCREAS:  No focal masses or ductal dilatation.      ADRENALS:  No adrenal nodules.      KIDNEYS/URETERS:  Kidneys enhance symmetrically.   No hydronephrosis.    Too-small -to characterize hypodensities..  No stones.      GI TRACT:  No abnormal distention, wall thickening, or evidence of bowel  obstruction.   No acute appendicitis. Colonic diverticulosis, without  evidence of  acute diverticulitis. Large stool ball is present at the  rectosigmoid junction..      PELVIC ORGANS/BLADDER:  No acute finding..      LYMPH NODES:  No inguinal canal, pelvic wall, retroperitoneal or  mesenteric lymphadenopathy by size..      VESSELS:  Atherosclerotic disease. No aortic aneurysm. No evidence of  acute aortic injury.. The portal vein is patent.     PERITONEUM / RETROPERITONEUM:  No free air or fluid.      BONES:  No acute osseous pathology. Chronic L2 compression deformity..      SOFT TISSUES:  Unremarkable.        Impression:       1.  No acute abdominal pelvic abnormalities.  2.  Cholelithiasis, without evidence of acute cholecystitis.  3.  Colonic diverticulosis, without evidence of acute diverticulitis.  4.  Additional chronic findings as described above.  This report was finalized on 12/08/2019 18:01 by Dr. Keesha Alejandra MD.    CT Angiogram Chest [[id number]] Collected:  12/08/19 1748     Updated:  12/08/19 1759    Narrative:       CT ANGIOGRAM CHEST- 12/8/2019 5:28 PM CST      HISTORY: Chest pain, acute, PE suspected, intermed prob, negative  D-dimer      COMPARISON: CT chest dated 07/26/2019.      Radiation dose equals  mGy-cm.  Automated exposure control dose  reduction technique was implemented.        TECHNIQUE: Helical tomographic images of the chest were obtained after  the administration of intravenous contrast following angiogram protocol.  Additionally, 3D reformatted images were provided.        FINDINGS:    Pulmonary arteries: There is adequate enhancement of the pulmonary  arteries to evaluate for central and segmental pulmonary emboli. There  are no filling defects within the main, lobar, segmental or visualized  subsegmental pulmonary arteries. The pulmonary arteries are within  normal limits.      Aorta and great vessels: There is atherosclerosis in the aorta without  aneurysm or dissection. There is atherosclerosis in the great vessels.     Visualized neck base: The  imaged portion of the base of the neck appears  unremarkable.      Lungs: Extensive fibrotic changes throughout the lungs redemonstrated.  Consolidative process in the right lower lobe appears more extensive on  today's examination, with more involvement of the superior segment. More  involvement of the right middle lobe as well. Severe emphysema. There  are bronchiectatic changes as well. Cavitated lesion in the right upper  lobe redemonstrated, grossly similar given difference in techniques.     Heart: The heart is normal in size. There is no pericardial effusion.  Coronary artery disease.     Mediastinum and lymph nodes: No enlarged mediastinal, hilar, or axillary  lymph nodes are present.      Skeletal and soft tissues: The osseous structures of the thorax and  surrounding soft tissues demonstrate no acute process. Chronic wedging  at L2 redemonstrated. Chronic T10 wedging also redemonstrated.     Upper abdomen: Please see report from CT abdomen pelvis dated same day..          Impression:       1.  No evidence of pulmonary embolus.  2.  Increasing consolidative process in the right lower lobe and right  middle lobe. This is highly concerning for a typical infection.  3.  Extensive emphysema..        This report was finalized on 12/08/2019 17:56 by Dr. Keesha Alejandra MD.          Orders (last 48 hrs)      Start     Ordered    12/09/19 0900  enoxaparin (LOVENOX) syringe 40 mg  Every 24 Hours Scheduled      12/08/19 2128 12/09/19 0900  famotidine (PEPCID) tablet 40 mg  Daily      12/08/19 2128 12/09/19 0634  Manual Differential  Once      12/09/19 0633    12/09/19 0600  Basic Metabolic Panel  Daily      12/08/19 2128 12/09/19 0600  CBC & Differential  Daily      12/08/19 2128 12/09/19 0600  CBC Auto Differential  PROCEDURE ONCE      12/09/19 0002    12/09/19 0449  Manual Differential  Once,   Status:  Canceled      12/09/19 0448    12/09/19 0000  Vital Signs  Every 4 Hours      12/08/19 2128     12/08/19 2215  sodium chloride 0.9 % flush 10 mL  Every 12 Hours Scheduled      12/08/19 2128 12/08/19 2215  dronabinol (MARINOL) capsule 2.5 mg  2 Times Daily      12/08/19 2128 12/08/19 2215  sodium chloride 0.9 % infusion  Continuous      12/08/19 2128 12/08/19 2200  Incentive Spirometry  Every 4 Hours While Awake      12/08/19 2126 12/08/19 2200  levoFLOXacin (LEVAQUIN) 750 mg/150 mL D5W (premix) (LEVAQUIN) 750 mg  Every 24 Hours      12/08/19 2126 12/08/19 2129  TSH  Once      12/08/19 2129 12/08/19 2129  T4, Free  Once      12/08/19 2129 12/08/19 2128  Ambulate Patient  Every Shift      12/08/19 2128 12/08/19 2128  Fall Precautions  Continuous      12/08/19 2128 12/08/19 2128  Diet Regular  Diet Effective Now      12/08/19 2128 12/08/19 2127  acetaminophen (TYLENOL) tablet 650 mg  Every 4 Hours PRN      12/08/19 2128 12/08/19 2127  acetaminophen (TYLENOL) 160 MG/5ML solution 650 mg  Every 4 Hours PRN      12/08/19 2128 12/08/19 2127  acetaminophen (TYLENOL) suppository 650 mg  Every 4 Hours PRN      12/08/19 2128 12/08/19 2127  Respiratory Culture - Sputum, Cough  Once,   Status:  Canceled      12/08/19 2126 12/08/19 2127  Code Status and Medical Interventions:  Continuous      12/08/19 2128 12/08/19 2127  Intake & Output  Every Shift      12/08/19 2128 12/08/19 2127  Weigh Patient  Once      12/08/19 2128 12/08/19 2127  Oxygen Therapy- Nasal Cannula; Titrate for SPO2: 90% - 95%  Continuous      12/08/19 2128 12/08/19 2127  Insert Peripheral IV  Once      12/08/19 2128 12/08/19 2127  Saline Lock & Maintain IV Access  Continuous      12/08/19 2128 12/08/19 2126  sodium chloride 0.9 % flush 10 mL  As Needed      12/08/19 2128 12/08/19 2126  S. Pneumo Ag Urine or CSF - Urine, Urine, Clean Catch  Once      12/08/19 2126 12/08/19 2126  MRSA Screen Culture - Swab, Nares  Once      12/08/19 2126 12/08/19 2125  Pulse Oximetry on Admit  Once       12/08/19 2126 12/08/19 2125  Tobacco Cessation Education  Once      12/08/19 2126 12/08/19 2125  Notify Provider if Patient Requires Greater Than 4LPM of Oxygen  Until Discontinued      12/08/19 2126 12/08/19 2125  Pneumonia Finding Seen on CXR  Once      12/08/19 2126 12/08/19 2125  Patient At Risk for Pseudomonal Infection  Once      12/08/19 2126 12/08/19 2033  Inpatient Admission  Once      12/08/19 2032 12/08/19 2033  Cardiac Monitoring  Continuous      12/08/19 2032 12/08/19 2023  AZITHROMYCIN 500 MG/250 ML 0.9% NS IVPB (vial-mate)  Once      12/08/19 2021 12/08/19 1858  CT Head Without Contrast  1 Time Imaging      12/08/19 1858    12/08/19 1806  piperacillin-tazobactam (ZOSYN) 4.5 g/100 mL 0.9% NS IVPB (mbp)  Once      12/08/19 1804    12/08/19 1805  Blood Culture - Blood,  Once      12/08/19 1804    12/08/19 1805  Blood Culture - Blood,  Once      12/08/19 1804    12/08/19 1805  Lactic Acid, Plasma  Once      12/08/19 1804    12/08/19 1805  Blood Gas, Arterial  Once,   Status:  Canceled      12/08/19 1804    12/08/19 1752  Manual Differential  Once,   Status:  Canceled      12/08/19 1751    12/08/19 1734  iopamidol (ISOVUE-370) 76 % injection 100 mL  Once in Imaging      12/08/19 1732    12/08/19 1713  Blood Gas, Arterial  Once      12/08/19 1705    12/08/19 1704  fentaNYL citrate (PF) (SUBLIMAZE) injection 50 mcg  Once      12/08/19 1702    12/08/19 1704  ondansetron (ZOFRAN) injection 4 mg  Once      12/08/19 1702    12/08/19 1704  sodium chloride 0.9 % bolus 1,000 mL  Once      12/08/19 1702    12/08/19 1700  CT Abdomen Pelvis With Contrast  1 Time Imaging     Comments:  IV CONTRAST ONLY      12/08/19 1702    12/08/19 1659  CBC & Differential  Once      12/08/19 1702    12/08/19 1659  Comprehensive Metabolic Panel  Once      12/08/19 1702    12/08/19 1659  Blood Gas, Arterial  Once      12/08/19 1702    12/08/19 1659  BNP  Once      12/08/19 1702    12/08/19 1659  Troponin  Once       12/08/19 1702    12/08/19 1659  CT Angiogram Chest  1 Time Imaging      12/08/19 1702    12/08/19 1659  CBC Auto Differential  PROCEDURE ONCE      12/08/19 1702    12/08/19 1615  ECG 12 Lead  Once      12/08/19 1614    Unscheduled  Blood Gas, Arterial  As Needed     Comments:  Respiratory Distress      12/08/19 2126    --  escitalopram (LEXAPRO) 10 MG tablet  Daily      12/08/19 2130    --  SCANNED - TELEMETRY        12/08/19 0000              Ventilator/Non-Invasive Ventilation Settings (From admission, onward)    None        Physician Progress Notes (last 72 hours) (Notes from 12/06/19 0639 through 12/09/19 0639)    No notes of this type exist for this encounter.         Consult Notes (last 48 hours) (Notes from 12/07/19 0639 through 12/09/19 0639)    No notes of this type exist for this encounter.

## 2019-12-09 NOTE — PLAN OF CARE
Problem: Patient Care Overview  Goal: Plan of Care Review  Outcome: Ongoing (interventions implemented as appropriate)  Flowsheets (Taken 12/9/2019 0331)  Progress: no change  Plan of Care Reviewed With: patient; spouse  Outcome Summary: pt c/o of pain. tylenol admin with some relief. pt not resting well. vss. cont to monitor

## 2019-12-09 NOTE — PROGRESS NOTES
[facility]  INPATIENT PROGRESS NOTE    Length of Stay: 1  Date of Admission: 12/8/2019  Primary Care Physician: Bossman Martinez MD    Subjective   Chief Complaint: Right-sided chest pain worse with breathing  HPI   Sitting up in bed.  Wife present in room.  Reports pain over right-sided chest worse with breathing and movement.  Pain worsened over the last 2 days.  Denies cough.  Reports chills at home but did not check his temperature.  CT scan shows worsening infiltrates over the right lung.  History of right upper lobe cavitary lesion treated at [facility].  He has been followed by Dr. Martinez and Dr. Burnett in the past.  Receives iron, B12 and Procrit per Dr. Martinez.  Decreased appetite.  Denies nausea or vomiting.  Complains of constipation.  Oxygen at 2.5 L.  Normally wears oxygen only at bedtime but has been wearing oxygen continuously recently.    Review of Systems   Constitutional: Positive for activity change, appetite change, chills and fatigue. Negative for fever.   HENT: Negative for congestion and trouble swallowing.    Eyes: Negative for photophobia and visual disturbance.   Respiratory: Positive for shortness of breath. Negative for wheezing.    Cardiovascular: Negative for chest pain, palpitations and leg swelling.   Gastrointestinal: Positive for constipation. Negative for diarrhea, nausea and vomiting.   Endocrine: Negative for cold intolerance, heat intolerance and polyuria.   Genitourinary: Negative for dysuria, frequency, hematuria and urgency.   Musculoskeletal: Positive for arthralgias and gait problem.   Skin: Negative for color change, pallor, rash and wound.   Allergic/Immunologic: Negative for immunocompromised state.   Neurological: Positive for weakness.   Hematological: Negative for adenopathy. Does not bruise/bleed easily.   Psychiatric/Behavioral: Negative for agitation, behavioral problems and confusion.      All pertinent negatives and  positives are as above. All other systems have been reviewed and are negative unless otherwise stated.     Objective    Temp:  [97.3 °F (36.3 °C)-98.3 °F (36.8 °C)] 98.3 °F (36.8 °C)  Heart Rate:  [] 73  Resp:  [14-20] 16  BP: (114-153)/(60-85) 120/85  Physical Exam   Constitutional: He is oriented to person, place, and time.   Sitting up in bed.  Oxygen 2.5 L.  Wife in room.  No acute distress.   HENT:   Head: Normocephalic.   Eyes: Pupils are equal, round, and reactive to light. EOM are normal.   Neck: Normal range of motion. Neck supple.   Cardiovascular: Normal rate, regular rhythm, normal heart sounds and intact distal pulses. Exam reveals no gallop and no friction rub.   No murmur heard.  Normal sinus rhythm 74-93 on telemetry   Pulmonary/Chest: He has no wheezes. He has no rales.   Oxygen 2.5 L.  Decreased breath sounds posterior.   Abdominal: Soft. Bowel sounds are normal. He exhibits no distension. There is no tenderness.   Genitourinary:   Genitourinary Comments: Voiding.   Musculoskeletal: Normal range of motion. He exhibits no edema or deformity.   Neurological: He is alert and oriented to person, place, and time.   Skin: Skin is warm and dry. No rash noted. No erythema.   Psychiatric: He has a normal mood and affect. His behavior is normal. Judgment and thought content normal.     Results Review:  I have reviewed the labs, radiology results, and diagnostic studies.    Laboratory Data:   Results from last 7 days   Lab Units 12/09/19  0608 12/08/19  1715 12/06/19  1202   WBC 10*3/mm3 4.09 4.47 5.05   HEMOGLOBIN g/dL 7.6* 8.9* 8.9*   HEMATOCRIT % 23.8* 27.8* 27.9*   PLATELETS 10*3/mm3 71* 89* 86*        Results from last 7 days   Lab Units 12/09/19  0424 12/08/19  1715 12/06/19  1202   SODIUM mmol/L 135* 134* 133*   POTASSIUM mmol/L 4.2 4.0 3.9   CHLORIDE mmol/L 98 96* 94*   CO2 mmol/L 27.0 29.0 30.0*   BUN mg/dL 14 15 17   CREATININE mg/dL 1.00 0.89 0.94   CALCIUM mg/dL 8.2* 8.9 8.7   BILIRUBIN  mg/dL  --  0.8 1.2   ALK PHOS U/L  --  210* 227*   ALT (SGPT) U/L  --  40 40   AST (SGOT) U/L  --  36 43*   GLUCOSE mg/dL 97 113* 112*     No results found for: BLOODCX, URINECX, WOUNDCX, MRSACX, RESPCX, STOOLCX  Imaging Results (All)     Procedure Component Value Units Date/Time    CT Head Without Contrast [[id number]] Collected:  12/08/19 1944     Updated:  12/08/19 1950    Narrative:       EXAM: CT OF THE HEAD WITHOUT IV CONTRAST 12/08/2019     COMPARISON: Head CT dated 02/21/2015      INDICATION: Male, 79 years-old. Trauma      PROCEDURE: Non contrast enhanced head CT was performed.      Radiation dose equals DLP 1322 mGy-cm.  Automated exposure control dose  reduction technique was implemented.     FINDINGS: This examination has limited sensitivity for evaluation of  intracranial hemorrhage as patient received intravenous contrast  earlier.  Ventricles and cerebrospinal fluid spaces are normal in size and  configuration for the patient's age. There is no evidence of mass-effect  or midline shift. The gray-white differentiation is preserved. Chronic  microvascular changes. There is no evidence of intracranial contusion,  hemorrhage, or skull fracture.  The visualized portions of the paranasal  sinuses and mastoid air cells are unremarkable.       Impression:       1.  Limited evaluation for intracranial hemorrhage. Presence of  contrast.     2.  Grossly, no acute intracranial abnormalities.  This report was finalized on 12/08/2019 19:47 by Dr. Keesha Alejandra MD.    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  TECHNIQUE:  Abdomen and pelvis were scanned utilizing a multidetector helical  scanner from the diaphragm to the ischial tuberosities  after  administration of IV contrast. Coronal and sagittal reformations were  obtained. [Routine protocol is performed.]     Radiation dose equals  mGy-cm.  Automated exposure control dose  reduction technique was implemented.        FINDINGS:     LINES and TUBES: None.     LOWER THORAX: Please see report from CT chest dated same day.     HEPATOBILIARY:  2 discrete hepatic cysts redemonstrated, measuring 12 x  11 x 10 cm and 6.4 x 8 x 8.2 cm. Additional too small to characterized  hypodensities throughout the liver including at the tip (image 36,  series 5) were seen on prior examination.   No liver laceration.   No  biliary ductal dilatation.      GALLBLADDER:  Cholelithiasis, with innumerable stones. No wall  thickening.      SPLEEN:  No splenomegaly.    No splenic laceration.      PANCREAS:  No focal masses or ductal dilatation.      ADRENALS:  No adrenal nodules.      KIDNEYS/URETERS:  Kidneys enhance symmetrically.   No hydronephrosis.    Too-small -to characterize hypodensities..  No stones.      GI TRACT:  No abnormal distention, wall thickening, or evidence of bowel  obstruction.   No acute appendicitis. Colonic diverticulosis, without  evidence of acute diverticulitis. Large stool ball is present at the  rectosigmoid junction..      PELVIC ORGANS/BLADDER:  No acute finding..      LYMPH NODES:  No inguinal canal, pelvic wall, retroperitoneal or  mesenteric lymphadenopathy by size..      VESSELS:  Atherosclerotic disease. No aortic aneurysm. No evidence of  acute aortic injury.. The portal vein is patent.     PERITONEUM / RETROPERITONEUM:  No free air or fluid.      BONES:  No acute osseous pathology. Chronic L2 compression deformity..      SOFT TISSUES:  Unremarkable.        Impression:       1.  No acute abdominal pelvic abnormalities.  2.  Cholelithiasis, without evidence of acute cholecystitis.  3.  Colonic diverticulosis, without evidence of acute diverticulitis.  4.  Additional chronic findings  as described above.  This report was finalized on 12/08/2019 18:01 by Dr. Keesha Alejandra MD.    CT Angiogram Chest [[id number]] Collected:  12/08/19 1748     Updated:  12/08/19 1759    Narrative:       CT ANGIOGRAM CHEST- 12/8/2019 5:28 PM CST      HISTORY: Chest pain, acute, PE suspected, intermed prob, negative  D-dimer      COMPARISON: CT chest dated 07/26/2019.      Radiation dose equals  mGy-cm.  Automated exposure control dose  reduction technique was implemented.        TECHNIQUE: Helical tomographic images of the chest were obtained after  the administration of intravenous contrast following angiogram protocol.  Additionally, 3D reformatted images were provided.        FINDINGS:    Pulmonary arteries: There is adequate enhancement of the pulmonary  arteries to evaluate for central and segmental pulmonary emboli. There  are no filling defects within the main, lobar, segmental or visualized  subsegmental pulmonary arteries. The pulmonary arteries are within  normal limits.      Aorta and great vessels: There is atherosclerosis in the aorta without  aneurysm or dissection. There is atherosclerosis in the great vessels.     Visualized neck base: The imaged portion of the base of the neck appears  unremarkable.      Lungs: Extensive fibrotic changes throughout the lungs redemonstrated.  Consolidative process in the right lower lobe appears more extensive on  today's examination, with more involvement of the superior segment. More  involvement of the right middle lobe as well. Severe emphysema. There  are bronchiectatic changes as well. Cavitated lesion in the right upper  lobe redemonstrated, grossly similar given difference in techniques.     Heart: The heart is normal in size. There is no pericardial effusion.  Coronary artery disease.     Mediastinum and lymph nodes: No enlarged mediastinal, hilar, or axillary  lymph nodes are present.      Skeletal and soft tissues: The osseous structures of the thorax  and  surrounding soft tissues demonstrate no acute process. Chronic wedging  at L2 redemonstrated. Chronic T10 wedging also redemonstrated.     Upper abdomen: Please see report from CT abdomen pelvis dated same day..          Impression:       1.  No evidence of pulmonary embolus.  2.  Increasing consolidative process in the right lower lobe and right  middle lobe. This is highly concerning for a typical infection.  3.  Extensive emphysema..        This report was finalized on 12/08/2019 17:56 by Dr. Keesha Alejandra MD.        Intake/Output    Intake/Output Summary (Last 24 hours) at 12/9/2019 1248  Last data filed at 12/9/2019 0600  Gross per 24 hour   Intake 1340 ml   Output 350 ml   Net 990 ml       Scheduled Meds    aspirin 81 mg Oral Daily   dronabinol 2.5 mg Oral BID   enoxaparin 40 mg Subcutaneous Q24H   escitalopram 10 mg Oral Daily   famotidine 40 mg Oral Daily   levoFLOXacin 750 mg Intravenous Q24H   sodium chloride 10 mL Intravenous Q12H       I have reviewed the patient current medications.     Assessment/Plan     Active Hospital Problems    Diagnosis   • **Pneumonia of right lung due to infectious organism   • Bilateral hearing loss   • Bullous emphysema (CMS/HCC)   • Nocturnal hypoxemia   • Scarring of lung   • COPD (chronic obstructive pulmonary disease) (CMS/HCC)   • MDS (myelodysplastic syndrome) (CMS/[facility])     Plan:  1.  Received normal saline fluid bolus, azithromycin, Zosyn in ER.  2.  Levaquin IV  3.  Incentive spirometry. OPEP nebs as needed  4.  Strep pneumonia negative.  MRSA screen in progress.  Blood cultures in progress  5.  Lovenox for deep vein thrombosis prophylaxis  6.  Marinol added by Dr. Pandey.  Has taken Megace in the past but not recently.  7.  Her medications reviewed and resumed if appropriate  8.  Anemia.  Requires frequent transfusion.  Consult Dr. Martinez.  9.  Oxygen to keep saturation greater than 90 to 92%.  Wears oxygen at 2 to 2.5 L.  Has been wearing continuously at  home.  10.  Physical therapy consult, deconditioned.    His wife will act as his surrogate decision-maker  The above documentation resulted from a face-to-face encounter by me Iqra HURD, [facility].    Discharge Planning: I expect patient to be discharged to home in 2 days.    VANNESSA Horan   12/09/19   12:48 PM    I personally evaluated and examined the patient in conjunction with VANNESSA Jarvis and agree with the assessment, treatment plan, and disposition of the patient as recorded by her. My history, exam, and further recommendations are: I have reviewed and agree with the plans. Kt.

## 2019-12-09 NOTE — PLAN OF CARE
Problem: Patient Care Overview  Goal: Plan of Care Review  Outcome: Ongoing (interventions implemented as appropriate)  Flowsheets  Taken 12/9/2019 0331 by Crissy Downs RN  Progress: no change  Outcome Summary: pt c/o of pain. tylenol admin with some relief. pt not resting well. vss. cont to monitor  Taken 12/9/2019 0800 by Zeinab Negrete RN  Plan of Care Reviewed With: patient  Goal: Individualization and Mutuality  Outcome: Ongoing (interventions implemented as appropriate)  Flowsheets  Taken 12/9/2019 0334 by Crissy Downs RN  What Anxieties, Fears, Concerns, or Questions Do You Have About Your Care?: pt spouse states it is getting harder to take care of him at home as she has health issues of her own. 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 not sure if this is a definitive dx.  Taken 12/9/2019 0331 by Crissy Downs RN  Patient Specific Preferences: goes by Petros  Goal: Discharge Needs Assessment  Outcome: Ongoing (interventions implemented as appropriate)  Goal: Interprofessional Rounds/Family Conf  Outcome: Ongoing (interventions implemented as appropriate)     Problem: Fall Risk (Adult)  Goal: Identify Related Risk Factors and Signs and Symptoms  Outcome: Ongoing (interventions implemented as appropriate)  Flowsheets (Taken 12/9/2019 0331 by Crissy Downs RN)  Related Risk Factors (Fall Risk): age-related changes;fatigue/slow reaction;history of falls;environment unfamiliar  Signs and Symptoms (Fall Risk): presence of risk factors  Goal: Absence of Fall  Outcome: Ongoing (interventions implemented as appropriate)  Flowsheets (Taken 12/9/2019 0331 by Crissy Downs RN)  Absence of Fall: making progress toward outcome     Problem: Pneumonia (Adult)  Goal: Signs and Symptoms of Listed Potential Problems Will be Absent, Minimized or Managed (Pneumonia)  Outcome: Ongoing (interventions  implemented as appropriate)  Flowsheets (Taken 12/9/2019 0331 by Crissy Downs RN)  Problems Assessed (Pneumonia): all

## 2019-12-09 NOTE — H&P
[facility] Medicine Services  HISTORY AND PHYSICAL    Date of Admission: 12/8/2019  Primary Care Physician: Bossman Martinez MD    Subjective     Chief Complaint: right chest pain    History of Present Illness  79 year old male with PMH of Emphysema, refractory anemia and MDS, B12/Iron deficiency, CAD, HTN, presbyacusia, poor appetite and weakness, Pneumonia in July 2019 that present with pain over right chest with breathing and moving. He has had this pain for the last 1-2 days, not associated with cough or fever, did not have this symptoms last admission. CT chest shows worsening infiltrates over right base.     He has history of mesothelioma and a RUL cavitary lesion, both treated at [place]. Lung tissue was too weak for biopsy due to emphysema and bullas. He follows locally with Dr Martinez and receives Iron/B12/Procrit or PRBC occasionally.     Wife states that since his last hospital stay, his mobility has declined leading to mostly remaining in bed. And over the last few weeks, his appetite has been worsening and lately would eat at the most a sandwich. Has tried Megace and failed.       Review of Systems   Otherwise complete ROS reviewed and negative except as mentioned in the HPI.    Past Medical History:   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   • Cervical spine fracture (CMS/HCC)     Fracture of neck, unspecified, sequela   • COPD (chronic obstructive pulmonary disease) (CMS/HCC)    • Coronary artery disease    • Depression     Major depressive disorder, single episode, unspecified   • Disease of thyroid gland    • Enlarged prostate     Enlarged prostate without lower urinary tract symptoms    • Gastritis     Gastritis, unspecified, without bleeding   • Hearing aid worn    • Hypercholesteremia    • Hypertension     Essential (primary) hypertension   •  Hypertensive heart disease     Hypertensive heart disease without heart failure   • Intestinal malabsorption     unspecified   • Iron deficiency anemia     unspecified   • Leukemia (CMS/HCC)    • Macular degeneration (senile) of retina    • Orthostatic hypotension    • Osteoarthritis     Unspecified osteoarthritis, unspecified site   • Osteopenia     Other specified disorders of bone density and structure, unspecified site   • Postsurgical hypothyroidism     Postprocedural hypothyroidism   • 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 Malignant neoplasm of thyroid gland    • Unexplained weight loss     Abnormal weight loss   • Vitamin B12 deficiency     Deficiency of other specified B group vitamins   • Wears dentures      Past Surgical History:  Past Surgical History:   Procedure Laterality Date   • CATARACT EXTRACTION, BILATERAL     • CORONARY ARTERY BYPASS GRAFT  2005    x4   • EAR MASTOIDECTOMY W/ COCHLEAR IMPLANT W/ LANDMARK Right 2003   • THYROID SURGERY     • TOTAL THYROIDECTOMY  1974     Social History:  reports that he quit smoking about 4 years ago. He has never used smokeless tobacco. He reports that he drinks about 4.0 - 6.0 standard drinks of alcohol per week. He reports that he does not use drugs.    Family History: family history includes Dementia in his brother and mother; Depression in his son; Esophageal varices in his son; Heart failure in his father; No Known Problems in his brother; Obesity in his son; Scoliosis in his sister; Suicidality in his son.       Allergies:  No Known Allergies  Medications:  Prior to Admission medications    Medication Sig Start Date End Date Taking? Authorizing Provider   acetaminophen-codeine (TYLENOL  "#3) 300-30 MG per tablet Take 1 tablet by mouth 3 (Three) Times a Day As Needed for Moderate Pain .    Tra Chambers MD   aspirin 81 MG EC tablet Take 81 mg by mouth Daily.    Tra Chambers MD   epoetin brady (PROCRIT) 72577 UNIT/ML injection  4/6/18   Emergency, Nurse Epic, RN   Fluticasone-Umeclidin-Vilant (TRELEGY ELLIPTA) 100-62.5-25 MCG/INH aerosol powder  Inhale 1 each Daily.    Tra Chambers MD   levothyroxine (SYNTHROID, LEVOTHROID) 125 MCG tablet Take 125 mcg by mouth Daily.    Tra Chambers MD   megestrol (MEGACE) 40 MG/ML suspension GIVE 5 MLS BY MOUTH DAILY 9/10/19   Tra Chambers MD   VENTOLIN  (90 Base) MCG/ACT inhaler INHALE 1-2 PUFFS EVERY 4-6HRS AS NEEDED 9/17/19   Tra Chambers MD     Objective     Vital Signs: /74   Pulse 83   Temp 97.5 °F (36.4 °C)   Resp 20   Ht 170.2 cm (67\")   Wt 72.6 kg (160 lb)   SpO2 92%   BMI 25.06 kg/m²   Physical Exam   Constitutional: He is oriented to person, place, and time. No distress.   Chronically ill appearing, debilitated male with dry tongue and mucosa.   HENT:   Head: Normocephalic and atraumatic.   Right Ear: External ear normal.   Left Ear: External ear normal.   Eyes: Pupils are equal, round, and reactive to light. EOM are normal. Right eye exhibits no discharge. Left eye exhibits no discharge.   Neck: Normal range of motion. Neck supple. No JVD present. No thyromegaly present.   Cardiovascular: Normal rate, regular rhythm and normal heart sounds.   No murmur heard.  Pulmonary/Chest: Effort normal. No respiratory distress.   Breath sounds are distant, no rales noted, poor air entry to bases.   Abdominal: Soft. Bowel sounds are normal. He exhibits no distension and no mass. There is no tenderness. There is no guarding.   Musculoskeletal: Normal range of motion. He exhibits edema. He exhibits no tenderness or deformity.   Neurological: He is alert and oriented to person, place, and time. He " displays normal reflexes. No cranial nerve deficit. He exhibits normal muscle tone. Coordination normal.   Skin: Skin is warm. Capillary refill takes less than 2 seconds. He is not diaphoretic. No erythema.     Results Reviewed:  Lab Results (last 24 hours)     Procedure Component Value Units Date/Time    Lactic Acid, Plasma [[id number]]  (Normal) Collected:  12/08/19 1839    Specimen:  Blood Updated:  12/08/19 1857     Lactate 0.9 mmol/L     Blood Culture - Blood, Arm, Left [[id number]] Collected:  12/08/19 1844    Specimen:  Blood from Arm, Left Updated:  12/08/19 1850    Blood Culture - Blood, Arm, Right [[id number]] Collected:  12/08/19 1839    Specimen:  Blood from Arm, Right Updated:  12/08/19 1844    Comprehensive Metabolic Panel [[id number]]  (Abnormal) Collected:  12/08/19 1715    Specimen:  Blood Updated:  12/08/19 1814     Glucose 113 mg/dL      BUN 15 mg/dL      Creatinine 0.89 mg/dL      Sodium 134 mmol/L      Potassium 4.0 mmol/L      Chloride 96 mmol/L      CO2 29.0 mmol/L      Calcium 8.9 mg/dL      Total Protein 7.2 g/dL      Albumin 2.90 g/dL      ALT (SGPT) 40 U/L      AST (SGOT) 36 U/L      Alkaline Phosphatase 210 U/L      Total Bilirubin 0.8 mg/dL      eGFR Non African Amer 82 mL/min/1.73      Globulin 4.3 gm/dL      A/G Ratio 0.7 g/dL      BUN/Creatinine Ratio 16.9     Anion Gap 9.0 mmol/L     Narrative:       GFR Normal >60  Chronic Kidney Disease <60  Kidney Failure <15      BNP [[id number]]  (Normal) Collected:  12/08/19 1715    Specimen:  Blood Updated:  12/08/19 1814     proBNP 870.6 pg/mL     Narrative:       Among patients with dyspnea, NT-proBNP is highly sensitive for the detection of acute congestive heart failure. In addition NT-proBNP of <300 pg/ml effectively rules out acute congestive heart failure with 99% negative predictive value.      Troponin [[id number]]  (Normal) Collected:  12/08/19 1715    Specimen:  Blood Updated:  12/08/19 1814     Troponin T <0.010 ng/mL     Narrative:        Troponin T Reference Range:  <= 0.03 ng/mL-   Negative for AMI  >0.03 ng/mL-     Abnormal for myocardial necrosis.  Clinicians would have to utilize clinical acumen, EKG, Troponin and serial changes to determine if it is an Acute Myocardial Infarction or myocardial injury due to an underlying chronic condition.     CBC & Differential [[id number]] Collected:  12/08/19 1715    Specimen:  Blood Updated:  12/08/19 1753    Narrative:       The following orders were created for panel order CBC & Differential.  Procedure                               Abnormality         Status                     ---------                               -----------         ------                     CBC Auto Differential[[id number]]        Abnormal            Final result                 Please view results for these tests on the individual orders.    CBC Auto Differential [[id number]]  (Abnormal) Collected:  12/08/19 1715    Specimen:  Blood Updated:  12/08/19 1753     WBC 4.47 10*3/mm3      RBC 2.92 10*6/mm3      Hemoglobin 8.9 g/dL      Hematocrit 27.8 %      MCV 95.2 fL      MCH 30.5 pg      MCHC 32.0 g/dL      RDW 21.1 %      RDW-SD 69.1 fl      MPV 13.4 fL      Platelets 89 10*3/mm3      Neutrophil % 64.1 %      Lymphocyte % 22.1 %      Monocyte % 11.4 %      Eosinophil % 1.3 %      Basophil % 0.4 %      Neutrophils, Absolute 2.86 10*3/mm3      Lymphocytes, Absolute 0.99 10*3/mm3      Monocytes, Absolute 0.51 10*3/mm3      Eosinophils, Absolute 0.06 10*3/mm3      Basophils, Absolute 0.02 10*3/mm3     Blood Gas, Arterial [[id number]]  (Abnormal) Collected:  12/08/19 1705    Specimen:  Arterial Blood Updated:  12/08/19 1712     Site Left Brachial     Jose's Test N/A     pH, Arterial 7.433 pH units      pCO2, Arterial 43.0 mm Hg      pO2, Arterial 56.8 mm Hg      Comment: 84 Value below reference range        HCO3, Arterial 28.7 mmol/L      Comment: 83 Value above reference range        Base Excess, Arterial 4.0 mmol/L      Comment: 83  Value above reference range        O2 Saturation, Arterial 90.0 %      Comment: 84 Value below reference range        Temperature 37.0 C      Barometric Pressure for Blood Gas 749 mmHg      Modality Room Air     FIO2 21 %      Ventilator Mode NA     Collected by [id number]     Comment: Meter: [id number]     :  [id number]           Imaging Results (Last 24 Hours)     Procedure Component Value Units Date/Time    CT Head Without Contrast [[id number]] Collected:  12/08/19 1944     Updated:  12/08/19 1950    Narrative:       EXAM: CT OF THE HEAD WITHOUT IV CONTRAST 12/08/2019     COMPARISON: Head CT dated 02/21/2015      INDICATION: Male, 79 years-old. Trauma      PROCEDURE: Non contrast enhanced head CT was performed.      Radiation dose equals DLP 1322 mGy-cm.  Automated exposure control dose  reduction technique was implemented.     FINDINGS: This examination has limited sensitivity for evaluation of  intracranial hemorrhage as patient received intravenous contrast  earlier.  Ventricles and cerebrospinal fluid spaces are normal in size and  configuration for the patient's age. There is no evidence of mass-effect  or midline shift. The gray-white differentiation is preserved. Chronic  microvascular changes. There is no evidence of intracranial contusion,  hemorrhage, or skull fracture.  The visualized portions of the paranasal  sinuses and mastoid air cells are unremarkable.       Impression:       1.  Limited evaluation for intracranial hemorrhage. Presence of  contrast.     2.  Grossly, no acute intracranial abnormalities.  This report was finalized on 12/08/2019 19:47 by Dr. Keesha Alejandra MD.    CT Abdomen Pelvis With Contrast [[id number]] Collected:  12/08/19 1756     Updated:  12/08/19 1804    Narrative:       EXAM: CT Abdomen and Pelvis with contrast      12/8/2019 5:56 PM CST  INDICATION: Neoplasm: abdomen, heme/lymphatic, recurrence,  suspected/known  COMPARISON: CT abdomen pelvis dated  07/25/2019.  TECHNIQUE:  Abdomen and pelvis were scanned utilizing a multidetector helical  scanner from the diaphragm to the ischial tuberosities after  administration of IV contrast. Coronal and sagittal reformations were  obtained. [Routine protocol is performed.]     Radiation dose equals  mGy-cm.  Automated exposure control dose  reduction technique was implemented.        FINDINGS:     LINES and TUBES: None.     LOWER THORAX: Please see report from CT chest dated same day.     HEPATOBILIARY:  2 discrete hepatic cysts redemonstrated, measuring 12 x  11 x 10 cm and 6.4 x 8 x 8.2 cm. Additional too small to characterized  hypodensities throughout the liver including at the tip (image 36,  series 5) were seen on prior examination.   No liver laceration.   No  biliary ductal dilatation.      GALLBLADDER:  Cholelithiasis, with innumerable stones. No wall  thickening.      SPLEEN:  No splenomegaly.    No splenic laceration.      PANCREAS:  No focal masses or ductal dilatation.      ADRENALS:  No adrenal nodules.      KIDNEYS/URETERS:  Kidneys enhance symmetrically.   No hydronephrosis.    Too-small -to characterize hypodensities..  No stones.      GI TRACT:  No abnormal distention, wall thickening, or evidence of bowel  obstruction.   No acute appendicitis. Colonic diverticulosis, without  evidence of acute diverticulitis. Large stool ball is present at the  rectosigmoid junction..      PELVIC ORGANS/BLADDER:  No acute finding..      LYMPH NODES:  No inguinal canal, pelvic wall, retroperitoneal or  mesenteric lymphadenopathy by size..      VESSELS:  Atherosclerotic disease. No aortic aneurysm. No evidence of  acute aortic injury.. The portal vein is patent.     PERITONEUM / RETROPERITONEUM:  No free air or fluid.      BONES:  No acute osseous pathology. Chronic L2 compression deformity..      SOFT TISSUES:  Unremarkable.        Impression:       1.  No acute abdominal pelvic abnormalities.  2.  Cholelithiasis,  without evidence of acute cholecystitis.  3.  Colonic diverticulosis, without evidence of acute diverticulitis.  4.  Additional chronic findings as described above.  This report was finalized on 12/08/2019 18:01 by Dr. Keesha Alejandra MD.    CT Angiogram Chest [[id number]] Collected:  12/08/19 1748     Updated:  12/08/19 1759    Narrative:       CT ANGIOGRAM CHEST- 12/8/2019 5:28 PM CST      HISTORY: Chest pain, acute, PE suspected, intermed prob, negative  D-dimer      COMPARISON: CT chest dated 07/26/2019.      Radiation dose equals  mGy-cm.  Automated exposure control dose  reduction technique was implemented.        TECHNIQUE: Helical tomographic images of the chest were obtained after  the administration of intravenous contrast following angiogram protocol.  Additionally, 3D reformatted images were provided.        FINDINGS:    Pulmonary arteries: There is adequate enhancement of the pulmonary  arteries to evaluate for central and segmental pulmonary emboli. There  are no filling defects within the main, lobar, segmental or visualized  subsegmental pulmonary arteries. The pulmonary arteries are within  normal limits.      Aorta and great vessels: There is atherosclerosis in the aorta without  aneurysm or dissection. There is atherosclerosis in the great vessels.     Visualized neck base: The imaged portion of the base of the neck appears  unremarkable.      Lungs: Extensive fibrotic changes throughout the lungs redemonstrated.  Consolidative process in the right lower lobe appears more extensive on  today's examination, with more involvement of the superior segment. More  involvement of the right middle lobe as well. Severe emphysema. There  are bronchiectatic changes as well. Cavitated lesion in the right upper  lobe redemonstrated, grossly similar given difference in techniques.     Heart: The heart is normal in size. There is no pericardial effusion.  Coronary artery disease.     Mediastinum and lymph  nodes: No enlarged mediastinal, hilar, or axillary  lymph nodes are present.      Skeletal and soft tissues: The osseous structures of the thorax and  surrounding soft tissues demonstrate no acute process. Chronic wedging  at L2 redemonstrated. Chronic T10 wedging also redemonstrated.     Upper abdomen: Please see report from CT abdomen pelvis dated same day..          Impression:       1.  No evidence of pulmonary embolus.  2.  Increasing consolidative process in the right lower lobe and right  middle lobe. This is highly concerning for a typical infection.  3.  Extensive emphysema..        This report was finalized on 12/08/2019 17:56 by Dr. Keesha Alejandra MD.        I have personally reviewed and interpreted the radiology studies and ECG obtained at time of admission.     Assessment / Plan     Assessment:   Active Hospital Problems    Diagnosis   • **Pneumonia of right lung due to infectious organism   • Bilateral hearing loss   • Bullous emphysema (CMS/HCC)   • Nocturnal hypoxemia   • Scarring of lung   • COPD (chronic obstructive pulmonary disease) (CMS/HCC)   • MDS (myelodysplastic syndrome) (CMS/[facility])     Plan:   Admit to medical floor, vitals every 4 hours, IVF NS 50 cc/hour continue as needed. Fall precautions due to debility. PT/OT evaluation. Regular diet and encourage intake.   Follow blood cultures, check urine strep pneumo. Recent fungal antigens work up by Dr Martinez noted.   Antibiotics > Levaquin.  Marinol for appetite stimulant trial.  Consider consulting Dr Martinez as a courtesy.  Check TSH/T4 and adjust synthroid as needed  Wears left hearing aid, has not replaced right cochlear implant due to cost  Review home medications      Code Status: Full per patient, he is aware of poor lung function status     I discussed the patient's findings and my recommendations with the patient and wife    Estimated length of stay over 2 midnights    Ronald Pandey MD   12/08/19   9:02 PM

## 2019-12-10 VITALS
RESPIRATION RATE: 18 BRPM | SYSTOLIC BLOOD PRESSURE: 126 MMHG | OXYGEN SATURATION: 91 % | DIASTOLIC BLOOD PRESSURE: 98 MMHG | HEART RATE: 77 BPM | BODY MASS INDEX: 22.78 KG/M2 | HEIGHT: 67 IN | TEMPERATURE: 97.8 F | WEIGHT: 145.1 LBS

## 2019-12-10 PROBLEM — D69.6 THROMBOCYTOPENIA, ACQUIRED (HCC): Status: ACTIVE | Noted: 2019-12-10

## 2019-12-10 PROBLEM — J96.01 ACUTE RESPIRATORY FAILURE WITH HYPOXIA (HCC): Status: ACTIVE | Noted: 2019-12-10

## 2019-12-10 LAB
ANION GAP SERPL CALCULATED.3IONS-SCNC: 6 MMOL/L (ref 5–15)
BASOPHILS # BLD AUTO: 0.01 10*3/MM3 (ref 0–0.2)
BASOPHILS NFR BLD AUTO: 0.2 % (ref 0–1.5)
BUN BLD-MCNC: 12 MG/DL (ref 8–23)
BUN/CREAT SERPL: 13.5 (ref 7–25)
CALCIUM SPEC-SCNC: 8.5 MG/DL (ref 8.6–10.5)
CHLORIDE SERPL-SCNC: 101 MMOL/L (ref 98–107)
CO2 SERPL-SCNC: 29 MMOL/L (ref 22–29)
CREAT BLD-MCNC: 0.89 MG/DL (ref 0.76–1.27)
DEPRECATED RDW RBC AUTO: 69.1 FL (ref 37–54)
EOSINOPHIL # BLD AUTO: 0.05 10*3/MM3 (ref 0–0.4)
EOSINOPHIL NFR BLD AUTO: 1.2 % (ref 0.3–6.2)
ERYTHROCYTE [DISTWIDTH] IN BLOOD BY AUTOMATED COUNT: 21.2 % (ref 12.3–15.4)
GFR SERPL CREATININE-BSD FRML MDRD: 82 ML/MIN/1.73
GLUCOSE BLD-MCNC: 130 MG/DL (ref 65–99)
HCT VFR BLD AUTO: 27.3 % (ref 37.5–51)
HGB BLD-MCNC: 8.7 G/DL (ref 13–17.7)
LYMPHOCYTES # BLD AUTO: 0.76 10*3/MM3 (ref 0.7–3.1)
LYMPHOCYTES NFR BLD AUTO: 18.4 % (ref 19.6–45.3)
MCH RBC QN AUTO: 30.3 PG (ref 26.6–33)
MCHC RBC AUTO-ENTMCNC: 31.9 G/DL (ref 31.5–35.7)
MCV RBC AUTO: 95.1 FL (ref 79–97)
MONOCYTES # BLD AUTO: 0.37 10*3/MM3 (ref 0.1–0.9)
MONOCYTES NFR BLD AUTO: 8.9 % (ref 5–12)
MRSA SPEC QL CULT: NORMAL
NEUTROPHILS # BLD AUTO: 2.91 10*3/MM3 (ref 1.7–7)
NEUTROPHILS NFR BLD AUTO: 70.3 % (ref 42.7–76)
PLATELET # BLD AUTO: 76 10*3/MM3 (ref 140–450)
PMV BLD AUTO: 13.4 FL (ref 6–12)
POTASSIUM BLD-SCNC: 3.9 MMOL/L (ref 3.5–5.2)
RBC # BLD AUTO: 2.87 10*6/MM3 (ref 4.14–5.8)
SODIUM BLD-SCNC: 136 MMOL/L (ref 136–145)
WBC NRBC COR # BLD: 4.14 10*3/MM3 (ref 3.4–10.8)

## 2019-12-10 PROCEDURE — 25010000002 ENOXAPARIN PER 10 MG: Performed by: FAMILY MEDICINE

## 2019-12-10 PROCEDURE — 63710000001 DIPHENHYDRAMINE PER 50 MG: Performed by: INTERNAL MEDICINE

## 2019-12-10 PROCEDURE — 97161 PT EVAL LOW COMPLEX 20 MIN: CPT

## 2019-12-10 PROCEDURE — 80048 BASIC METABOLIC PNL TOTAL CA: CPT | Performed by: FAMILY MEDICINE

## 2019-12-10 PROCEDURE — 99232 SBSQ HOSP IP/OBS MODERATE 35: CPT | Performed by: INTERNAL MEDICINE

## 2019-12-10 PROCEDURE — P9016 RBC LEUKOCYTES REDUCED: HCPCS

## 2019-12-10 PROCEDURE — 85025 COMPLETE CBC W/AUTO DIFF WBC: CPT | Performed by: FAMILY MEDICINE

## 2019-12-10 PROCEDURE — 63710000001 DRONABINOL PER 2.5 MG: Performed by: FAMILY MEDICINE

## 2019-12-10 PROCEDURE — 36430 TRANSFUSION BLD/BLD COMPNT: CPT

## 2019-12-10 PROCEDURE — 86900 BLOOD TYPING SEROLOGIC ABO: CPT

## 2019-12-10 RX ORDER — MEGESTROL ACETATE 40 MG/ML
200 SUSPENSION ORAL DAILY
Qty: 150 ML | Refills: 0 | Status: SHIPPED | OUTPATIENT
Start: 2019-12-10

## 2019-12-10 RX ORDER — SIMVASTATIN 20 MG
20 TABLET ORAL NIGHTLY
COMMUNITY
End: 2020-01-10 | Stop reason: ALTCHOICE

## 2019-12-10 RX ORDER — LEVOFLOXACIN 750 MG/1
750 TABLET ORAL EVERY 24 HOURS
Status: DISCONTINUED | OUTPATIENT
Start: 2019-12-10 | End: 2019-12-10 | Stop reason: HOSPADM

## 2019-12-10 RX ORDER — DIPHENHYDRAMINE HCL 25 MG
25 CAPSULE ORAL ONCE
Status: COMPLETED | OUTPATIENT
Start: 2019-12-10 | End: 2019-12-10

## 2019-12-10 RX ORDER — FUROSEMIDE 10 MG/ML
20 INJECTION INTRAMUSCULAR; INTRAVENOUS AS NEEDED
Status: DISCONTINUED | OUTPATIENT
Start: 2019-12-10 | End: 2019-12-10 | Stop reason: HOSPADM

## 2019-12-10 RX ORDER — LEVOFLOXACIN 750 MG/1
750 TABLET ORAL EVERY 24 HOURS
Qty: 2 TABLET | Refills: 0 | Status: SHIPPED | OUTPATIENT
Start: 2019-12-10 | End: 2019-12-13

## 2019-12-10 RX ORDER — GUAIFENESIN 600 MG/1
1200 TABLET, EXTENDED RELEASE ORAL 2 TIMES DAILY
Qty: 20 TABLET | Refills: 0 | Status: SHIPPED | OUTPATIENT
Start: 2019-12-10

## 2019-12-10 RX ADMIN — ACETAMINOPHEN 650 MG: 325 TABLET, FILM COATED ORAL at 06:32

## 2019-12-10 RX ADMIN — ESCITALOPRAM 10 MG: 10 TABLET, FILM COATED ORAL at 09:54

## 2019-12-10 RX ADMIN — ENOXAPARIN SODIUM 40 MG: 40 INJECTION SUBCUTANEOUS at 09:54

## 2019-12-10 RX ADMIN — FAMOTIDINE 40 MG: 20 TABLET, FILM COATED ORAL at 09:54

## 2019-12-10 RX ADMIN — DIPHENHYDRAMINE HYDROCHLORIDE 25 MG: 25 CAPSULE ORAL at 09:54

## 2019-12-10 RX ADMIN — LEVOFLOXACIN 750 MG: 750 TABLET, FILM COATED ORAL at 15:36

## 2019-12-10 RX ADMIN — ASPIRIN 81 MG: 81 TABLET ORAL at 09:54

## 2019-12-10 RX ADMIN — DRONABINOL 2.5 MG: 2.5 CAPSULE ORAL at 09:54

## 2019-12-10 RX ADMIN — SODIUM CHLORIDE, PRESERVATIVE FREE 10 ML: 5 INJECTION INTRAVENOUS at 09:54

## 2019-12-10 NOTE — PLAN OF CARE
Problem: Patient Care Overview  Goal: Plan of Care Review  12/10/2019 0514 by Mehul Marie RN  Flowsheets (Taken 12/10/2019 0514)  Progress: no change  Plan of Care Reviewed With: patient  Outcome Summary: : pt c/o pain, prn tylenol given, pt tolerating well. Pt removed two IV's by accident. third IV in place and bedcheck and RN in room. Blood transfusion initiated. pt oriented but forgetful. o2 2L nc. vss. pt resting comfortably  12/10/2019 0508 by Mehul Marie, RN  Flowsheets  Taken 12/9/2019 0331 by Crissy Downs RN  Progress: no change  Outcome Summary: pt c/o of pain. tylenol admin with some relief. pt not resting well. vss. cont to monitor  Taken 12/9/2019 2050 by Mehul Marie RN  Plan of Care Reviewed With: patient  Goal: Individualization and Mutuality  12/10/2019 0514 by Mehul Marie RN  Flowsheets  Taken 12/9/2019 0334 by Crissy Downs RN  What Anxieties, Fears, Concerns, or Questions Do You Have About Your Care?: pt spouse states it is getting harder to take care of him at home as she has health issues of her own. she also stated that Dr Martinez received a letter from a provider in [place] that states some of pt issues could stem from Mesothelioma. not sure if this is a definitive dx.  Taken 12/9/2019 0331 by Crissy Downs RN  Patient Specific Preferences: goes by Petros  12/10/2019 0508 by Mehul Marie RN  Flowsheets  Taken 12/9/2019 0334 by Crissy Downs RN  What Anxieties, Fears, Concerns, or Questions Do You Have About Your Care?: pt spouse states it is getting harder to take care of him at home as she has health issues of her own. she also stated that Dr Martinez received a letter from a provider in [place] that states some of pt issues could stem from Mesothelioma. not sure if this is a definitive dx.  Taken 12/9/2019 0331 by Crissy Downs RN  Patient Specific Preferences: goes by Petros     Problem: Fall Risk (Adult)  Goal: Identify Related Risk Factors  and Signs and Symptoms  12/10/2019 0514 by Mehul Marie RN  Flowsheets (Taken 12/9/2019 0331 by Crissy Downs RN)  Related Risk Factors (Fall Risk): age-related changes;fatigue/slow reaction;history of falls;environment unfamiliar  Signs and Symptoms (Fall Risk): presence of risk factors  12/10/2019 0508 by Mehul Marie RN  Flowsheets (Taken 12/9/2019 0331 by Crissy Downs RN)  Related Risk Factors (Fall Risk): age-related changes;fatigue/slow reaction;history of falls;environment unfamiliar  Signs and Symptoms (Fall Risk): presence of risk factors  Goal: Absence of Fall  12/10/2019 0514 by Mehul Marie RN  Flowsheets (Taken 12/9/2019 0331 by Crissy Downs RN)  Absence of Fall: making progress toward outcome  12/10/2019 0508 by Mehul Marie RN  Flowsheets (Taken 12/9/2019 0331 by Crissy Downs RN)  Absence of Fall: making progress toward outcome     Problem: Pneumonia (Adult)  Goal: Signs and Symptoms of Listed Potential Problems Will be Absent, Minimized or Managed (Pneumonia)  12/10/2019 0514 by Mehul Marie RN  Flowsheets (Taken 12/9/2019 0331 by Crissy Downs RN)  Problems Assessed (Pneumonia): all  Problems Present (Pneumonia): respiratory compromise  12/10/2019 0508 by Mehul Marie RN  Flowsheets (Taken 12/9/2019 0331 by Crissy Downs RN)  Problems Assessed (Pneumonia): all  Problems Present (Pneumonia): respiratory compromise

## 2019-12-10 NOTE — THERAPY EVALUATION
Patient Name: Miguel Asif  : 1940    MRN: [id number]                              Today's Date: 12/10/2019       Admit Date: 2019    Visit Dx:     ICD-10-CM ICD-9-CM   1. Pneumonia of right lung due to infectious organism, unspecified part of lung J18.9 483.8   2. Impaired mobility Z74.09 799.89     Patient Active Problem List   Diagnosis   • MDS (myelodysplastic syndrome) (CMS/[facility])   • Former smoker   • Nocturnal hypoxemia   • Scarring of lung   • COPD (chronic obstructive pulmonary disease) (CMS/[facility])   • Bilateral hearing loss   • Bullous emphysema (CMS/[facility])   • Anemia, macrocytic   • Pneumonia of right lower lobe due to infectious organism (CMS/[facility])   • Acute blood loss anemia   • Shortness of breath   • Pneumonia of right lung due to infectious organism   • Acute respiratory failure with hypoxia (CMS/[facility])   • Thrombocytopenia, acquired (CMS/[facility])     Past Medical History:   Diagnosis Date   • Arthritis    • Bilateral hearing loss 2018   • Bruises easily    • Bullous emphysema (CMS/[facility]) 2018   • Cervical spine fracture (CMS/[facility])     Fracture of neck, unspecified, sequela   • COPD (chronic obstructive pulmonary disease) (CMS/[facility])    • Coronary artery disease    • Depression     Major depressive disorder, single episode, unspecified   • Disease of thyroid gland    • Enlarged prostate     Enlarged prostate without lower urinary tract symptoms    • Gastritis     Gastritis, unspecified, without bleeding   • Hearing aid worn    • Hypercholesteremia    • Hypertension     Essential (primary) hypertension   • Hypertensive heart disease     Hypertensive heart disease without heart failure   • Intestinal malabsorption     unspecified   • Iron deficiency anemia     unspecified   • Leukemia (CMS/[facility])    • Macular degeneration (senile) of retina    • Orthostatic hypotension    • Osteoarthritis     Unspecified osteoarthritis, unspecified site   • Osteopenia     Other specified disorders of bone density  and structure, unspecified site   • Postsurgical hypothyroidism     Postprocedural hypothyroidism   • 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 Malignant neoplasm of thyroid gland    • Unexplained weight loss     Abnormal weight loss   • Vitamin B12 deficiency     Deficiency of other specified B group vitamins   • Wears dentures      Past Surgical History:   Procedure Laterality Date   • CATARACT EXTRACTION, BILATERAL     • CORONARY ARTERY BYPASS GRAFT  2005    x4   • EAR MASTOIDECTOMY W/ COCHLEAR IMPLANT W/ LANDMARK Right 2003   • THYROID SURGERY     • TOTAL THYROIDECTOMY  1974     General Information     Row Name 12/10/19 1110          PT Evaluation Time/Intention    Document Type  evaluation Dx: R lung penumonia; mesothelioma; B hearing loss  -SB (r) MB (t) SB (c)     Mode of Treatment  physical therapy  -SB (r) MB (t) SB (c)     Row Name 12/10/19 1110          General Information    Patient Profile Reviewed?  yes  -SB (r) MB (t) SB (c)     Prior Level of Function  min assist:;ADL's;all household mobility;community mobility;independent:;feeding  -SB (r) MB (t) SB (c)     Existing Precautions/Restrictions  oxygen therapy device and L/min;fall  -SB (r) MB (t) SB (c)     Barriers to Rehab  hearing deficit;previous functional deficit  -SB (r) MB (t) SB (c)     Row Name 12/10/19 1110          Relationship/Environment    Lives With  spouse  -SB (r) MB (t) SB (c)     Row Name 12/10/19 1110          Resource/Environmental Concerns    Current Living Arrangements  home/apartment/condo front wheeled walker; SPC; walk in shower w/ chair; home O2 (uses 2.5L)  -SB (r) MB (t) SB (c)     Row Name 12/10/19 1110          Home Main Entrance    Number of Stairs,  Main Entrance  three  -SB (r) MB (t) SB (c)     Stair Railings, Main Entrance  none  -SB (r) MB (t) SB (c)     Row Name 12/10/19 1110          Stairs Within Home, Primary    Number of Stairs, Within Home, Primary  two  -SB (r) MB (t) SB (c)     Stair Railings, Within Home, Primary  railing on right side (ascending)  -SB (r) MB (t) SB (c)     Row Name 12/10/19 1110          Cognitive Assessment/Intervention- PT/OT    Orientation Status (Cognition)  oriented x 4  -SB (r) MB (t) SB (c)     Row Name 12/10/19 1110          Safety Issues, Functional Mobility    Safety Issues Affecting Function (Mobility)  impulsivity;positioning of assistive device  -SB (r) MB (t) SB (c)     Impairments Affecting Function (Mobility)  balance;endurance/activity tolerance;shortness of breath;strength;pain  -SB (r) MB (t) SB (c)       User Key  (r) = Recorded By, (t) = Taken By, (c) = Cosigned By    Initials Name Provider Type    SB Tameka Fritz, PT DPT Physical Therapist    Soni Hayes, PT Student PT Student        Mobility     Row Name 12/10/19 1110          Bed Mobility Assessment/Treatment    Bed Mobility Assessment/Treatment  supine-sit;sit-supine  -SB (r) MB (t) SB (c)     Supine-Sit [place] (Bed Mobility)  supervision  -SB (r) MB (t) SB (c)     Sit-Supine [place] (Bed Mobility)  minimum assist (75% patient effort)  -SB (r) MB (t) SB (c)     Assistive Device (Bed Mobility)  head of bed elevated  -SB (r) MB (t) SB (c)     Comment (Bed Mobility)  required min-A during sit to supine for BLE assist  -SB (r) MB (t) SB (c)     Row Name 12/10/19 1110          Sit-Stand Transfer    Sit-Stand [place] (Transfers)  contact guard  -SB (r) MB (t) SB (c)     Assistive Device (Sit-Stand Transfers)  walker, front-wheeled  -SB (r) MB (t) SB (c)     Row Name 12/10/19 1110          Gait/Stairs Assessment/Training    Gait/Stairs Assessment/Training  gait/ambulation assistive device  -SB (r) MB (t) SB (c)     [place] Level  (Gait)  contact guard  -SB (r) MB (t) SB (c)     Assistive Device (Gait)  walker, front-wheeled  -SB (r) MB (t) SB (c)     Distance in Feet (Gait)  80 ft  -SB (r) MB (t) SB (c)     Deviations/Abnormal Patterns (Gait)  base of support, narrow;gait speed decreased;stride length decreased  -SB (r) MB (t) SB (c)     Bilateral Gait Deviations  forward flexed posture  -SB (r) MB (t) SB (c)     Comment (Gait/Stairs)  Pt demonstrated impulsivity w/ turning w/ the RW requiring max VC for safety. Pt demonstrated one LOB d/t uncontrolled turning, however he was able to recover w/ CGA. Pt does not normally ambulate w/ RW. Recommend ambulation w/ RW upon d/c to improve balance and decrease risk of fall d/t LOB.  -SB (r) MB (t) SB (c)       User Key  (r) = Recorded By, (t) = Taken By, (c) = Cosigned By    Initials Name Provider Type    SB Tameka Fritz, PT DPT Physical Therapist    Soni Hayes, PT Student PT Student        Obj/Interventions     Row Name 12/10/19 1110          General ROM    GENERAL ROM COMMENTS  BLE AROM WFL  -SB (r) MB (t) SB (c)     Row Name 12/10/19 1110          MMT (Manual Muscle Testing)    General MMT Comments  BLE strength grossly 4/5  -SB (r) MB (t) SB (c)     Row Name 12/10/19 1110          Static Sitting Balance    Level of [place] (Unsupported Sitting, Static Balance)  supervision;contact guard assist  -SB (r) MB (t) SB (c)     Sitting Position (Unsupported Sitting, Static Balance)  sitting on edge of bed  -SB (r) MB (t) SB (c)     Comment (Unsupported Sitting, Static Balance)  Pt required CGA and VC d/t progressive L lateral lean in sitting  -SB (r) MB (t) SB (c)     Row Name 12/10/19 1110          Dynamic Sitting Balance    Level of [place], Reaches Outside Midline (Sitting, Dynamic Balance)  contact guard assist  -SB (r) MB (t) SB (c)     Sitting Position, Reaches Outside Midline (Sitting, Dynamic Balance)  sitting on edge of bed  -SB (r) MB (t) SB (c)     Comment, Reaches Outside  Midline (Sitting, Dynamic Balance)  Pt required CGA during BLE MMT d/t multiple LOB  -SB (r) MB (t) SB (c)     Row Name 12/10/19 1110          Static Standing Balance    Level of [place] (Supported Standing, Static Balance)  contact guard assist  -SB (r) MB (t) SB (c)     Assistive Device Utilized (Supported Standing, Static Balance)  walker, rolling  -SB (r) MB (t) SB (c)     Comment (Supported Standing, Static Balance)  Pt demo slight postural sway in standing  -SB (r) MB (t) SB (c)     Row Name 12/10/19 1110          Dynamic Standing Balance    Level of [place], Reaches Outside Midline (Standing, Dynamic Balance)  contact guard assist  -SB (r) MB (t) SB (c)     Assistive Device Utilized (Supported Standing, Dynamic Balance)  walker, rolling  -SB (r) MB (t) SB (c)     Comment, Reaches Outside Midline (Standing, Dynamic Balance)  Pt demonstrated LOB when trying to rotate trunk in standing requiring CGA for correction  -SB (r) MB (t) SB (c)     Row Name 12/10/19 1110          Sensory Assessment/Intervention    Sensory General Assessment  no sensation deficits identified  -SB (r) MB (t) SB (c)       User Key  (r) = Recorded By, (t) = Taken By, (c) = Cosigned By    Initials Name Provider Type    SB Tameka Fritz, PT DPT Physical Therapist    Soni Hayes, PT Student PT Student        Goals/Plan     Row Name 12/10/19 1110          Bed Mobility Goal 1 (PT)    Activity/Assistive Device (Bed Mobility Goal 1, PT)  sit to supine/supine to sit  -SB (r) MB (t) SB (c)     [place] Level/Cues Needed (Bed Mobility Goal 1, PT)  supervision required  -SB (r) MB (t) SB (c)     Time Frame (Bed Mobility Goal 1, PT)  long term goal (LTG)  -SB (r) MB (t) SB (c)     Progress/Outcomes (Bed Mobility Goal 1, PT)  goal ongoing  -SB (r) MB (t) SB (c)     Row Name 12/10/19 1110          Transfer Goal 1 (PT)    Activity/Assistive Device (Transfer Goal 1, PT)  sit-to-stand/stand-to-sit;walker, rolling  -SB (r) MB (t) SB (c)      [place] Level/Cues Needed (Transfer Goal 1, PT)  supervision required  -SB (r) MB (t) SB (c)     Time Frame (Transfer Goal 1, PT)  long term goal (LTG)  -SB (r) MB (t) SB (c)     Progress/Outcome (Transfer Goal 1, PT)  goal ongoing  -SB (r) MB (t) SB (c)     Row Name 12/10/19 1110          Gait Training Goal 1 (PT)    Activity/Assistive Device (Gait Training Goal 1, PT)  gait (walking locomotion);assistive device use;decrease fall risk;improve balance and speed;increase endurance/gait distance;walker, rolling  -SB (r) MB (t) SB (c)     [place] Level (Gait Training Goal 1, PT)  contact guard assist  -SB (r) MB (t) SB (c)     Distance (Gait Goal 1, PT)  150 ft  -SB (r) MB (t) SB (c)     Time Frame (Gait Training Goal 1, PT)  long term goal (LTG)  -SB (r) MB (t) SB (c)     Progress/Outcome (Gait Training Goal 1, PT)  goal ongoing  -SB (r) MB (t) SB (c)     Row Name 12/10/19 1110          Stairs Goal 1 (PT)    Activity/Assistive Device (Stairs Goal 1, PT)  ascending stairs;descending stairs w/ or w/o appropriate AD  -SB (r) MB (t) SB (c)     [place] Level/Cues Needed (Stairs Goal 1, PT)  contact guard assist  -SB (r) MB (t) SB (c)     Number of Stairs (Stairs Goal 1, PT)  3  -SB (r) MB (t) SB (c)     Time Frame (Stairs Goal 1, PT)  long term goal (LTG)  -SB (r) MB (t) SB (c)     Progress/Outcome (Stairs Goal 1, PT)  goal ongoing  -SB (r) MB (t) SB (c)     Row Name 12/10/19 1110          Patient Education Goal (PT)    Activity (Patient Education Goal, PT)  Pt demonstrates appropriate use of RW while turning during ambulation  -SB (r) MB (t) SB (c)     [place]/Cues/Accuracy (Memory Goal 2, PT)  demonstrates adequately;independent  -SB (r) MB (t) SB (c)     Time Frame (Patient Education Goal, PT)  long term goal (LTG)  -SB (r) MB (t) SB (c)     Progress/Outcome (Patient Education Goal, PT)  goal ongoing  -SB (r) MB (t) SB (c)       User Key  (r) = Recorded By, (t) = Taken By, (c) = Cosigned By     "Initials Name Provider Type    SB Tameka Fritz, PT DPT Physical Therapist    Soni Hayes, PT Student PT Student        Clinical Impression     Row Name 12/10/19 1110          Pain Assessment    Additional Documentation  Pain Scale: Numbers Pre/Post-Treatment (Group)  -SB (r) MB (t) SB (c)     Row Name 12/10/19 1110          Pain Scale: Numbers Pre/Post-Treatment    Pain Scale: Numbers, Pretreatment  0/10 - no pain  -SB (r) MB (t) SB (c)     Pain Scale: Numbers, Post-Treatment  -- no rank; pain seemed to increase based on facial expressions  -SB (r) MB (t) SB (c)     Pre/Post Treatment Pain Comment  fatigue  -SB (r) MB (t) SB (c)     Pain Intervention(s)  Medication (See MAR);Ambulation/increased activity;Repositioned  -SB (r) MB (t) SB (c)     Row Name 12/10/19 1110          Plan of Care Review    Plan of Care Reviewed With  patient  -SB (r) MB (t) SB (c)     Progress  no change  -SB (r) MB (t) SB (c)     Outcome Summary  PT evaluation completed. Pt A&O x4. Pt demonstrated decreased balance throughought evaluation. Pt required supervision for sup to sit. He required CGA for all sitting and standing balance d/t leaning, postural sway, and LOB. Pt required CGA and RW during ambulation.  Pt stated that he \"probably will need to use his walker when he gets home\". He was educated on importance of using the RW and on proper use. Pt demonstrated impulsivity when turning w/ RW and had a LOB from which he was able to recover w/ CGA. Feel pt would benefit from skilled PT in order to improve balance and continue practicing using the RW. Recommend d/c home w/ assist and HH.   -SB (r) MB (t) SB (c)     Row Name 12/10/19 1110          Physical Therapy Clinical Impression    Patient/Family Goals Statement (PT Clinical Impression)  go home  -SB (r) MB (t) SB (c)     Criteria for Skilled Interventions Met (PT Clinical Impression)  yes  -SB (r) MB (t) SB (c)     Rehab Potential (PT Clinical Summary)  good, to achieve stated " therapy goals  -SB (r) MB (t) SB (c)     Predicted Duration of Therapy (PT)  until d/c  -SB (r) MB (t) SB (c)     Row Name 12/10/19 1110          Vital Signs    O2 Delivery Pre Treatment  supplemental O2 2L  -SB (r) MB (t) SB (c)     O2 Delivery Intra Treatment  supplemental O2  -SB (r) MB (t) SB (c)     O2 Delivery Post Treatment  supplemental O2  -SB (r) MB (t) SB (c)     Pre Patient Position  Supine  -SB (r) MB (t) SB (c)     Intra Patient Position  Standing  -SB (r) MB (t) SB (c)     Post Patient Position  Supine  -SB (r) MB (t) SB (c)     Row Name 12/10/19 1110          Positioning and Restraints    Pre-Treatment Position  in bed  -SB (r) MB (t) SB (c)     Post Treatment Position  bed  -SB (r) MB (t) SB (c)     In Bed  notified nsg;fowlers;call light within reach;encouraged to call for assist;exit alarm on;side rails up x2;legs elevated  -SB (r) MB (t) SB (c)       User Key  (r) = Recorded By, (t) = Taken By, (c) = Cosigned By    Initials Name Provider Type    Tameka Darden, PT DPT Physical Therapist    Soni Hayes, PT Student PT Student        Outcome Measures     Row Name 12/10/19 1110          How much help from another person do you currently need...    Turning from your back to your side while in flat bed without using bedrails?  4  -SB (r) MB (t) SB (c)     Moving from lying on back to sitting on the side of a flat bed without bedrails?  3  -SB (r) MB (t) SB (c)     Moving to and from a bed to a chair (including a wheelchair)?  3  -SB (r) MB (t) SB (c)     Standing up from a chair using your arms (e.g., wheelchair, bedside chair)?  3  -SB (r) MB (t) SB (c)     Climbing 3-5 steps with a railing?  3  -SB (r) MB (t) SB (c)     To walk in hospital room?  3  -SB (r) MB (t) SB (c)     AM-PAC 6 Clicks Score (PT)  19  -SB (r) MB (t)     Row Name 12/10/19 1110          Functional Assessment    Outcome Measure Options  -PAC 6 Clicks Basic Mobility (PT)  -SB (r) MB (t) SB (c)       User Key  (r) =  "Recorded By, (t) = Taken By, (c) = Cosigned By    Initials Name Provider Type    Tameka Darden, PT DPT Physical Therapist    Soni Hayes, PT Student PT Student          PT Recommendation and Plan  Planned Therapy Interventions (PT Eval): balance training, bed mobility training, gait training, home exercise program, patient/family education, strengthening, transfer training  Outcome Summary/Treatment Plan (PT)  Anticipated Discharge Disposition (PT): home with assist, home with home health  Plan of Care Reviewed With: patient  Progress: no change  Outcome Summary: PT evaluation completed. Pt A&O x4. Pt demonstrated decreased balance throughought evaluation. Pt required supervision for sup to sit. He required CGA for all sitting and standing balance d/t leaning, postural sway, and LOB. Pt required CGA and RW during ambulation.  Pt stated that he \"probably will need to use his walker when he gets home\". He was educated on importance of using the RW and on proper use. Pt demonstrated impulsivity when turning w/ RW and had a LOB from which he was able to recover w/ CGA. Feel pt would benefit from skilled PT in order to improve balance and continue practicing using the RW. Recommend d/c home w/ assist and HH.      Time Calculation:   PT Charges     Row Name 12/10/19 1240             Time Calculation    Start Time  1110 add 9 minutes for chart review  -SB (r) MB (t) SB (c)      Stop Time  1146  -SB (r) MB (t) SB (c)      Time Calculation (min)  36 min  -SB (r) MB (t)      PT Received On  12/10/19  -SB (r) MB (t) SB (c)      PT Goal Re-Cert Due Date  12/20/19  -SB (r) MB (t) SB (c)        User Key  (r) = Recorded By, (t) = Taken By, (c) = Cosigned By    Initials Name Provider Type    Tameka Darden PT DPT Physical Therapist    Soni Hayes, PT Student PT Student        Therapy Charges for Today     Code Description Service Date Service Provider Modifiers Qty    [id number]  PT EVAL LOW COMPLEXITY 3 " 12/10/2019 Soni Quesada, PT Student GP 1          PT G-Codes  Outcome Measure Options: AM-PAC 6 Clicks Basic Mobility (PT)  AM-PAC 6 Clicks Score (PT): 19    Soni Quesada, PT Student  12/10/2019

## 2019-12-10 NOTE — PROGRESS NOTES
"[facility] Progress Note    Progress Note    Patient:  Miguel Asif  YOB: 1940  Date of Service: 12/10/2019  MRN: [id number]   Acct: [id number]   Primary Care Physician: Bossman Martinez MD  Advance Directive:   Code Status and Medical Interventions:   Ordered at: 12/08/19 2128     Code Status:    CPR     Medical Interventions (Level of Support Prior to Arrest):    Full     Admit Date: 12/8/2019       Hospital Day: 2      Subjective:     Chief Compliant: \" Hurting under my right rib cage.\"  Seen with nurse Mehul at bedside.  He is receiving his second unit of packed RBC.      Review of Systems:   Review of Systems   Constitutional: Positive for fatigue. Negative for chills, diaphoresis and fever.   HENT: Negative for congestion, nosebleeds and trouble swallowing.    Eyes: Negative for redness and visual disturbance.   Respiratory: Positive for cough and shortness of breath. Negative for wheezing.         White phlegm.   Cardiovascular: Negative for chest pain and palpitations.   Gastrointestinal: Negative for abdominal distention, abdominal pain, nausea and vomiting.   Endocrine: Negative for cold intolerance and heat intolerance.   Genitourinary: Negative for difficulty urinating, flank pain and hematuria.   Musculoskeletal: Negative for joint swelling and neck stiffness.   Skin: Positive for pallor.   Neurological: Negative for dizziness, tremors, facial asymmetry and speech difficulty.   Hematological: Does not bruise/bleed easily.   Psychiatric/Behavioral: Negative for agitation and hallucinations. The patient is not nervous/anxious.            Medications:   Scheduled Meds:  aspirin 81 mg Oral Daily   dronabinol 2.5 mg Oral BID   enoxaparin 40 mg Subcutaneous Q24H   escitalopram 10 mg Oral Daily   famotidine 40 mg Oral Daily   levoFLOXacin 750 mg Intravenous Q24H   sodium chloride 10 mL Intravenous Q12H       Continuous Infusions:  sodium chloride 50 mL/hr Last Rate: 50 mL/hr (12/09/19 " 1907)       Labs:     Lab Results (last 72 hours)     Procedure Component Value Units Date/Time    Basic Metabolic Panel [[id number]]  (Abnormal) Collected:  12/10/19 0437    Specimen:  Blood Updated:  12/10/19 0530     Glucose 130 mg/dL      BUN 12 mg/dL      Creatinine 0.89 mg/dL      Sodium 136 mmol/L      Potassium 3.9 mmol/L      Chloride 101 mmol/L      CO2 29.0 mmol/L      Calcium 8.5 mg/dL      eGFR Non African Amer 82 mL/min/1.73      BUN/Creatinine Ratio 13.5     Anion Gap 6.0 mmol/L     Narrative:       GFR Normal >60  Chronic Kidney Disease <60  Kidney Failure <15      CBC & Differential [[id number]] Collected:  12/10/19 0437    Specimen:  Blood Updated:  12/10/19 0524    Narrative:       The following orders were created for panel order CBC & Differential.  Procedure                               Abnormality         Status                     ---------                               -----------         ------                     CBC Auto Differential[[id number]]        Abnormal            Final result                 Please view results for these tests on the individual orders.    CBC Auto Differential [[id number]]  (Abnormal) Collected:  12/10/19 0437    Specimen:  Blood Updated:  12/10/19 0524     WBC 4.14 10*3/mm3      RBC 2.87 10*6/mm3      Hemoglobin 8.7 g/dL      Hematocrit 27.3 %      MCV 95.1 fL      MCH 30.3 pg      MCHC 31.9 g/dL      RDW 21.2 %      RDW-SD 69.1 fl      MPV 13.4 fL      Platelets 76 10*3/mm3      Neutrophil % 70.3 %      Lymphocyte % 18.4 %      Monocyte % 8.9 %      Eosinophil % 1.2 %      Basophil % 0.2 %      Neutrophils, Absolute 2.91 10*3/mm3      Lymphocytes, Absolute 0.76 10*3/mm3      Monocytes, Absolute 0.37 10*3/mm3      Eosinophils, Absolute 0.05 10*3/mm3      Basophils, Absolute 0.01 10*3/mm3     Blood Culture - Blood, Arm, Left [[id number]] Collected:  12/08/19 1844    Specimen:  Blood from Arm, Left Updated:  12/09/19 1901     Blood Culture No growth at 24 hours     Blood Culture - Blood, Arm, Right [[id number]] Collected:  12/08/19 1839    Specimen:  Blood from Arm, Right Updated:  12/09/19 1846     Blood Culture No growth at 24 hours    CBC & Differential [[id number]] Collected:  12/09/19 0608    Specimen:  Blood Updated:  12/09/19 0641    Narrative:       The following orders were created for panel order CBC & Differential.  Procedure                               Abnormality         Status                     ---------                               -----------         ------                     CBC Auto Differential[[id number]]        Abnormal            Final result                 Please view results for these tests on the individual orders.    CBC Auto Differential [[id number]]  (Abnormal) Collected:  12/09/19 0608    Specimen:  Blood Updated:  12/09/19 0641     WBC 4.09 10*3/mm3      RBC 2.48 10*6/mm3      Hemoglobin 7.6 g/dL      Hematocrit 23.8 %      MCV 96.0 fL      MCH 30.6 pg      MCHC 31.9 g/dL      RDW 20.7 %      RDW-SD 68.5 fl      MPV 13.6 fL      Platelets 71 10*3/mm3      Neutrophil % 73.1 %      Lymphocyte % 14.2 %      Monocyte % 9.0 %      Eosinophil % 2.0 %      Basophil % 0.5 %      Neutrophils, Absolute 2.99 10*3/mm3      Lymphocytes, Absolute 0.58 10*3/mm3      Monocytes, Absolute 0.37 10*3/mm3      Eosinophils, Absolute 0.08 10*3/mm3      Basophils, Absolute 0.02 10*3/mm3     Basic Metabolic Panel [[id number]]  (Abnormal) Collected:  12/09/19 0424    Specimen:  Blood Updated:  12/09/19 0506     Glucose 97 mg/dL      BUN 14 mg/dL      Creatinine 1.00 mg/dL      Sodium 135 mmol/L      Potassium 4.2 mmol/L      Comment: Specimen hemolyzed.  Results may be affected.        Chloride 98 mmol/L      CO2 27.0 mmol/L      Calcium 8.2 mg/dL      eGFR Non African Amer 72 mL/min/1.73      BUN/Creatinine Ratio 14.0     Anion Gap 10.0 mmol/L     Narrative:       GFR Normal >60  Chronic Kidney Disease <60  Kidney Failure <15      S. Pneumo Ag Urine or CSF - Urine,  Urine, Clean Catch [[id number]]  (Normal) Collected:  12/09/19 0346    Specimen:  Urine, Clean Catch Updated:  12/09/19 0444     Strep Pneumo Ag Negative    MRSA Screen Culture - Swab, Nares [[id number]] Collected:  12/08/19 2242    Specimen:  Swab from Nares Updated:  12/08/19 2310    TSH [[id number]]  (Abnormal) Collected:  12/08/19 1715    Specimen:  Blood Updated:  12/08/19 2155     TSH 0.194 uIU/mL     T4, Free [[id number]]  (Abnormal) Collected:  12/08/19 1715    Specimen:  Blood Updated:  12/08/19 2155     Free T4 1.74 ng/dL     Lactic Acid, Plasma [[id number]]  (Normal) Collected:  12/08/19 1839    Specimen:  Blood Updated:  12/08/19 1857     Lactate 0.9 mmol/L     Comprehensive Metabolic Panel [[id number]]  (Abnormal) Collected:  12/08/19 1715    Specimen:  Blood Updated:  12/08/19 1814     Glucose 113 mg/dL      BUN 15 mg/dL      Creatinine 0.89 mg/dL      Sodium 134 mmol/L      Potassium 4.0 mmol/L      Chloride 96 mmol/L      CO2 29.0 mmol/L      Calcium 8.9 mg/dL      Total Protein 7.2 g/dL      Albumin 2.90 g/dL      ALT (SGPT) 40 U/L      AST (SGOT) 36 U/L      Alkaline Phosphatase 210 U/L      Total Bilirubin 0.8 mg/dL      eGFR Non African Amer 82 mL/min/1.73      Globulin 4.3 gm/dL      A/G Ratio 0.7 g/dL      BUN/Creatinine Ratio 16.9     Anion Gap 9.0 mmol/L     Narrative:       GFR Normal >60  Chronic Kidney Disease <60  Kidney Failure <15      BNP [[id number]]  (Normal) Collected:  12/08/19 1715    Specimen:  Blood Updated:  12/08/19 1814     proBNP 870.6 pg/mL     Narrative:       Among patients with dyspnea, NT-proBNP is highly sensitive for the detection of acute congestive heart failure. In addition NT-proBNP of <300 pg/ml effectively rules out acute congestive heart failure with 99% negative predictive value.      Troponin [[id number]]  (Normal) Collected:  12/08/19 1715    Specimen:  Blood Updated:  12/08/19 1814     Troponin T <0.010 ng/mL     Narrative:       Troponin T Reference Range:  <=  0.03 ng/mL-   Negative for AMI  >0.03 ng/mL-     Abnormal for myocardial necrosis.  Clinicians would have to utilize clinical acumen, EKG, Troponin and serial changes to determine if it is an Acute Myocardial Infarction or myocardial injury due to an underlying chronic condition.     CBC & Differential [[id number]] Collected:  12/08/19 1715    Specimen:  Blood Updated:  12/08/19 1753    Narrative:       The following orders were created for panel order CBC & Differential.  Procedure                               Abnormality         Status                     ---------                               -----------         ------                     CBC Auto Differential[[id number]]        Abnormal            Final result                 Please view results for these tests on the individual orders.    CBC Auto Differential [[id number]]  (Abnormal) Collected:  12/08/19 1715    Specimen:  Blood Updated:  12/08/19 1753     WBC 4.47 10*3/mm3      RBC 2.92 10*6/mm3      Hemoglobin 8.9 g/dL      Hematocrit 27.8 %      MCV 95.2 fL      MCH 30.5 pg      MCHC 32.0 g/dL      RDW 21.1 %      RDW-SD 69.1 fl      MPV 13.4 fL      Platelets 89 10*3/mm3      Neutrophil % 64.1 %      Lymphocyte % 22.1 %      Monocyte % 11.4 %      Eosinophil % 1.3 %      Basophil % 0.4 %      Neutrophils, Absolute 2.86 10*3/mm3      Lymphocytes, Absolute 0.99 10*3/mm3      Monocytes, Absolute 0.51 10*3/mm3      Eosinophils, Absolute 0.06 10*3/mm3      Basophils, Absolute 0.02 10*3/mm3     Blood Gas, Arterial [[id number]]  (Abnormal) Collected:  12/08/19 1705    Specimen:  Arterial Blood Updated:  12/08/19 1712     Site Left Brachial     Jose's Test N/A     pH, Arterial 7.433 pH units      pCO2, Arterial 43.0 mm Hg      pO2, Arterial 56.8 mm Hg      Comment: 84 Value below reference range        HCO3, Arterial 28.7 mmol/L      Comment: 83 Value above reference range        Base Excess, Arterial 4.0 mmol/L      Comment: 83 Value above reference range         O2 Saturation, Arterial 90.0 %      Comment: 84 Value below reference range        Temperature 37.0 C      Barometric Pressure for Blood Gas 749 mmHg      Modality Room Air     FIO2 21 %      Ventilator Mode NA     Collected by [id number]     Comment: Meter: [id number]     :  [id number]               Radiology:     Imaging Results (Last 72 Hours)     Procedure Component Value Units Date/Time    CT Head Without Contrast [[id number]] Collected:  12/08/19 1944     Updated:  12/08/19 1950    Narrative:       EXAM: CT OF THE HEAD WITHOUT IV CONTRAST 12/08/2019     COMPARISON: Head CT dated 02/21/2015      INDICATION: Male, 79 years-old. Trauma      PROCEDURE: Non contrast enhanced head CT was performed.      Radiation dose equals DLP 1322 mGy-cm.  Automated exposure control dose  reduction technique was implemented.     FINDINGS: This examination has limited sensitivity for evaluation of  intracranial hemorrhage as patient received intravenous contrast  earlier.  Ventricles and cerebrospinal fluid spaces are normal in size and  configuration for the patient's age. There is no evidence of mass-effect  or midline shift. The gray-white differentiation is preserved. Chronic  microvascular changes. There is no evidence of intracranial contusion,  hemorrhage, or skull fracture.  The visualized portions of the paranasal  sinuses and mastoid air cells are unremarkable.       Impression:       1.  Limited evaluation for intracranial hemorrhage. Presence of  contrast.     2.  Grossly, no acute intracranial abnormalities.  This report was finalized on 12/08/2019 19:47 by Dr. Keesha Alejandra MD.    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  TECHNIQUE:  Abdomen  and pelvis were scanned utilizing a multidetector helical  scanner from the diaphragm to the ischial tuberosities after  administration of IV contrast. Coronal and sagittal reformations were  obtained. [Routine protocol is performed.]     Radiation dose equals  mGy-cm.  Automated exposure control dose  reduction technique was implemented.        FINDINGS:     LINES and TUBES: None.     LOWER THORAX: Please see report from CT chest dated same day.     HEPATOBILIARY:  2 discrete hepatic cysts redemonstrated, measuring 12 x  11 x 10 cm and 6.4 x 8 x 8.2 cm. Additional too small to characterized  hypodensities throughout the liver including at the tip (image 36,  series 5) were seen on prior examination.   No liver laceration.   No  biliary ductal dilatation.      GALLBLADDER:  Cholelithiasis, with innumerable stones. No wall  thickening.      SPLEEN:  No splenomegaly.    No splenic laceration.      PANCREAS:  No focal masses or ductal dilatation.      ADRENALS:  No adrenal nodules.      KIDNEYS/URETERS:  Kidneys enhance symmetrically.   No hydronephrosis.    Too-small -to characterize hypodensities..  No stones.      GI TRACT:  No abnormal distention, wall thickening, or evidence of bowel  obstruction.   No acute appendicitis. Colonic diverticulosis, without  evidence of acute diverticulitis. Large stool ball is present at the  rectosigmoid junction..      PELVIC ORGANS/BLADDER:  No acute finding..      LYMPH NODES:  No inguinal canal, pelvic wall, retroperitoneal or  mesenteric lymphadenopathy by size..      VESSELS:  Atherosclerotic disease. No aortic aneurysm. No evidence of  acute aortic injury.. The portal vein is patent.     PERITONEUM / RETROPERITONEUM:  No free air or fluid.      BONES:  No acute osseous pathology. Chronic L2 compression deformity..      SOFT TISSUES:  Unremarkable.        Impression:       1.  No acute abdominal pelvic abnormalities.  2.  Cholelithiasis, without evidence of acute  cholecystitis.  3.  Colonic diverticulosis, without evidence of acute diverticulitis.  4.  Additional chronic findings as described above.  This report was finalized on 12/08/2019 18:01 by Dr. Keesha Alejandra MD.    CT Angiogram Chest [[id number]] Collected:  12/08/19 1748     Updated:  12/08/19 1759    Narrative:       CT ANGIOGRAM CHEST- 12/8/2019 5:28 PM CST      HISTORY: Chest pain, acute, PE suspected, intermed prob, negative  D-dimer      COMPARISON: CT chest dated 07/26/2019.      Radiation dose equals  mGy-cm.  Automated exposure control dose  reduction technique was implemented.        TECHNIQUE: Helical tomographic images of the chest were obtained after  the administration of intravenous contrast following angiogram protocol.  Additionally, 3D reformatted images were provided.        FINDINGS:    Pulmonary arteries: There is adequate enhancement of the pulmonary  arteries to evaluate for central and segmental pulmonary emboli. There  are no filling defects within the main, lobar, segmental or visualized  subsegmental pulmonary arteries. The pulmonary arteries are within  normal limits.      Aorta and great vessels: There is atherosclerosis in the aorta without  aneurysm or dissection. There is atherosclerosis in the great vessels.     Visualized neck base: The imaged portion of the base of the neck appears  unremarkable.      Lungs: Extensive fibrotic changes throughout the lungs redemonstrated.  Consolidative process in the right lower lobe appears more extensive on  today's examination, with more involvement of the superior segment. More  involvement of the right middle lobe as well. Severe emphysema. There  are bronchiectatic changes as well. Cavitated lesion in the right upper  lobe redemonstrated, grossly similar given difference in techniques.     Heart: The heart is normal in size. There is no pericardial effusion.  Coronary artery disease.     Mediastinum and lymph nodes: No enlarged  "mediastinal, hilar, or axillary  lymph nodes are present.      Skeletal and soft tissues: The osseous structures of the thorax and  surrounding soft tissues demonstrate no acute process. Chronic wedging  at L2 redemonstrated. Chronic T10 wedging also redemonstrated.     Upper abdomen: Please see report from CT abdomen pelvis dated same day..          Impression:       1.  No evidence of pulmonary embolus.  2.  Increasing consolidative process in the right lower lobe and right  middle lobe. This is highly concerning for a typical infection.  3.  Extensive emphysema..        This report was finalized on 12/08/2019 17:56 by Dr. Keesha Alejandra MD.            Objective:   Vitals: /73   Pulse 79   Temp 97.7 °F (36.5 °C)   Resp 16   Ht 170.2 cm (67\")   Wt 65.8 kg (145 lb 1.6 oz)   SpO2 95%   BMI 22.73 kg/m²   Physical Exam   Constitutional: He is oriented to person, place, and time. No distress.   Frail looking.   HENT:   Head: Normocephalic and atraumatic.   Eyes: No scleral icterus.   Cardiovascular: Normal rate and regular rhythm.   Pulmonary/Chest: No respiratory distress. He has no wheezes. He has no rales.   Abdominal: Soft. Bowel sounds are normal. He exhibits no distension. There is no tenderness.   Musculoskeletal: He exhibits no edema.   Lymphadenopathy:     He has no cervical adenopathy.   Neurological: He is alert and oriented to person, place, and time.   Skin: Skin is warm and dry. He is not diaphoretic. There is pallor.   Psychiatric: He has a normal mood and affect. His behavior is normal.   Vitals reviewed.    24HR INTAKE/OUTPUT:      Intake/Output Summary (Last 24 hours) at 12/10/2019 0656  Last data filed at 12/10/2019 0500  Gross per 24 hour   Intake 1921.67 ml   Output 1000 ml   Net 921.67 ml        Problem list:       Pneumonia of right lung due to infectious organism    MDS (myelodysplastic syndrome) (CMS/[facility])    Nocturnal hypoxemia    Scarring of lung    COPD (chronic obstructive " pulmonary disease) (CMS/HCC)    Bilateral hearing loss    Bullous emphysema (CMS/HCC)      Assessment/Plan:     ASSESSMENT:  1.    Pneumonia   2.    COPD with exacerbation   3.    Anemia, macrocytic, from myelodysplasia (refractory anemia with ringed sideroblasts), iron deficiency (now with iron excess from multiple blood transfusions), and B12 deficiency.   4.    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   Followed at [place].  No tissue diagnosis.  Stable per CT chest 12/08/2019.  6.    Possible confluent lymphadenopathy in the celiac axis, measuring 1.8 cm transversely and 3.5 cm longitudinally. Followed at [place]. Not apparent 12/08/2019.   7.    Hepatic cysts.     8.    Coronary artery disease.  9.    Poor appetite and weight loss  10.  Failure to thrive, multifactorial to include elements from all the above.     PLAN:   1.    Hemoglobin 8.7 from 7.6 and platelets 76 from 71.  2.    Procrit 40,000 units subcutaneous Fridays if hemoglobin below 12 and hematocrit below 36.  Observe for intolerance.  3.    Transfuse 2 units packed RBCs if hemoglobin less than 8, symptomatic anemia.  4.    Antibiotics per primary.  5.    We will sign off.  No further recommendations.  Please call if needed.  6     Clinic appointment arranged.  7.    Above discussed with patient and nurse Carver.  They verbalized understanding.

## 2019-12-10 NOTE — DISCHARGE SUMMARY
[facility] Medicine Services  DISCHARGE SUMMARY     Date of Admission: 12/8/2019  Date of Discharge:  12/10/2019  Primary Care Physician: Zari Sandoval APRN    Presenting Problem/History of Present Illness:  Pneumonia of right lung due to infectious organism, unspecified part of lung [J18.9]     Discharge Diagnoses:  Active Hospital Problems    Diagnosis   • **Pneumonia of right lung due to infectious organism   • Acute respiratory failure with hypoxia (CMS/[facility])   • Thrombocytopenia, acquired (CMS/[facility])     Thrombocytopenia, unspecified     • Anemia, macrocytic   • Bilateral hearing loss   • Bullous emphysema (CMS/HCC)   • Nocturnal hypoxemia   • Scarring of lung   • COPD (chronic obstructive pulmonary disease) (CMS/HCC)   • MDS (myelodysplastic syndrome) (CMS/[facility])     Chief Complaint on Day of Discharge: No sputum production.  Is to go home  History of Present Illness on Day of Discharge:   Lying in bed.  Wife not in room.  However, I did call and speak with his wife on the phone.  He denies chest pain, palpitations.  Oxygen at 2 L.  No sputum production.  Denies nausea, vomiting or abdominal pain.  Very hard of hearing.  He was seen by hematology earlier today and has follow-up appointment arranged.    Consults: Dr. Martinez/Cathy, hematology    Procedures Performed: none    Pertinent Test Results:   Laboratory Data:    Results from last 7 days   Lab Units 12/10/19  0437 12/09/19  0608 12/08/19  1715   WBC 10*3/mm3 4.14 4.09 4.47   HEMOGLOBIN g/dL 8.7* 7.6* 8.9*   HEMATOCRIT % 27.3* 23.8* 27.8*   PLATELETS 10*3/mm3 76* 71* 89*        Results from last 7 days   Lab Units 12/10/19  0437 12/09/19  0424 12/08/19  1715 12/06/19  1202   SODIUM mmol/L 136 135* 134* 133*   POTASSIUM mmol/L 3.9 4.2 4.0 3.9   CHLORIDE mmol/L 101 98 96* 94*   CO2 mmol/L 29.0 27.0 29.0 30.0*   BUN mg/dL 12 14 15 17   CREATININE mg/dL 0.89 1.00 0.89 0.94   CALCIUM mg/dL 8.5* 8.2* 8.9 8.7   BILIRUBIN  mg/dL  --   --  0.8 1.2   ALK PHOS U/L  --   --  210* 227*   ALT (SGPT) U/L  --   --  40 40   AST (SGOT) U/L  --   --  36 43*   GLUCOSE mg/dL 130* 97 113* 112*     Lab Results (all)     Procedure Component Value Units Date/Time    MRSA Screen Culture - Swab, Nares [[id number]]  (Normal) Collected:  12/08/19 2242    Specimen:  Swab from Nares Updated:  12/10/19 1024     MRSA SCREEN CX No Methicillin Resistant Staphylococcus aureus isolated    Basic Metabolic Panel [[id number]]  (Abnormal) Collected:  12/10/19 0437    Specimen:  Blood Updated:  12/10/19 0530     Glucose 130 mg/dL      BUN 12 mg/dL      Creatinine 0.89 mg/dL      Sodium 136 mmol/L      Potassium 3.9 mmol/L      Chloride 101 mmol/L      CO2 29.0 mmol/L      Calcium 8.5 mg/dL      eGFR Non African Amer 82 mL/min/1.73      BUN/Creatinine Ratio 13.5     Anion Gap 6.0 mmol/L     Narrative:       GFR Normal >60  Chronic Kidney Disease <60  Kidney Failure <15      CBC & Differential [[id number]] Collected:  12/10/19 0437    Specimen:  Blood Updated:  12/10/19 0524    Narrative:       The following orders were created for panel order CBC & Differential.  Procedure                               Abnormality         Status                     ---------                               -----------         ------                     CBC Auto Differential[[id number]]        Abnormal            Final result                 Please view results for these tests on the individual orders.    CBC Auto Differential [[id number]]  (Abnormal) Collected:  12/10/19 0437    Specimen:  Blood Updated:  12/10/19 0524     WBC 4.14 10*3/mm3      RBC 2.87 10*6/mm3      Hemoglobin 8.7 g/dL      Hematocrit 27.3 %      MCV 95.1 fL      MCH 30.3 pg      MCHC 31.9 g/dL      RDW 21.2 %      RDW-SD 69.1 fl      MPV 13.4 fL      Platelets 76 10*3/mm3      Neutrophil % 70.3 %      Lymphocyte % 18.4 %      Monocyte % 8.9 %      Eosinophil % 1.2 %      Basophil % 0.2 %      Neutrophils, Absolute 2.91  10*3/mm3      Lymphocytes, Absolute 0.76 10*3/mm3      Monocytes, Absolute 0.37 10*3/mm3      Eosinophils, Absolute 0.05 10*3/mm3      Basophils, Absolute 0.01 10*3/mm3     Blood Culture - Blood, Arm, Left [[id number]] Collected:  12/08/19 1844    Specimen:  Blood from Arm, Left Updated:  12/09/19 1901     Blood Culture No growth at 24 hours    Blood Culture - Blood, Arm, Right [[id number]] Collected:  12/08/19 1839    Specimen:  Blood from Arm, Right Updated:  12/09/19 1846     Blood Culture No growth at 24 hours    CBC & Differential [[id number]] Collected:  12/09/19 0608    Specimen:  Blood Updated:  12/09/19 0641    Narrative:       The following orders were created for panel order CBC & Differential.  Procedure                               Abnormality         Status                     ---------                               -----------         ------                     CBC Auto Differential[[id number]]        Abnormal            Final result                 Please view results for these tests on the individual orders.    CBC Auto Differential [[id number]]  (Abnormal) Collected:  12/09/19 0608    Specimen:  Blood Updated:  12/09/19 0641     WBC 4.09 10*3/mm3      RBC 2.48 10*6/mm3      Hemoglobin 7.6 g/dL      Hematocrit 23.8 %      MCV 96.0 fL      MCH 30.6 pg      MCHC 31.9 g/dL      RDW 20.7 %      RDW-SD 68.5 fl      MPV 13.6 fL      Platelets 71 10*3/mm3      Neutrophil % 73.1 %      Lymphocyte % 14.2 %      Monocyte % 9.0 %      Eosinophil % 2.0 %      Basophil % 0.5 %      Neutrophils, Absolute 2.99 10*3/mm3      Lymphocytes, Absolute 0.58 10*3/mm3      Monocytes, Absolute 0.37 10*3/mm3      Eosinophils, Absolute 0.08 10*3/mm3      Basophils, Absolute 0.02 10*3/mm3     Basic Metabolic Panel [[id number]]  (Abnormal) Collected:  12/09/19 0424    Specimen:  Blood Updated:  12/09/19 0506     Glucose 97 mg/dL      BUN 14 mg/dL      Creatinine 1.00 mg/dL      Sodium 135 mmol/L      Potassium 4.2 mmol/L       Comment: Specimen hemolyzed.  Results may be affected.        Chloride 98 mmol/L      CO2 27.0 mmol/L      Calcium 8.2 mg/dL      eGFR Non African Amer 72 mL/min/1.73      BUN/Creatinine Ratio 14.0     Anion Gap 10.0 mmol/L     Narrative:       GFR Normal >60  Chronic Kidney Disease <60  Kidney Failure <15      S. Pneumo Ag Urine or CSF - Urine, Urine, Clean Catch [[id number]]  (Normal) Collected:  12/09/19 0346    Specimen:  Urine, Clean Catch Updated:  12/09/19 0444     Strep Pneumo Ag Negative    TSH [[id number]]  (Abnormal) Collected:  12/08/19 1715    Specimen:  Blood Updated:  12/08/19 2155     TSH 0.194 uIU/mL     T4, Free [[id number]]  (Abnormal) Collected:  12/08/19 1715    Specimen:  Blood Updated:  12/08/19 2155     Free T4 1.74 ng/dL     Lactic Acid, Plasma [[id number]]  (Normal) Collected:  12/08/19 1839    Specimen:  Blood Updated:  12/08/19 1857     Lactate 0.9 mmol/L     Comprehensive Metabolic Panel [[id number]]  (Abnormal) Collected:  12/08/19 1715    Specimen:  Blood Updated:  12/08/19 1814     Glucose 113 mg/dL      BUN 15 mg/dL      Creatinine 0.89 mg/dL      Sodium 134 mmol/L      Potassium 4.0 mmol/L      Chloride 96 mmol/L      CO2 29.0 mmol/L      Calcium 8.9 mg/dL      Total Protein 7.2 g/dL      Albumin 2.90 g/dL      ALT (SGPT) 40 U/L      AST (SGOT) 36 U/L      Alkaline Phosphatase 210 U/L      Total Bilirubin 0.8 mg/dL      eGFR Non African Amer 82 mL/min/1.73      Globulin 4.3 gm/dL      A/G Ratio 0.7 g/dL      BUN/Creatinine Ratio 16.9     Anion Gap 9.0 mmol/L     Narrative:       GFR Normal >60  Chronic Kidney Disease <60  Kidney Failure <15      BNP [[id number]]  (Normal) Collected:  12/08/19 1715    Specimen:  Blood Updated:  12/08/19 1814     proBNP 870.6 pg/mL     Narrative:       Among patients with dyspnea, NT-proBNP is highly sensitive for the detection of acute congestive heart failure. In addition NT-proBNP of <300 pg/ml effectively rules out acute congestive heart failure with  99% negative predictive value.      Troponin [[id number]]  (Normal) Collected:  12/08/19 1715    Specimen:  Blood Updated:  12/08/19 1814     Troponin T <0.010 ng/mL     Narrative:       Troponin T Reference Range:  <= 0.03 ng/mL-   Negative for AMI  >0.03 ng/mL-     Abnormal for myocardial necrosis.  Clinicians would have to utilize clinical acumen, EKG, Troponin and serial changes to determine if it is an Acute Myocardial Infarction or myocardial injury due to an underlying chronic condition.     CBC & Differential [[id number]] Collected:  12/08/19 1715    Specimen:  Blood Updated:  12/08/19 1753    Narrative:       The following orders were created for panel order CBC & Differential.  Procedure                               Abnormality         Status                     ---------                               -----------         ------                     CBC Auto Differential[[id number]]        Abnormal            Final result                 Please view results for these tests on the individual orders.    CBC Auto Differential [[id number]]  (Abnormal) Collected:  12/08/19 1715    Specimen:  Blood Updated:  12/08/19 1753     WBC 4.47 10*3/mm3      RBC 2.92 10*6/mm3      Hemoglobin 8.9 g/dL      Hematocrit 27.8 %      MCV 95.2 fL      MCH 30.5 pg      MCHC 32.0 g/dL      RDW 21.1 %      RDW-SD 69.1 fl      MPV 13.4 fL      Platelets 89 10*3/mm3      Neutrophil % 64.1 %      Lymphocyte % 22.1 %      Monocyte % 11.4 %      Eosinophil % 1.3 %      Basophil % 0.4 %      Neutrophils, Absolute 2.86 10*3/mm3      Lymphocytes, Absolute 0.99 10*3/mm3      Monocytes, Absolute 0.51 10*3/mm3      Eosinophils, Absolute 0.06 10*3/mm3      Basophils, Absolute 0.02 10*3/mm3     Blood Gas, Arterial [[id number]]  (Abnormal) Collected:  12/08/19 1705    Specimen:  Arterial Blood Updated:  12/08/19 1712     Site Left Brachial     Jose's Test N/A     pH, Arterial 7.433 pH units      pCO2, Arterial 43.0 mm Hg      pO2, Arterial 56.8 mm Hg       Comment: 84 Value below reference range        HCO3, Arterial 28.7 mmol/L      Comment: 83 Value above reference range        Base Excess, Arterial 4.0 mmol/L      Comment: 83 Value above reference range        O2 Saturation, Arterial 90.0 %      Comment: 84 Value below reference range        Temperature 37.0 C      Barometric Pressure for Blood Gas 749 mmHg      Modality Room Air     FIO2 21 %      Ventilator Mode NA     Collected by [id number]     Comment: Meter: [id number]     :  [id number]           Imaging Results (All)     Procedure Component Value Units Date/Time    CT Head Without Contrast [[id number]] Collected:  12/08/19 1944     Updated:  12/08/19 1950    Narrative:       EXAM: CT OF THE HEAD WITHOUT IV CONTRAST 12/08/2019     COMPARISON: Head CT dated 02/21/2015      INDICATION: Male, 79 years-old. Trauma      PROCEDURE: Non contrast enhanced head CT was performed.      Radiation dose equals DLP 1322 mGy-cm.  Automated exposure control dose  reduction technique was implemented.     FINDINGS: This examination has limited sensitivity for evaluation of  intracranial hemorrhage as patient received intravenous contrast  earlier.  Ventricles and cerebrospinal fluid spaces are normal in size and  configuration for the patient's age. There is no evidence of mass-effect  or midline shift. The gray-white differentiation is preserved. Chronic  microvascular changes. There is no evidence of intracranial contusion,  hemorrhage, or skull fracture.  The visualized portions of the paranasal  sinuses and mastoid air cells are unremarkable.       Impression:       1.  Limited evaluation for intracranial hemorrhage. Presence of  contrast.     2.  Grossly, no acute intracranial abnormalities.  This report was finalized on 12/08/2019 19:47 by Dr. Keesha Alejandra MD.    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  TECHNIQUE:  Abdomen and pelvis were scanned utilizing a multidetector helical  scanner from the diaphragm to the ischial tuberosities after  administration of IV contrast. Coronal and sagittal reformations were  obtained. [Routine protocol is performed.]     Radiation dose equals  mGy-cm.  Automated exposure control dose  reduction technique was implemented.        FINDINGS:     LINES and TUBES: None.     LOWER THORAX: Please see report from CT chest dated same day.     HEPATOBILIARY:  2 discrete hepatic cysts redemonstrated, measuring 12 x  11 x 10 cm and 6.4 x 8 x 8.2 cm. Additional too small to characterized  hypodensities throughout the liver including at the tip (image 36,  series 5) were seen on prior examination.   No liver laceration.   No  biliary ductal dilatation.      GALLBLADDER:  Cholelithiasis, with innumerable stones. No wall  thickening.      SPLEEN:  No splenomegaly.    No splenic laceration.      PANCREAS:  No focal masses or ductal dilatation.      ADRENALS:  No adrenal nodules.      KIDNEYS/URETERS:  Kidneys enhance symmetrically.   No hydronephrosis.    Too-small -to characterize hypodensities..  No stones.      GI TRACT:  No abnormal distention, wall thickening, or evidence of bowel  obstruction.   No acute appendicitis. Colonic diverticulosis, without  evidence of acute diverticulitis. Large stool ball is present at the  rectosigmoid junction..      PELVIC ORGANS/BLADDER:  No acute finding..      LYMPH NODES:  No inguinal canal, pelvic wall, retroperitoneal or  mesenteric lymphadenopathy by size..      VESSELS:  Atherosclerotic disease. No aortic aneurysm. No evidence of  acute aortic injury.. The portal vein is patent.     PERITONEUM / RETROPERITONEUM:  No free air or fluid.      BONES:  No acute osseous pathology.  Chronic L2 compression deformity..      SOFT TISSUES:  Unremarkable.        Impression:       1.  No acute abdominal pelvic abnormalities.  2.  Cholelithiasis, without evidence of acute cholecystitis.  3.  Colonic diverticulosis, without evidence of acute diverticulitis.  4.  Additional chronic findings as described above.  This report was finalized on 12/08/2019 18:01 by Dr. Keesha Alejandra MD.    CT Angiogram Chest [[id number]] Collected:  12/08/19 1748     Updated:  12/08/19 1759    Narrative:       CT ANGIOGRAM CHEST- 12/8/2019 5:28 PM CST      HISTORY: Chest pain, acute, PE suspected, intermed prob, negative  D-dimer      COMPARISON: CT chest dated 07/26/2019.      Radiation dose equals  mGy-cm.  Automated exposure control dose  reduction technique was implemented.        TECHNIQUE: Helical tomographic images of the chest were obtained after  the administration of intravenous contrast following angiogram protocol.  Additionally, 3D reformatted images were provided.        FINDINGS:    Pulmonary arteries: There is adequate enhancement of the pulmonary  arteries to evaluate for central and segmental pulmonary emboli. There  are no filling defects within the main, lobar, segmental or visualized  subsegmental pulmonary arteries. The pulmonary arteries are within  normal limits.      Aorta and great vessels: There is atherosclerosis in the aorta without  aneurysm or dissection. There is atherosclerosis in the great vessels.     Visualized neck base: The imaged portion of the base of the neck appears  unremarkable.      Lungs: Extensive fibrotic changes throughout the lungs redemonstrated.  Consolidative process in the right lower lobe appears more extensive on  today's examination, with more involvement of the superior segment. More  involvement of the right middle lobe as well. Severe emphysema. There  are bronchiectatic changes as well. Cavitated lesion in the right upper  lobe redemonstrated, grossly  similar given difference in techniques.     Heart: The heart is normal in size. There is no pericardial effusion.  Coronary artery disease.     Mediastinum and lymph nodes: No enlarged mediastinal, hilar, or axillary  lymph nodes are present.      Skeletal and soft tissues: The osseous structures of the thorax and  surrounding soft tissues demonstrate no acute process. Chronic wedging  at L2 redemonstrated. Chronic T10 wedging also redemonstrated.     Upper abdomen: Please see report from CT abdomen pelvis dated same day..          Impression:       1.  No evidence of pulmonary embolus.  2.  Increasing consolidative process in the right lower lobe and right  middle lobe. This is highly concerning for a typical infection.  3.  Extensive emphysema..        This report was finalized on 12/08/2019 17:56 by Dr. Keesha Alejandra MD.        Imaging Results (All)     Procedure Component Value Units Date/Time    CT Head Without Contrast [[id number]] Collected:  12/08/19 1944     Updated:  12/08/19 1950    Narrative:       EXAM: CT OF THE HEAD WITHOUT IV CONTRAST 12/08/2019     COMPARISON: Head CT dated 02/21/2015      INDICATION: Male, 79 years-old. Trauma      PROCEDURE: Non contrast enhanced head CT was performed.      Radiation dose equals DLP 1322 mGy-cm.  Automated exposure control dose  reduction technique was implemented.     FINDINGS: This examination has limited sensitivity for evaluation of  intracranial hemorrhage as patient received intravenous contrast  earlier.  Ventricles and cerebrospinal fluid spaces are normal in size and  configuration for the patient's age. There is no evidence of mass-effect  or midline shift. The gray-white differentiation is preserved. Chronic  microvascular changes. There is no evidence of intracranial contusion,  hemorrhage, or skull fracture.  The visualized portions of the paranasal  sinuses and mastoid air cells are unremarkable.       Impression:       1.  Limited evaluation for  intracranial hemorrhage. Presence of  contrast.     2.  Grossly, no acute intracranial abnormalities.  This report was finalized on 12/08/2019 19:47 by Dr. Keesha Alejandra MD.    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  TECHNIQUE:  Abdomen and pelvis were scanned utilizing a multidetector helical  scanner from the diaphragm to the ischial tuberosities after  administration of IV contrast. Coronal and sagittal reformations were  obtained. [Routine protocol is performed.]     Radiation dose equals  mGy-cm.  Automated exposure control dose  reduction technique was implemented.        FINDINGS:     LINES and TUBES: None.     LOWER THORAX: Please see report from CT chest dated same day.     HEPATOBILIARY:  2 discrete hepatic cysts redemonstrated, measuring 12 x  11 x 10 cm and 6.4 x 8 x 8.2 cm. Additional too small to characterized  hypodensities throughout the liver including at the tip (image 36,  series 5) were seen on prior examination.   No liver laceration.   No  biliary ductal dilatation.      GALLBLADDER:  Cholelithiasis, with innumerable stones. No wall  thickening.      SPLEEN:  No splenomegaly.    No splenic laceration.      PANCREAS:  No focal masses or ductal dilatation.      ADRENALS:  No adrenal nodules.      KIDNEYS/URETERS:  Kidneys enhance symmetrically.   No hydronephrosis.    Too-small -to characterize hypodensities..  No stones.      GI TRACT:  No abnormal distention, wall thickening, or evidence of bowel  obstruction.   No acute appendicitis. Colonic diverticulosis, without  evidence of acute diverticulitis. Large stool ball is present at the  rectosigmoid junction..      PELVIC ORGANS/BLADDER:  No acute finding..      LYMPH  NODES:  No inguinal canal, pelvic wall, retroperitoneal or  mesenteric lymphadenopathy by size..      VESSELS:  Atherosclerotic disease. No aortic aneurysm. No evidence of  acute aortic injury.. The portal vein is patent.     PERITONEUM / RETROPERITONEUM:  No free air or fluid.      BONES:  No acute osseous pathology. Chronic L2 compression deformity..      SOFT TISSUES:  Unremarkable.        Impression:       1.  No acute abdominal pelvic abnormalities.  2.  Cholelithiasis, without evidence of acute cholecystitis.  3.  Colonic diverticulosis, without evidence of acute diverticulitis.  4.  Additional chronic findings as described above.  This report was finalized on 12/08/2019 18:01 by Dr. Keesha Alejandra MD.    CT Angiogram Chest [[id number]] Collected:  12/08/19 1748     Updated:  12/08/19 1759    Narrative:       CT ANGIOGRAM CHEST- 12/8/2019 5:28 PM CST      HISTORY: Chest pain, acute, PE suspected, intermed prob, negative  D-dimer      COMPARISON: CT chest dated 07/26/2019.      Radiation dose equals  mGy-cm.  Automated exposure control dose  reduction technique was implemented.        TECHNIQUE: Helical tomographic images of the chest were obtained after  the administration of intravenous contrast following angiogram protocol.  Additionally, 3D reformatted images were provided.        FINDINGS:    Pulmonary arteries: There is adequate enhancement of the pulmonary  arteries to evaluate for central and segmental pulmonary emboli. There  are no filling defects within the main, lobar, segmental or visualized  subsegmental pulmonary arteries. The pulmonary arteries are within  normal limits.      Aorta and great vessels: There is atherosclerosis in the aorta without  aneurysm or dissection. There is atherosclerosis in the great vessels.     Visualized neck base: The imaged portion of the base of the neck appears  unremarkable.      Lungs: Extensive fibrotic changes throughout the lungs  redemonstrated.  Consolidative process in the right lower lobe appears more extensive on  today's examination, with more involvement of the superior segment. More  involvement of the right middle lobe as well. Severe emphysema. There  are bronchiectatic changes as well. Cavitated lesion in the right upper  lobe redemonstrated, grossly similar given difference in techniques.     Heart: The heart is normal in size. There is no pericardial effusion.  Coronary artery disease.     Mediastinum and lymph nodes: No enlarged mediastinal, hilar, or axillary  lymph nodes are present.      Skeletal and soft tissues: The osseous structures of the thorax and  surrounding soft tissues demonstrate no acute process. Chronic wedging  at L2 redemonstrated. Chronic T10 wedging also redemonstrated.     Upper abdomen: Please see report from CT abdomen pelvis dated same day..          Impression:       1.  No evidence of pulmonary embolus.  2.  Increasing consolidative process in the right lower lobe and right  middle lobe. This is highly concerning for a typical infection.  3.  Extensive emphysema..        This report was finalized on 12/08/2019 17:56 by Dr. Keesha Alejandra MD.        Culture Data:   Blood Culture   Date Value Ref Range Status   12/08/2019 No growth at 24 hours  Preliminary   12/08/2019 No growth at 24 hours  Preliminary       Hospital Course  Patient is a 79 y.o. male presented to [facility] emergency room 12/8/2019 with complaints of sided chest pain worse with and moving.  Pain has increased over the last 2 to 3 days, not associated with fever cough.  He has a history of macrocytic anemia from myelodysplasia and vitamin B12 deficiency.  He is followed by Dr. Reba Warner, pulmonary medicine at [place] for right upper lobe cavitary mass.  He presented with worsening shortness of breath, weakness and right-sided pain for 2 days.  Hemoglobin 8.9, hematocrit 27.8.  Patient requires intermittent  transfusions per hematology.  CT angiogram the chest showed no evidence of pulmonary embolus.  Increasing consolidative process in the right upper lobe and right middle lobe.  Highly concerning for atypical infection.  Extensive emphysema.  CT scan of the abdomen no acute abdominal pelvic abnormalities.  Cholelithiasis without evidence of acute cholecystitis.  Colonic diverticulosis without evidence of acute diverticulitis.  CT scan of the head noted no acute intracranial abnormalities.  Wife reported that since last hospitalization his mobility has declined and he stays in the bed most of the day.  She also reported decreased appetite.  He has tried Megace in the past no longer taking.  PO2 56 on room air.  WBC 4.47, hemoglobin 8.9, hematocrit 27.8.  Patient has a history of nocturnal hypoxia and normally wears oxygen at night.  However, wife indicated he has been wearing oxygen more frequently throughout the daytime recently.  He received azithromycin, Zosyn and normal saline fluid bolus in the emergency room.    He was admitted to the medical floor with right upper lobe pneumonia.  He was placed on IV fluids, fall precautions, given regular diet and started on Marinol, Levaquin IV antibiotic.  Blood cultures remain no growth.  Strep pneumonia negative.  MRSA screen negative.  Incentive spirometry and OPEP ordered.  Patient had no sputum production.  He remained on oxygen at 2.5 L.  He already has oxygen at home.  He has remained afebrile, no sputum production, white blood cell count normal throughout hospital stay.    He was noted to be hypoxic upon admission.  PO2 56 on room air.  He was placed on nasal cannula and continued on nasal cannula through hospital stay.  He has oxygen at home and will continue after discharge.    Hematology consulted for anemia.  He was seen by Dr. Morgan.  Patient requires intermittent blood transfusions.  Hemoglobin 7.6 with hematocrit 23.8 the day after admission.  He was  "transfused 2 units packed red blood cells.  Hemoglobin 8.7 with hematocrit 27.3.  Patient will keep scheduled follow-up appointment with hematology.    On 12/10/2019 he is stable for discharge.  He has remained afebrile, white blood cell count within normal limits.  No sputum production.  Lungs clear.  Patient reports right-sided chest pain with movement.  Wife not present in room but I called and discussed plan of care with her on the phone.  We discussed potential discharge today or tomorrow.  After our conversation, she called the nurse taking care of patient, Zeinab, and explained that she would like for patient to go home later this afternoon after wife returns from doctor's appointment.  Have arranged follow-up with Elly Sandoval on 2/18/2019.    Physical Exam on Discharge:  /98   Pulse 77   Temp 97.8 °F (36.6 °C) (Oral)   Resp 18   Ht 170.2 cm (67\")   Wt 65.8 kg (145 lb 1.6 oz)   SpO2 91%   BMI 22.73 kg/m²   Physical Exam  Constitutional: He is oriented to person, place, and time.   Sitting up in bed.  Oxygen 2.5 L. No acute distress.   HENT:   Head: Normocephalic.   Eyes: Pupils are equal, round, and reactive to light. EOM are normal.   Neck: Normal range of motion. Neck supple.   Cardiovascular: Normal rate, regular rhythm, normal heart sounds and intact distal pulses. Exam reveals no gallop and no friction rub.   No murmur heard.  Pulmonary/Chest: He has no wheezes. He has no rales.   Oxygen 2.5 L.  Decreased breath sounds posterior right.   Abdominal: Soft. Bowel sounds are normal. He exhibits no distension. There is no tenderness.   Genitourinary:   Genitourinary Comments: Voiding.   Musculoskeletal: Normal range of motion. He exhibits no edema or deformity.   Neurological: He is alert and oriented to person, place, and time.   Skin: Skin is warm and dry. No rash noted. No erythema.   Psychiatric: He has a normal mood and affect. His behavior is normal. Judgment and thought content normal. "      Condition on Discharge: Stable    Discharge Disposition: Home with wife    Discharge Diet:   Diet Instructions     Advance Diet As Tolerated          Activity at Discharge:   Activity Instructions     Activity as Tolerated          Discharge Care Plan / Instructions:   1.  For symptoms of cough, congestion, fever chills seek medical attention.  2.  Continue Levaquin after discharge.  3.  Mucinex 1200 mg twice daily  4.  Oxygen PRN shortness of breath.  Has oxygen at 2 L to wear at night and as needed throughout the day.  5.  Megace 200 mg orally daily  6.  Follow-up with VANNESSA Polk 12/18/2019  7.  Keep scheduled point with Dr. Martinez 1/9/20    Discharge Medications:     Discharge Medications      New Medications      Instructions Start Date   guaiFENesin 600 MG 12 hr tablet  Commonly known as:  MUCINEX   1,200 mg, Oral, 2 Times Daily      levoFLOXacin 750 MG tablet  Commonly known as:  LEVAQUIN   750 mg, Oral, Every 24 Hours         Changes to Medications      Instructions Start Date   megestrol 40 MG/ML suspension  Commonly known as:  MEGACE  What changed:  See the new instructions.   200 mg, Oral, Daily         Continue These Medications      Instructions Start Date   aspirin 81 MG EC tablet   81 mg, Oral, Daily      escitalopram 10 MG tablet  Commonly known as:  LEXAPRO   10 mg, Oral, Daily      levothyroxine 125 MCG tablet  Commonly known as:  SYNTHROID, LEVOTHROID   125 mcg, Oral, Daily      PROCRIT 54389 UNIT/ML injection  Generic drug:  epoetin brady   Pt gets weekly at [facility] ELLIPTA 100-62.5-25 MCG/INH aerosol powder   Generic drug:  Fluticasone-Umeclidin-Vilant   1 each, Inhalation, Daily      VENTOLIN  (90 Base) MCG/ACT inhaler  Generic drug:  albuterol sulfate HFA   INHALE 1-2 PUFFS EVERY 4-6HRS AS NEEDED           Follow-up Appointments:   Follow-up Information     Bossman Martinez MD Follow up on 1/9/2020.    Specialty:  Hematology and Oncology  Why:  January 9th At  2:00 ([facility] and [facility]rit [facility]).      Contact information:  [address]  [place] [address]  [phone]             Zari Sandoval APRN Follow up on 12/18/2019.    Specialty:  Nurse Practitioner  Why:  December 18th At 3:00  Contact information:  [address] [place] [address]   [place] [address]  [phone]                 Future Appointments:  Future Appointments   Date Time Provider Department Center   12/13/2019 12:30 PM BH PAD CANCER CTR LAB [facility] PAD CCLAB PAD   12/13/2019  1:00 PM TX ROOM BH PAD OP INFU ONC BH PAD OIONC PAD   12/20/2019 12:30 PM BH PAD CANCER CTR LAB BH PAD CCLAB PAD   12/27/2019 12:30 PM BH PAD CANCER CTR LAB BH PAD CCLAB PAD   1/3/2020 12:30 PM BH PAD CANCER CTR LAB BH PAD CCLAB PAD   1/9/2020  2:00 PM Bossman Martinez MD [facility] ONC PAD PAD   1/10/2020 12:30 PM BH PAD CANCER CTR LAB BH PAD CCLAB PAD   1/17/2020 12:30 PM BH PAD CANCER CTR LAB BH PAD CCLAB PAD   1/24/2020 12:30 PM BH PAD CANCER CTR LAB BH PAD CCLAB PAD       Test Results Pending at Discharge: none    The above documentation resulted from a face-to-face encounter by me Iqra HURD, [facility].    VANNESSA Horan  12/10/19  10:31 AM    Time: This discharge process required 35 minutes for completion.    Plan discussed with Dr. Clarke, patient, and wife.    Time spent in face-to-face evaluation, chart review, planning and education 35 minutes.      I personally evaluated and examined the patient in conjunction with VANNESSA Jarvis and agree with the assessment, treatment plan, and disposition of the patient as recorded by her. My history, exam, and further recommendations are: I have reviewed and agree with the plans. Silvia Clarke MD  12/10/19  4:31 PM

## 2019-12-11 ENCOUNTER — READMISSION MANAGEMENT (OUTPATIENT)
Dept: CALL CENTER | Facility: HOSPITAL | Age: 79
End: 2019-12-11

## 2019-12-11 NOTE — OUTREACH NOTE
Prep Survey      Responses   Facility patient discharged from?  [facility]   Is patient eligible?  Yes   Discharge diagnosis  Pneumonia of right lung d/t infectious organism, acute resp. failure with hypoxia, thrombocytopenia, anemia, bilat. hearing loss, bullous emphysema, nocturnal hypoxemia, scarring of lung, COPD, MDS   Does the patient have one of the following disease processes/diagnoses(primary or secondary)?  COPD/Pneumonia   Does the patient have Home health ordered?  No   Is there a DME ordered?  No   Comments regarding appointments  See AVS   Prep survey completed?  Yes          Cami Morales RN

## 2019-12-11 NOTE — THERAPY DISCHARGE NOTE
Acute Care - Physical Therapy Discharge Summary  [facility]       Patient Name: Miguel Asif  : 1940  MRN: [id number]    Today's Date: 2019                 Admit Date: 2019      PT Recommendation and Plan    Visit Dx:    ICD-10-CM ICD-9-CM   1. Pneumonia of right lung due to infectious organism, unspecified part of lung J18.9 483.8   2. Impaired mobility Z74.09 799.89               Rehab Goal Summary     Row Name 19 0941             Bed Mobility Goal 1 (PT)    Activity/Assistive Device (Bed Mobility Goal 1, PT)  sit to supine/supine to sit  -AB      [place] Level/Cues Needed (Bed Mobility Goal 1, PT)  supervision required  -AB      Time Frame (Bed Mobility Goal 1, PT)  long term goal (LTG)  -AB      Progress/Outcomes (Bed Mobility Goal 1, PT)  goal not met  -AB         Transfer Goal 1 (PT)    Activity/Assistive Device (Transfer Goal 1, PT)  sit-to-stand/stand-to-sit;walker, rolling  -AB      [place] Level/Cues Needed (Transfer Goal 1, PT)  supervision required  -AB      Time Frame (Transfer Goal 1, PT)  long term goal (LTG)  -AB      Progress/Outcome (Transfer Goal 1, PT)  goal not met  -AB         Gait Training Goal 1 (PT)    Activity/Assistive Device (Gait Training Goal 1, PT)  gait (walking locomotion);assistive device use;decrease fall risk;improve balance and speed;increase endurance/gait distance;walker, rolling  -AB      [place] Level (Gait Training Goal 1, PT)  contact guard assist  -AB      Distance (Gait Goal 1, PT)  150 ft  -AB      Time Frame (Gait Training Goal 1, PT)  long term goal (LTG)  -AB      Progress/Outcome (Gait Training Goal 1, PT)  goal not met  -AB         Stairs Goal 1 (PT)    Activity/Assistive Device (Stairs Goal 1, PT)  ascending stairs;descending stairs w/ or w/o appropriate AD  -AB      [place] Level/Cues Needed (Stairs Goal 1, PT)  contact guard assist  -AB      Number of Stairs (Stairs Goal 1, PT)  3  -AB      Time Frame (Stairs Goal 1,  PT)  long term goal (LTG)  -AB      Progress/Outcome (Stairs Goal 1, PT)  goal not met  -AB         Patient Education Goal (PT)    Activity (Patient Education Goal, PT)  Pt demonstrates appropriate use of RW while turning during ambulation  -AB      [place]/Cues/Accuracy (Memory Goal 2, PT)  demonstrates adequately;independent  -AB      Time Frame (Patient Education Goal, PT)  long term goal (LTG)  -AB      Progress/Outcome (Patient Education Goal, PT)  goal not met  -AB        User Key  (r) = Recorded By, (t) = Taken By, (c) = Cosigned By    Initials Name Provider Type Discipline    Phoebe Quinonez, PTA Physical Therapy Assistant PT              PT Discharge Summary  Anticipated Discharge Disposition (PT): home with assist, home with home health  Reason for Discharge: Discharge from facility  Outcomes Achieved: Discharge from facility occurred on same date as evluation, Refer to plan of care for updates on goals achieved  Discharge Destination: Home with assist      Phoebe Gandara, LASHON   12/11/2019

## 2019-12-12 ENCOUNTER — READMISSION MANAGEMENT (OUTPATIENT)
Dept: CALL CENTER | Facility: HOSPITAL | Age: 79
End: 2019-12-12

## 2019-12-12 NOTE — OUTREACH NOTE
COPD/PN Week 1 Survey      Responses   Facility patient discharged from?  [facility]   Does the patient have one of the following disease processes/diagnoses(primary or secondary)?  COPD/Pneumonia   Is there a successful TCM telephone encounter documented?  No   Week 1 attempt successful?  Yes   Call start time  1122   Revoke  Decline to participate [will call Hospice this week , wife said]   Call end time  1124   Is patient permission given to speak with other caregiver?  Yes   List who call center can speak with  wife   Person spoke with today (if not patient) and relationship  Faith Wife          Stephanie Healy RN

## 2019-12-13 LAB
ABO + RH BLD: NORMAL
ABO + RH BLD: NORMAL
BACTERIA SPEC AEROBE CULT: NORMAL
BACTERIA SPEC AEROBE CULT: NORMAL
BH BB BLOOD EXPIRATION DATE: NORMAL
BH BB BLOOD EXPIRATION DATE: NORMAL
BH BB BLOOD TYPE BARCODE: 5100
BH BB BLOOD TYPE BARCODE: 5100
BH BB DISPENSE STATUS: NORMAL
BH BB DISPENSE STATUS: NORMAL
BH BB PRODUCT CODE: NORMAL
BH BB PRODUCT CODE: NORMAL
BH BB UNIT NUMBER: NORMAL
BH BB UNIT NUMBER: NORMAL
UNIT  ABO: NORMAL
UNIT  ABO: NORMAL
UNIT  RH: NORMAL
UNIT  RH: NORMAL

## 2019-12-16 ENCOUNTER — TELEPHONE (OUTPATIENT)
Dept: ONCOLOGY | Facility: CLINIC | Age: 79
End: 2019-12-16

## 2019-12-16 NOTE — TELEPHONE ENCOUNTER
Hospice is appropriate.    Refer to hospice if agreed. Cancel all appt, Procrit, and labs if they agreed to hospice.

## 2019-12-16 NOTE — TELEPHONE ENCOUNTER
Received call from patient wife, she questions where patient stands with his prognosis, should they call Hospice in or proceed with txt.     Does not have f/u apt with you until Jan 9/2020

## 2019-12-17 ENCOUNTER — TELEPHONE (OUTPATIENT)
Dept: ONCOLOGY | Facility: CLINIC | Age: 79
End: 2019-12-17

## 2019-12-17 NOTE — TELEPHONE ENCOUNTER
Spoke with patient wife, she is still unsure if they should give up any type of treatments and go with Hospice Services. They do have a in-home meeting leonor with Hospice today 12/17/19 to discuss what kind of services Hospice offers. Patient does have f/u apt leonor for Jan 9/ 2020 they do intend to keep this apt.

## 2019-12-17 NOTE — TELEPHONE ENCOUNTER
Received call from patient wife Faith, she reports that they did have initial consultation visit with Hospice and have declined their services at this time. Patient wants to continue to fight and not give up, and patient will be here at the Center on Friday 12/20/19 for his lab work and possible Procrit Injection.

## 2019-12-20 ENCOUNTER — LAB (OUTPATIENT)
Dept: LAB | Facility: HOSPITAL | Age: 79
End: 2019-12-20

## 2019-12-20 ENCOUNTER — INFUSION (OUTPATIENT)
Dept: ONCOLOGY | Facility: HOSPITAL | Age: 79
End: 2019-12-20

## 2019-12-20 VITALS
WEIGHT: 160 LBS | HEART RATE: 87 BPM | OXYGEN SATURATION: 97 % | SYSTOLIC BLOOD PRESSURE: 146 MMHG | DIASTOLIC BLOOD PRESSURE: 70 MMHG | BODY MASS INDEX: 22.4 KG/M2 | TEMPERATURE: 98.9 F | HEIGHT: 71 IN

## 2019-12-20 DIAGNOSIS — D50.9 IRON DEFICIENCY ANEMIA, UNSPECIFIED IRON DEFICIENCY ANEMIA TYPE: ICD-10-CM

## 2019-12-20 DIAGNOSIS — D53.9 MACROCYTIC ANEMIA: ICD-10-CM

## 2019-12-20 DIAGNOSIS — D46.9 MYELODYSPLASTIC SYNDROME (HCC): ICD-10-CM

## 2019-12-20 DIAGNOSIS — D46.9 MDS (MYELODYSPLASTIC SYNDROME) (HCC): Primary | ICD-10-CM

## 2019-12-20 LAB
BASOPHILS # BLD AUTO: 0.02 10*3/MM3 (ref 0–0.2)
BASOPHILS NFR BLD AUTO: 0.4 % (ref 0–1.5)
DEPRECATED RDW RBC AUTO: 73.3 FL (ref 37–54)
EOSINOPHIL # BLD AUTO: 0.08 10*3/MM3 (ref 0–0.4)
EOSINOPHIL NFR BLD AUTO: 1.6 % (ref 0.3–6.2)
ERYTHROCYTE [DISTWIDTH] IN BLOOD BY AUTOMATED COUNT: 23 % (ref 12.3–15.4)
HCT VFR BLD AUTO: 32.2 % (ref 37.5–51)
HGB BLD-MCNC: 10.2 G/DL (ref 13–17.7)
HOLD SPECIMEN: NORMAL
LYMPHOCYTES # BLD AUTO: 0.99 10*3/MM3 (ref 0.7–3.1)
LYMPHOCYTES NFR BLD AUTO: 19.5 % (ref 19.6–45.3)
MCH RBC QN AUTO: 29.1 PG (ref 26.6–33)
MCHC RBC AUTO-ENTMCNC: 31.7 G/DL (ref 31.5–35.7)
MCV RBC AUTO: 91.7 FL (ref 79–97)
MONOCYTES # BLD AUTO: 0.47 10*3/MM3 (ref 0.1–0.9)
MONOCYTES NFR BLD AUTO: 9.3 % (ref 5–12)
NEUTROPHILS # BLD AUTO: 3.44 10*3/MM3 (ref 1.7–7)
NEUTROPHILS NFR BLD AUTO: 67.8 % (ref 42.7–76)
PLATELET # BLD AUTO: 71 10*3/MM3 (ref 140–450)
PMV BLD AUTO: 13.4 FL (ref 6–12)
RBC # BLD AUTO: 3.51 10*6/MM3 (ref 4.14–5.8)
WBC NRBC COR # BLD: 5.07 10*3/MM3 (ref 3.4–10.8)

## 2019-12-20 PROCEDURE — 96372 THER/PROPH/DIAG INJ SC/IM: CPT

## 2019-12-20 PROCEDURE — 85025 COMPLETE CBC W/AUTO DIFF WBC: CPT | Performed by: INTERNAL MEDICINE

## 2019-12-20 PROCEDURE — 25010000002 EPOETIN ALFA PER 1000 UNITS: Performed by: INTERNAL MEDICINE

## 2019-12-20 PROCEDURE — 36415 COLL VENOUS BLD VENIPUNCTURE: CPT

## 2019-12-20 RX ADMIN — ERYTHROPOIETIN 40000 UNITS: 40000 INJECTION, SOLUTION INTRAVENOUS; SUBCUTANEOUS at 13:01

## 2019-12-27 ENCOUNTER — INFUSION (OUTPATIENT)
Dept: ONCOLOGY | Facility: HOSPITAL | Age: 79
End: 2019-12-27

## 2019-12-27 ENCOUNTER — LAB (OUTPATIENT)
Dept: LAB | Facility: HOSPITAL | Age: 79
End: 2019-12-27

## 2019-12-27 VITALS
OXYGEN SATURATION: 94 % | TEMPERATURE: 98.9 F | DIASTOLIC BLOOD PRESSURE: 70 MMHG | RESPIRATION RATE: 18 BRPM | HEART RATE: 116 BPM | SYSTOLIC BLOOD PRESSURE: 128 MMHG

## 2019-12-27 DIAGNOSIS — D64.9 ANEMIA, UNSPECIFIED TYPE: ICD-10-CM

## 2019-12-27 DIAGNOSIS — D50.9 IRON DEFICIENCY ANEMIA, UNSPECIFIED IRON DEFICIENCY ANEMIA TYPE: ICD-10-CM

## 2019-12-27 DIAGNOSIS — D46.9 MYELODYSPLASTIC SYNDROME (HCC): ICD-10-CM

## 2019-12-27 DIAGNOSIS — D46.9 MDS (MYELODYSPLASTIC SYNDROME) (HCC): Primary | ICD-10-CM

## 2019-12-27 DIAGNOSIS — D46.9 MDS (MYELODYSPLASTIC SYNDROME) (HCC): ICD-10-CM

## 2019-12-27 LAB
BASOPHILS # BLD AUTO: 0.03 10*3/MM3 (ref 0–0.2)
BASOPHILS NFR BLD AUTO: 0.2 % (ref 0–1.5)
DEPRECATED RDW RBC AUTO: 71.7 FL (ref 37–54)
EOSINOPHIL # BLD AUTO: 0.21 10*3/MM3 (ref 0–0.4)
EOSINOPHIL NFR BLD AUTO: 1.5 % (ref 0.3–6.2)
ERYTHROCYTE [DISTWIDTH] IN BLOOD BY AUTOMATED COUNT: 24.1 % (ref 12.3–15.4)
FERRITIN SERPL-MCNC: 3493 NG/ML (ref 30–400)
HCT VFR BLD AUTO: 29.7 % (ref 37.5–51)
HGB BLD-MCNC: 9.9 G/DL (ref 13–17.7)
IRON 24H UR-MRATE: 24 MCG/DL (ref 59–158)
IRON SATN MFR SERPL: 12 % (ref 20–50)
LYMPHOCYTES # BLD AUTO: 1.27 10*3/MM3 (ref 0.7–3.1)
LYMPHOCYTES NFR BLD AUTO: 8.9 % (ref 19.6–45.3)
MCH RBC QN AUTO: 29.8 PG (ref 26.6–33)
MCHC RBC AUTO-ENTMCNC: 33.3 G/DL (ref 31.5–35.7)
MCV RBC AUTO: 89.5 FL (ref 79–97)
MONOCYTES # BLD AUTO: 0.79 10*3/MM3 (ref 0.1–0.9)
MONOCYTES NFR BLD AUTO: 5.5 % (ref 5–12)
NEUTROPHILS # BLD AUTO: 11.82 10*3/MM3 (ref 1.7–7)
NEUTROPHILS NFR BLD AUTO: 82.4 % (ref 42.7–76)
PLATELET # BLD AUTO: 106 10*3/MM3 (ref 140–450)
RBC # BLD AUTO: 3.32 10*6/MM3 (ref 4.14–5.8)
TIBC SERPL-MCNC: 194 MCG/DL (ref 298–536)
TRANSFERRIN SERPL-MCNC: 130 MG/DL (ref 200–360)
WBC NRBC COR # BLD: 14.33 10*3/MM3 (ref 3.4–10.8)

## 2019-12-27 PROCEDURE — 25010000002 EPOETIN ALFA PER 1000 UNITS: Performed by: INTERNAL MEDICINE

## 2019-12-27 PROCEDURE — 82728 ASSAY OF FERRITIN: CPT

## 2019-12-27 PROCEDURE — 84466 ASSAY OF TRANSFERRIN: CPT

## 2019-12-27 PROCEDURE — 96372 THER/PROPH/DIAG INJ SC/IM: CPT

## 2019-12-27 PROCEDURE — 85025 COMPLETE CBC W/AUTO DIFF WBC: CPT

## 2019-12-27 PROCEDURE — 36415 COLL VENOUS BLD VENIPUNCTURE: CPT

## 2019-12-27 PROCEDURE — 83540 ASSAY OF IRON: CPT

## 2019-12-27 RX ADMIN — ERYTHROPOIETIN 40000 UNITS: 40000 INJECTION, SOLUTION INTRAVENOUS; SUBCUTANEOUS at 12:55

## 2019-12-27 NOTE — PROGRESS NOTES
1240 Called doctors office and spoke with Martha RN to report spike in WBC and ANC. Martha d/w doctor and reports ok to treat with procrit. Alicia Pope RN

## 2020-01-01 ENCOUNTER — TELEPHONE (OUTPATIENT)
Dept: INTERNAL MEDICINE CLINIC | Age: 80
End: 2020-01-01

## 2020-01-01 ENCOUNTER — OFFICE VISIT (OUTPATIENT)
Dept: INTERNAL MEDICINE | Age: 80
End: 2020-01-01
Payer: MEDICARE

## 2020-01-01 VITALS
OXYGEN SATURATION: 94 % | DIASTOLIC BLOOD PRESSURE: 58 MMHG | SYSTOLIC BLOOD PRESSURE: 108 MMHG | HEART RATE: 102 BPM | RESPIRATION RATE: 20 BRPM

## 2020-01-01 PROCEDURE — G8427 DOCREV CUR MEDS BY ELIG CLIN: HCPCS | Performed by: NURSE PRACTITIONER

## 2020-01-01 PROCEDURE — G8420 CALC BMI NORM PARAMETERS: HCPCS | Performed by: NURSE PRACTITIONER

## 2020-01-01 PROCEDURE — 1123F ACP DISCUSS/DSCN MKR DOCD: CPT | Performed by: NURSE PRACTITIONER

## 2020-01-01 PROCEDURE — 1036F TOBACCO NON-USER: CPT | Performed by: NURSE PRACTITIONER

## 2020-01-01 PROCEDURE — G8482 FLU IMMUNIZE ORDER/ADMIN: HCPCS | Performed by: NURSE PRACTITIONER

## 2020-01-01 PROCEDURE — 99214 OFFICE O/P EST MOD 30 MIN: CPT | Performed by: NURSE PRACTITIONER

## 2020-01-01 PROCEDURE — 4040F PNEUMOC VAC/ADMIN/RCVD: CPT | Performed by: NURSE PRACTITIONER

## 2020-01-01 RX ORDER — HYDROCODONE BITARTRATE AND ACETAMINOPHEN 7.5; 325 MG/1; MG/1
1 TABLET ORAL EVERY 6 HOURS PRN
Qty: 120 TABLET | Refills: 0 | Status: SHIPPED | OUTPATIENT
Start: 2020-01-01 | End: 2020-01-01

## 2020-01-01 RX ORDER — LEVOTHYROXINE SODIUM 0.12 MG/1
TABLET ORAL
Qty: 90 TABLET | Refills: 1 | Status: SHIPPED | OUTPATIENT
Start: 2020-01-01

## 2020-01-01 ASSESSMENT — ENCOUNTER SYMPTOMS
NAUSEA: 0
EYE ITCHING: 0
VOMITING: 0
TROUBLE SWALLOWING: 0
DIARRHEA: 0
COLOR CHANGE: 0
WHEEZING: 0
CONSTIPATION: 0
ABDOMINAL DISTENTION: 0
SHORTNESS OF BREATH: 1
ABDOMINAL PAIN: 0
STRIDOR: 0
BLOOD IN STOOL: 0
CHOKING: 0
COUGH: 0
EYE DISCHARGE: 0
SORE THROAT: 0

## 2020-01-03 ENCOUNTER — INFUSION (OUTPATIENT)
Dept: ONCOLOGY | Facility: HOSPITAL | Age: 80
End: 2020-01-03

## 2020-01-03 ENCOUNTER — LAB (OUTPATIENT)
Dept: LAB | Facility: HOSPITAL | Age: 80
End: 2020-01-03

## 2020-01-03 VITALS
OXYGEN SATURATION: 97 % | WEIGHT: 154 LBS | HEIGHT: 71 IN | BODY MASS INDEX: 21.56 KG/M2 | HEART RATE: 83 BPM | SYSTOLIC BLOOD PRESSURE: 149 MMHG | DIASTOLIC BLOOD PRESSURE: 80 MMHG | TEMPERATURE: 98.3 F

## 2020-01-03 DIAGNOSIS — D50.9 IRON DEFICIENCY ANEMIA, UNSPECIFIED IRON DEFICIENCY ANEMIA TYPE: ICD-10-CM

## 2020-01-03 DIAGNOSIS — D64.9 ANEMIA, UNSPECIFIED TYPE: ICD-10-CM

## 2020-01-03 DIAGNOSIS — D46.9 MYELODYSPLASTIC SYNDROME (HCC): ICD-10-CM

## 2020-01-03 LAB
ALBUMIN SERPL-MCNC: 3 G/DL (ref 3.5–5.2)
ALBUMIN/GLOB SERPL: 0.7 G/DL
ALP SERPL-CCNC: 240 U/L (ref 39–117)
ALT SERPL W P-5'-P-CCNC: 43 U/L (ref 1–41)
ANION GAP SERPL CALCULATED.3IONS-SCNC: 10 MMOL/L (ref 5–15)
AST SERPL-CCNC: 33 U/L (ref 1–40)
BASOPHILS # BLD AUTO: 0.03 10*3/MM3 (ref 0–0.2)
BASOPHILS NFR BLD AUTO: 0.5 % (ref 0–1.5)
BILIRUB SERPL-MCNC: 0.7 MG/DL (ref 0.2–1.2)
BUN BLD-MCNC: 19 MG/DL (ref 8–23)
BUN/CREAT SERPL: 19.6 (ref 7–25)
CALCIUM SPEC-SCNC: 9 MG/DL (ref 8.6–10.5)
CHLORIDE SERPL-SCNC: 96 MMOL/L (ref 98–107)
CO2 SERPL-SCNC: 26 MMOL/L (ref 22–29)
CREAT BLD-MCNC: 0.97 MG/DL (ref 0.76–1.27)
DEPRECATED RDW RBC AUTO: 78.5 FL (ref 37–54)
EOSINOPHIL # BLD AUTO: 0.15 10*3/MM3 (ref 0–0.4)
EOSINOPHIL NFR BLD AUTO: 2.7 % (ref 0.3–6.2)
ERYTHROCYTE [DISTWIDTH] IN BLOOD BY AUTOMATED COUNT: 25.2 % (ref 12.3–15.4)
GFR SERPL CREATININE-BSD FRML MDRD: 74 ML/MIN/1.73
GLOBULIN UR ELPH-MCNC: 4.5 GM/DL
GLUCOSE BLD-MCNC: 97 MG/DL (ref 65–99)
HCT VFR BLD AUTO: 30.5 % (ref 37.5–51)
HGB BLD-MCNC: 9.8 G/DL (ref 13–17.7)
LYMPHOCYTES # BLD AUTO: 1.02 10*3/MM3 (ref 0.7–3.1)
LYMPHOCYTES NFR BLD AUTO: 18.1 % (ref 19.6–45.3)
MCH RBC QN AUTO: 30.2 PG (ref 26.6–33)
MCHC RBC AUTO-ENTMCNC: 32.1 G/DL (ref 31.5–35.7)
MCV RBC AUTO: 93.8 FL (ref 79–97)
MONOCYTES # BLD AUTO: 0.52 10*3/MM3 (ref 0.1–0.9)
MONOCYTES NFR BLD AUTO: 9.3 % (ref 5–12)
NEUTROPHILS # BLD AUTO: 3.8 10*3/MM3 (ref 1.7–7)
NEUTROPHILS NFR BLD AUTO: 67.6 % (ref 42.7–76)
PLATELET # BLD AUTO: 122 10*3/MM3 (ref 140–450)
PMV BLD AUTO: 13.9 FL (ref 6–12)
POTASSIUM BLD-SCNC: 4 MMOL/L (ref 3.5–5.2)
PROT SERPL-MCNC: 7.5 G/DL (ref 6–8.5)
RBC # BLD AUTO: 3.25 10*6/MM3 (ref 4.14–5.8)
SODIUM BLD-SCNC: 132 MMOL/L (ref 136–145)
WBC NRBC COR # BLD: 5.62 10*3/MM3 (ref 3.4–10.8)

## 2020-01-03 PROCEDURE — G0463 HOSPITAL OUTPT CLINIC VISIT: HCPCS

## 2020-01-03 PROCEDURE — 36415 COLL VENOUS BLD VENIPUNCTURE: CPT

## 2020-01-03 PROCEDURE — 85025 COMPLETE CBC W/AUTO DIFF WBC: CPT

## 2020-01-03 PROCEDURE — 80053 COMPREHEN METABOLIC PANEL: CPT

## 2020-01-03 NOTE — PROGRESS NOTES
S/w Joni Grant RN with Dr. Martinez.  Hold Retacrit today for Iron sat of 12%. She will discuss with Dr. Martinez. Kendra Norris

## 2020-01-03 NOTE — PROGRESS NOTES
"[facility] GROUP HEMATOLOGY AND ONCOLOGY  [address]  [place] 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  He is on B12 shots.  He had Injectafer 4.75 months ago. He had no response to oral iron in the past. He had blood transfusion 1.5 months ago.  He is on MADELAINE.  He is seen with spouse.  He is a marginal historian.     He was admitted 12/08/2019 for pneumonia. He was seen on consult 12/09/2019.  He was discharged 12/17/2019.    Chest x-ray 12/18/2019. New nodular density in the right lung base with evidence of volume loss and surrounding parenchymal changes. Additional nodular densities throughout the right lung appears similar to the prior CT exam. Underlying lung mass not excluded. Correlate clinically, with follow-up nonemergent CT chest recommended following resolution of acute symptoms.    Spouse asking about hospice 12/17/2019.  Patient declined hospice.      Problem List Items Addressed This Visit     None        Oncology/Hematology History    DIAGNOSTIC ABNORMALITIES:    CBC on [facility]11/[facility] revealed a WBC of 7.2, hemoglobin 10, hematocrit 31.2, , and platelet count 119,000 with a normal differential.   CBC on 04/14/2016 revealed a WBC of 5.7, hemoglobin 8.5, hematocrit 26.8, .7, and platelet count 119,000 with a normal differential.   Occult blood x3 was negative on 04/28/2016.   Endoscopy on 05/24/2016 by Dr. Ahmet Segovia revealed gastritis, otherwise normal endoscopy. The patient considered for a colonoscopy secondary to weight loss.  Colonoscopy was negative 2011 per " patient.  CBC on 06/09/2016 revealed a WBC of 5.8, hemoglobin 9, hematocrit 28.6, .4, and platelet count 142,000 with a normal differential.   Previous iron panel on 06/09/2016 revealed an iron saturation of 63, iron 163, TIBC 257, ferritin 122, and methylmalonic acid was normal at 284.  PATHOLOGY: [facility], comprehensive report, 08/16/2016. Findings consistent with refractory anemia with ringed sideroblasts; bone marrow, aspirate and biopsy: High-normocellular marrow (approximately 40-50% cellularity) with trilineage hematopoiesis showing a left shift in maturation and erythroid hyperplasia. Moderate dyserythropoiesis. Increased storage iron with frequent ringed sideroblasts (greater than 15%) Comprehensive comment:   The findings in the bone marrow could be compatible with a myelodysplastic syndrome, specifically refractory anemia with ringed sideroblasts. Other non-neoplastic causes of ringed sideroblasts must be excluded clinically including drug/toxin effect, zinc administration, copper deficiency, and alcohol. Clinical correlation and followup required. Cytogenetic studies reveal a normal male karyotype. FISH for common abnormalities associated with myelodysplastic syndrome is negative. ALIREZA F0125S and SF3B1 K700E mutations were detected by targeted next generation sequencing. No other mutations were detected by targeted next generation sequencing interrogating hot-spot regions of 85 genes. In particular, no mutations were detected in genes including KIT, NPM1, FLT3, CEBPA, CALR, DNMT3A, JAK2, IDH1 or IDH2. ALIREZA mutations have been described in a variety of hematologic malignancies including lymphomas and leukemias. Targeted therapies are available in a clinical context for mutant ALIREZA (i.e, PARP inhibitors). SF3B1 encodes for a protein involved in RNA spliceosome machinery and mutations in this gene have been implicated in a number of hematologic neoplasms including CLL, MDS, MPN, overlap MDS/MPN and  AML. In the setting of MDS, there was a significant association of SF3B1 mutations with the presence of ringed sideroblasts and of mutant allele burden with their proportion. In multivariate analysis including established risk factors, SF3B1 mutations were found to be independently associated with better overall survival and lower risk of evolution into AML. Cytogenomic array analysis revealed no significant gain, loss, or copy-neutral loss of heterozygosity on any chromosomal region. Cytogenetic Impression: Bone marrow, aspirate: Normal male karyotype 46,XY[20]. Flow Impression: Bone marrow, aspirate: Immunophenotypic abnormalities of myeloid blasts, maturing myeloid, and monocytic populations identified. No monoclonal B-cell population or immunophenotypically abnormal T-cell population identified.  No monoclonal plasma cell population identified. FISH Impression: Bone marrow, aspirate: Negative for the tested MDS-associated genetic abnormalities. Theranostic summary: ALIREZA N2079D and SF3B1 K700E mutations were detected by targeted next generation sequencing. A NORMAL cytogenomic array result was detected.     PREVIOUS INTERVENTIONS:   Vitamin B12, 1000 mg IM 07/15/2016 through present at the [facility].   Injectafer 750 mg 09/28/2016, 12/01/2016, and 06/22/2018 at the [facility].    Injecatfer 750 mg 07/12/2019 at [facility].  He had no response to oral iron in the past.   Procrit 01/04/2017 through present at the [facility].  2 units PRBCs 01/10/2018 and 11/26/2019 at [facility].        MDS (myelodysplastic syndrome) (CMS/HCC)    9/28/2017 Initial Diagnosis     MDS (myelodysplastic syndrome) (CMS/HCC)         PAST MEDICAL HISTORY:  ALLERGIES:  No Known Allergies  CURRENT MEDICATIONS:  Outpatient Encounter Medications as of 1/9/2020   Medication Sig Dispense Refill   • aspirin 81 MG EC tablet Take 81 mg by mouth Daily.     • epoetin  brady (PROCRIT) 98024 UNIT/ML injection Pt gets weekly at cancer center     • escitalopram (LEXAPRO) 10 MG tablet Take 10 mg by mouth Daily.     • Fluticasone-Umeclidin-Vilant (TRELEGY ELLIPTA) 100-62.5-25 MCG/INH aerosol powder  Inhale 1 each Daily.     • guaiFENesin (MUCINEX) 600 MG 12 hr tablet Take 2 tablets by mouth 2 (Two) Times a Day. 20 tablet 0   • levothyroxine (SYNTHROID, LEVOTHROID) 125 MCG tablet Take 125 mcg by mouth Daily.     • megestrol (MEGACE) 40 MG/ML suspension Take 5 mL by mouth Daily. 150 mL 0   • simvastatin (ZOCOR) 20 MG tablet Take 20 mg by mouth Every Night.     • VENTOLIN  (90 Base) MCG/ACT inhaler INHALE 1-2 PUFFS EVERY 4-6HRS AS NEEDED  11     No facility-administered encounter medications on file as of 1/9/2020.      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   • Anemia, macrocytic D53.9   • Pneumonia of right lower lobe due to infectious organism (CMS/formerly [facility]) J18.1   • Acute blood loss anemia D62   • Shortness of breath R06.02   • Pneumonia of right lung due to infectious organism J18.9   • Acute respiratory failure with hypoxia (CMS/formerly [facility]) J96.01   • Thrombocytopenia, acquired (CMS/formerly [facility]) D69.6     SURGERIES:  Past Surgical History:   Procedure Laterality Date   • CATARACT EXTRACTION, BILATERAL     • CORONARY ARTERY BYPASS GRAFT  2005    x4   • EAR MASTOIDECTOMY W/ COCHLEAR IMPLANT W/ LANDMARK Right 2003   • THYROID SURGERY     • TOTAL THYROIDECTOMY  1974     HEALTH MAINTENANCE ITEMS:  Health Maintenance Due   Topic Date Due   • ZOSTER VACCINE (1 of 2) 04/03/1989   • Pneumococcal Vaccine Once at 65 Years Old  04/03/2004   • MEDICARE ANNUAL WELLNESS  09/12/2017   • INFLUENZA VACCINE  08/01/2019   •  "DXA SCAN  2019       <no information>  Last Completed Colonoscopy     Patient has no health maintenance due at this time        Immunization History   Administered Date(s) Administered   • Tdap 10/01/2018     Last Completed Mammogram     Patient has no health maintenance due at this time            FAMILY HISTORY:  Family History   Problem Relation Age of Onset   • Dementia Mother    • Heart failure Father    • Scoliosis Sister    • Dementia Brother    • Esophageal varices Son    • No Known Problems Brother    • Depression Son    • Suicidality Son    • Obesity Son      SOCIAL HISTORY:  Social History     Socioeconomic History   • Marital status:      Spouse name: Not on file   • Number of children: Not on file   • Years of education: Not on file   • Highest education level: Not on file   Tobacco Use   • Smoking status: Former Smoker     Last attempt to quit:      Years since quittin.0   • Smokeless tobacco: Never Used   • Tobacco comment: uses vape   Substance and Sexual Activity   • Alcohol use: Yes     Alcohol/week: 4.0 - 6.0 standard drinks     Types: 4 - 6 Shots of liquor per week     Comment: [email or web address]   • Drug use: No   • Sexual activity: Defer       REVIEW OF SYSTEMS:    Review of Systems   Constitutional: Positive for fatigue. Negative for chills, diaphoresis and fever.        \"I can't even get dress without being short of breath.\"   HENT: Negative for congestion, nosebleeds, sinus pressure and sore throat.    Eyes: Negative for blurred vision, double vision, pain and visual disturbance.   Respiratory: Positive for shortness of breath. Negative for cough and wheezing.    Cardiovascular: Negative for chest pain and palpitations.   Gastrointestinal: Negative for abdominal pain, blood in stool, constipation, diarrhea, nausea and vomiting.   Endocrine: Negative for cold intolerance and heat intolerance.   Genitourinary: Negative for difficulty urinating, dysuria, hematuria and urinary " "incontinence.   Musculoskeletal: Negative for arthralgias, gait problem and joint swelling.   Skin: Positive for pallor.   Allergic/Immunologic: Negative for food allergies.   Neurological: Negative for dizziness, tremors, seizures, syncope, speech difficulty, headache and confusion.   Hematological: Negative for adenopathy. Does not bruise/bleed easily.   Psychiatric/Behavioral: Negative for agitation and hallucinations.         VITAL SIGNS: /72   Pulse 96   Temp 97.7 °F (36.5 °C)   Resp 18   Ht 180.3 cm (71\")   Wt 71.8 kg (158 lb 4.8 oz)   SpO2 97%   BMI 22.08 kg/m²  Body surface area is 1.91 meters squared.   Pain Score    01/09/20 0906   PainSc: 0-No pain         PHYSICAL EXAMINATION:     Physical Exam   Constitutional: He is oriented to person, place, and time. No distress.   Frail looking.  He arrived in the exam room in a wheelchair.   HENT:   Head: Normocephalic and atraumatic.   Eyes: EOM are normal. No scleral icterus.   Neck: Trachea normal. Neck supple.   Cardiovascular: Normal rate, regular rhythm and normal pulses. Exam reveals no friction rub.   No murmur heard.  Pulmonary/Chest: He has no wheezes. He has no rhonchi. He has no rales. Chest wall is not dull to percussion.   Abdominal: Soft. Normal appearance and bowel sounds are normal. There is no tenderness. There is no rebound and no guarding.   Musculoskeletal: He exhibits no edema.   Lymphadenopathy:     He has no cervical adenopathy.     He has no axillary adenopathy.        Right: No inguinal and no supraclavicular adenopathy present.        Left: No inguinal and no supraclavicular adenopathy present.   Neurological: He is alert and oriented to person, place, and time. He has normal strength. No sensory deficit.   Skin: Skin is warm and dry. He is not diaphoretic. There is pallor.   Psychiatric: He has a normal mood and affect. His behavior is normal. Judgment and thought content normal.   Vitals reviewed.      LABS    Lab Results - " Last 18 Months   Lab Units 01/03/20  1135 12/27/19  1153 12/20/19  1145 12/10/19  0437 12/09/19  0608 12/08/19  1715 12/06/19  1202  11/15/19  1130 11/08/19  1159 10/25/19  1146  08/30/19  1109  08/16/19  1154  07/29/19  0516 07/28/19  0610  07/26/19  0507  06/21/19  1151 06/14/19  1155  05/17/19  1138 05/10/19  1210 05/03/19  1209  04/19/19  1128  04/12/19  1149 04/05/19  1415   HEMOGLOBIN g/dL 9.8* 9.9* 10.2* 8.7* 7.6* 8.9* 8.9*   < > 9.2* 7.5* 7.7*   < > 9.3*   < > 10.0*   < > 9.6* 8.0*   < > 7.3*   < > 8.7* 8.7*   < > 8.5* 8.6* 8.7*   < > 8.4*  --  9.0* 8.4*   HEMATOCRIT % 30.5* 29.7* 32.2* 27.3* 23.8* 27.8* 27.9*   < > 29.2* 23.9* 24.6*   < > 29.7*   < > 31.7*   < > 29.5* 25.2*   < > 23.5*   < > 27.2* 28.0*   < > 27.3* 27.1* 27.9*   < > 27.3*  --  28.3* 27.3*   MCV fL 93.8 89.5 91.7 95.1 96.0 95.2 94.3   < > 101.0* 104.8* 111.3*   < > 100.7*   < > 99.1*   < > 95.8* 100.8*   < > 103.5*   < > 111.0* 111.1*   < > 111.9* 111.5* 111.2*   < > 112.3*  --  111.9* 112.8*   WBC 10*3/mm3 5.62 14.33* 5.07 4.14 4.09 4.47 5.05   < > 6.25 7.37 6.56   < > 6.24   < > 5.51   < > 5.49 5.00   < > 4.63*   < > 6.45 5.13   < > 4.78* 5.40 4.81   < > 4.33*  --  5.20 5.63   RDW % 25.2* 24.1* 23.0* 21.2* 20.7* 21.1* 20.9*   < > 21.5* 23.7* 24.4*   < > 24.5*   < > 22.0*   < > 22.4* 22.5*   < > 24.1*   < > 24.5* 24.8*   < > 24.1* 24.2* 24.5*   < > 25.1*  --  24.9* 25.0*   MPV fL 13.9*  --  13.4* 13.4* 13.6* 13.4* 12.7*   < > 13.0* 12.8* 13.9*   < >  --   --   --    < > 13.9* 13.4*   < > 12.5*   < >  --   --   --   --   --   --   --   --   --   --  13.7*   PLATELETS 10*3/mm3 122* 106* 71* 76* 71* 89* 86*   < > 128* 161 107*   < > 95*   < > 89*   < > 117* 119*   < > 136   < > 96* 78*   < > 83* 74* 83*   < > 102*  --  85* 97*   IMM GRAN % %  --   --   --   --   --   --   --   --  0.8* 1.1*  --   --  0.5  --  0.7*  --  0.9  --   --  0.6   < >  --   --    < >  --   --   --   --   --   --   --   --    NEUTROS ABS 10*3/mm3 3.80 11.82* 3.44 2.91  2.99 2.86 3.42   < > 4.98 5.52 4.99   < > 3.78   < > 3.71   < > 3.31 3.50  --  2.81   < > 4.89 2.54   < > 3.37 3.44 3.51   < > 2.42  --  3.07 3.43   LYMPHS ABS 10*3/mm3 1.02 1.27 0.99 0.76 0.58* 0.99 0.97   < > 0.66* 1.10  --    < > 1.68   < > 1.08   < > 1.34  --   --  1.06   < >  --   --    < >  --   --   --    < >  --   --   --  1.57   MONOS ABS 10*3/mm3 0.52 0.79 0.47 0.37 0.37 0.51 0.54   < > 0.48 0.55  --    < > 0.56   < > 0.50   < > 0.64  --   --  0.58   < >  --   --    < >  --   --   --    < >  --   --   --  0.44   EOS ABS 10*3/mm3 0.15 0.21 0.08 0.05 0.08 0.06 0.05   < > 0.05 0.09  --    < > 0.16   < > 0.15   < > 0.12  --   --  0.12   < > 0.13 0.26   < > 0.10 0.16 0.05   < > 0.22  --  0.16 0.13   BASOS ABS 10*3/mm3 0.03 0.03 0.02 0.01 0.02 0.02 0.02   < > 0.03 0.03  --    < > 0.03   < > 0.03   < > 0.03  --   --  0.03   < >  --   --    < >  --  0.05  --    < > 0.04   < >  --  0.03   IMMATURE GRANS (ABS) 10*3/mm3  --   --   --   --   --   --   --   --  0.05 0.08*  --   --  0.03  --  0.04  --  0.05  --   --  0.03   < >  --   --    < >  --   --   --   --   --   --   --   --    NRBC /100 WBC  --   --   --   --   --   --   --   --  0.0 0.0 2.0*  --  0.5*  --  0.4*  --  0.5* 1.0*  --  0.4*   < >  --   --    < >  --  1.0* 1.0*  --  2.0*  --  0.6*  --    NEUTROPHIL % %  --   --   --   --   --   --   --   --   --   --  76.0  --   --   --   --   --   --  65.0  --   --   --  67.7 45.5  --  70.4 61.6 73.0  --  53.0  --  58.0 69.0   MONOCYTES % %  --   --   --   --   --   --   --   --   --   --  7.0  --   --   --   --   --   --  11.0  --   --   --  3.0* 8.1  --  5.1 6.1 4.0  --  3.0*  --  8.0 4.0   BASOPHIL % %  --   --   --   --   --   --   --   --   --   --   --   --   --   --   --   --   --   --   --   --   --   --   --   --   --  1.0  --   --  1.0  --   --   --    ATYP LYMPH % %  --   --   --   --   --   --   --   --   --   --   --   --   --   --   --   --   --   --   --   --   --   --   --   --  2.0 3.0 1.0  --  5.0   --  7.0* 1.0   ANISOCYTOSIS   --   --   --   --   --   --   --   --   --   --  Mod/2+  --   --   --   --   --   --  Slight/1+  --   --   --  Large/3+ Large/3+  --  Large/3+ Large/3+ Mod/2+  --  Large/3+  --  Mod/2+ Slight/1+   GIANT PLT   --   --   --   --   --   --   --   --   --   --  Slight/1+  --   --   --   --   --   --  Large/3+  --   --   --  Mod/2+ Large/3+  --  Slight/1+ Slight/1+  --   --  Large/3+  --   --   --     < > = values in this interval not displayed.       Lab Results - Last 18 Months   Lab Units 01/03/20  1135 12/10/19  0437 12/09/19  0424 12/08/19  1715 12/06/19  1202 11/08/19  1159 10/31/19  1336 10/04/19  1147 09/06/19  1200   GLUCOSE mg/dL 97 130* 97 113* 112* 112* 104 86 107*   SODIUM mmol/L 132* 136 135* 134* 133* 135* 137 138 138   POTASSIUM mmol/L 4.0 3.9 4.2 4.0 3.9 4.0 4.1 4.0 4.2   TOTAL CO2 mmol/L  --   --   --   --   --   --  21*  --   --    CO2 mmol/L 26.0 29.0 27.0 29.0 30.0* 28.0  --  26.0 29.0   CHLORIDE mmol/L 96* 101 98 96* 94* 99 100 100 100   ANION GAP mmol/L 10.0 6.0 10.0 9.0 9.0 8.0 16 12.0 9.0   CREATININE mg/dL 0.97 0.89 1.00 0.89 0.94 0.97 1.1 0.99 1.10   BUN mg/dL 19 12 14 15 17 17 18 19 22*   BUN / CREAT RATIO  19.6 13.5 14.0 16.9 18.1 17.5  --  19.2 20.0   CALCIUM mg/dL 9.0 8.5* 8.2* 8.9 8.7 8.8 9.0 9.4 9.1   EGFR IF NONAFRICN AM mL/min/1.73 74 82 72 82 77 74 >60 73 64   ALK PHOS U/L 240*  --   --  210* 227* 305* 311* 106 189*   TOTAL PROTEIN g/dL 7.5  --   --  7.2 7.4 7.1 7.3 8.0 8.0   ALT (SGPT) U/L 43*  --   --  40 40 58* 78* 19 30   AST (SGOT) U/L 33  --   --  36 43* 45* 67* 28 53*   BILIRUBIN mg/dL 0.7  --   --  0.8 1.2 1.2 1.1 0.9 1.6*   ALBUMIN g/dL 3.00*  --   --  2.90* 3.10* 3.00* 3.1* 3.90 3.90   GLOBULIN gm/dL 4.5  --   --  4.3 4.3 4.1  --  4.1 4.1       No results for input(s): MSPIKE, KAPPALAMB, IGLFLC, URICACID, FREEKAPPAL, CEA, LDH, REFLABREPO in the last 42106 hours.    Lab Results - Last 18 Months   Lab Units 12/27/19  1153 12/08/19  0214  "12/06/19  1202 11/08/19  1159 10/31/19  1336 10/04/19  1147 09/06/19  1200 08/23/19  1211  07/25/19  2153  03/05/19  1420  08/22/18  1210   IRON mcg/dL 24*  --  39* 37* 33* 76  76 53 108  93   < > 56   < >  --    < >  --    TIBC mcg/dL 194*  --  149* 161*  --  244* 215* 219*  215*   < > 186*   < >  --    < >  --    IRON SATURATION % 12*  --  26 23  --  31 25 49*  43   < > 30   < >  --    < >  --    FERRITIN ng/mL 3,493.00*  --  3,611.00* 3,389.00*  --  2,552.00* 2,080.00* 2,050.00*   < >  --    < >  --    < >  --    TSH uIU/mL  --  0.194*  --   --  10.780*  --   --   --   --   --   --  1.740  --  0.237*   FOLATE ng/mL  --   --   --   --   --   --   --   --   --  10.00  --   --   --   --     < > = values in this interval not displayed.         Miguel Asif reports a pain score of 0.      Patient's Body mass index is 22.08 kg/m². BMI is within normal parameters. No follow-up required..      ASSESSMENT:  1.    Anemia, macrocytic, from myelodysplasia (refractory anemia with ringed sideroblasts), iron deficiency, and B12 deficiency.   2.    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   3.    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   Followed at [facility].   4.    Possible confluent lymphadenopathy in the celiac axis, measuring 1.8 cm transversely and 3.5 cm longitudinally. Followed at [facility].   5.    10 mm nodule in the right adrenal gland   6.    Thrombocytopenia from myelodysplasia.   7.    Weight loss.    8.    Chronic obstructive pulmonary disease.  9.    Coronary artery disease.  10.  Hypertension.   11.  Poor performance status of 3.        PLAN:  1.     Re:  Events from recent hospitalization.  Spouse is upset about not knowing whether patient has mesothelioma or not.  He can't have a biopsy.  He will die.\"   2.     Re:  Heme status.  Hemoglobin 9.8 gm, ANC 3.8, and platelet 122.   3.     Re:  Pre-office CMP.  ALT 43 and alkaline phosphatase 240.  CMP was faxed to PCP " 01/03/2020.  4.     Re:  Pre-office ferritin, TIBC, % saturation and iron. 12% saturation and ferritin 3,493 ng/ml.   Hold iron replacement, ferritin above 1,000 ng/ml.  Not a candidate for iron chelation due to poor overall prognosis.   5.     Re:  Continue Procrit.      6.     Re:  Myelodysplastic syndrome and role of  Procrit. No hypo methylating agents due to poor overall prognosis.  7.    Procrit 40,000 units subcutaneously weekly if hemoglobin less than 12 and hematocrit less than 36% (myelodysplasia).  Observe for adverse reactions especially hypertension.    8.    CBC with differential every week.  9.    Ferritin, TIBC, % saturation, and iron every 4 weeks.  10.  Continue currently identified medications.  11.  Continue ongoing management per primary care physician and other specialists.  12.  Plan of care discussed with patient and spouse.  Understanding expressed.  Patient agreeable to proceed.  13.  Transfuse 2 units PRBCs if hemoglobin below 8.  Premed Tylenol 500 mg p.o. and Benadryl 25 mg IV.  Lasix 20 mg IVP after each unit of PRBC.  14.  Return to the office in 3 months with pre-office CMP.   15.  For the use of ESAs:   the patient about the appropriate use of ESAs for patients with cancer, MDS, and CKD.  Acknowledge that the MADELAINE risk: benefit discussion occurred using the [facility] Patient and Healthcare Professional (HCP) Acknowledgement Form.  Assure signature on the form.  Acknowledge government oversight and intervention in the care of the individual patient.        TIME SPENT:  Face-to-face time on this encounter, as defined by the American Medical Association in the 2020 Current Procedural Terminology codebook; assessment, record review, lab review, planning and education is 35 minutes.           cc: MD Demetrius Trejo MD Melissa Warren, MD [facility] Green, APRN

## 2020-01-09 ENCOUNTER — OFFICE VISIT (OUTPATIENT)
Dept: ONCOLOGY | Facility: CLINIC | Age: 80
End: 2020-01-09

## 2020-01-09 VITALS
TEMPERATURE: 97.7 F | BODY MASS INDEX: 22.16 KG/M2 | OXYGEN SATURATION: 97 % | HEIGHT: 71 IN | RESPIRATION RATE: 18 BRPM | DIASTOLIC BLOOD PRESSURE: 72 MMHG | WEIGHT: 158.3 LBS | HEART RATE: 96 BPM | SYSTOLIC BLOOD PRESSURE: 144 MMHG

## 2020-01-09 DIAGNOSIS — D46.9 MDS (MYELODYSPLASTIC SYNDROME) (HCC): Primary | ICD-10-CM

## 2020-01-09 PROCEDURE — 99214 OFFICE O/P EST MOD 30 MIN: CPT | Performed by: INTERNAL MEDICINE

## 2020-01-10 ENCOUNTER — LAB (OUTPATIENT)
Dept: LAB | Facility: HOSPITAL | Age: 80
End: 2020-01-10

## 2020-01-10 ENCOUNTER — INFUSION (OUTPATIENT)
Dept: ONCOLOGY | Facility: HOSPITAL | Age: 80
End: 2020-01-10

## 2020-01-10 VITALS
BODY MASS INDEX: 21.56 KG/M2 | WEIGHT: 154 LBS | OXYGEN SATURATION: 98 % | HEART RATE: 98 BPM | SYSTOLIC BLOOD PRESSURE: 139 MMHG | DIASTOLIC BLOOD PRESSURE: 71 MMHG | RESPIRATION RATE: 16 BRPM | HEIGHT: 71 IN | TEMPERATURE: 98 F

## 2020-01-10 DIAGNOSIS — D46.9 MDS (MYELODYSPLASTIC SYNDROME) (HCC): ICD-10-CM

## 2020-01-10 DIAGNOSIS — D46.9 MDS (MYELODYSPLASTIC SYNDROME) (HCC): Primary | ICD-10-CM

## 2020-01-10 LAB
ALBUMIN SERPL-MCNC: 3.4 G/DL (ref 3.5–5.2)
ALBUMIN/GLOB SERPL: 0.8 G/DL
ALP SERPL-CCNC: 243 U/L (ref 39–117)
ALT SERPL W P-5'-P-CCNC: 25 U/L (ref 1–41)
ANION GAP SERPL CALCULATED.3IONS-SCNC: 11 MMOL/L (ref 5–15)
AST SERPL-CCNC: 21 U/L (ref 1–40)
BASOPHILS # BLD AUTO: 0.03 10*3/MM3 (ref 0–0.2)
BASOPHILS NFR BLD AUTO: 0.4 % (ref 0–1.5)
BILIRUB SERPL-MCNC: 0.8 MG/DL (ref 0.2–1.2)
BUN BLD-MCNC: 18 MG/DL (ref 8–23)
BUN/CREAT SERPL: 16.8 (ref 7–25)
CALCIUM SPEC-SCNC: 9.5 MG/DL (ref 8.6–10.5)
CHLORIDE SERPL-SCNC: 98 MMOL/L (ref 98–107)
CO2 SERPL-SCNC: 28 MMOL/L (ref 22–29)
CREAT BLD-MCNC: 1.07 MG/DL (ref 0.76–1.27)
DEPRECATED RDW RBC AUTO: 87.4 FL (ref 37–54)
EOSINOPHIL # BLD AUTO: 0.11 10*3/MM3 (ref 0–0.4)
EOSINOPHIL NFR BLD AUTO: 1.4 % (ref 0.3–6.2)
ERYTHROCYTE [DISTWIDTH] IN BLOOD BY AUTOMATED COUNT: 26.5 % (ref 12.3–15.4)
FERRITIN SERPL-MCNC: 3605 NG/ML (ref 30–400)
GFR SERPL CREATININE-BSD FRML MDRD: 66 ML/MIN/1.73
GLOBULIN UR ELPH-MCNC: 4.5 GM/DL
GLUCOSE BLD-MCNC: 117 MG/DL (ref 65–99)
HCT VFR BLD AUTO: 30.7 % (ref 37.5–51)
HGB BLD-MCNC: 9.7 G/DL (ref 13–17.7)
IMM GRANULOCYTES # BLD AUTO: 0.15 10*3/MM3 (ref 0–0.05)
IMM GRANULOCYTES NFR BLD AUTO: 2 % (ref 0–0.5)
IRON 24H UR-MRATE: 142 MCG/DL (ref 59–158)
IRON SATN MFR SERPL: 52 % (ref 20–50)
LYMPHOCYTES # BLD AUTO: 1.38 10*3/MM3 (ref 0.7–3.1)
LYMPHOCYTES NFR BLD AUTO: 18.1 % (ref 19.6–45.3)
MCH RBC QN AUTO: 30.5 PG (ref 26.6–33)
MCHC RBC AUTO-ENTMCNC: 31.6 G/DL (ref 31.5–35.7)
MCV RBC AUTO: 96.5 FL (ref 79–97)
MONOCYTES # BLD AUTO: 0.76 10*3/MM3 (ref 0.1–0.9)
MONOCYTES NFR BLD AUTO: 10 % (ref 5–12)
NEUTROPHILS # BLD AUTO: 5.19 10*3/MM3 (ref 1.7–7)
NEUTROPHILS NFR BLD AUTO: 68.1 % (ref 42.7–76)
NRBC BLD AUTO-RTO: 0.7 /100 WBC (ref 0–0.2)
PLATELET # BLD AUTO: 132 10*3/MM3 (ref 140–450)
PMV BLD AUTO: 11.7 FL (ref 6–12)
POTASSIUM BLD-SCNC: 4 MMOL/L (ref 3.5–5.2)
PROT SERPL-MCNC: 7.9 G/DL (ref 6–8.5)
RBC # BLD AUTO: 3.18 10*6/MM3 (ref 4.14–5.8)
SODIUM BLD-SCNC: 137 MMOL/L (ref 136–145)
TIBC SERPL-MCNC: 271 MCG/DL (ref 298–536)
TRANSFERRIN SERPL-MCNC: 182 MG/DL (ref 200–360)
WBC NRBC COR # BLD: 7.62 10*3/MM3 (ref 3.4–10.8)

## 2020-01-10 PROCEDURE — 85025 COMPLETE CBC W/AUTO DIFF WBC: CPT

## 2020-01-10 PROCEDURE — 84466 ASSAY OF TRANSFERRIN: CPT

## 2020-01-10 PROCEDURE — 80053 COMPREHEN METABOLIC PANEL: CPT

## 2020-01-10 PROCEDURE — 36415 COLL VENOUS BLD VENIPUNCTURE: CPT

## 2020-01-10 PROCEDURE — 96372 THER/PROPH/DIAG INJ SC/IM: CPT

## 2020-01-10 PROCEDURE — 83540 ASSAY OF IRON: CPT

## 2020-01-10 PROCEDURE — 82728 ASSAY OF FERRITIN: CPT

## 2020-01-10 PROCEDURE — 25010000002 EPOETIN ALFA PER 1000 UNITS: Performed by: INTERNAL MEDICINE

## 2020-01-10 RX ADMIN — ERYTHROPOIETIN 40000 UNITS: 40000 INJECTION, SOLUTION INTRAVENOUS; SUBCUTANEOUS at 12:13

## 2020-01-17 ENCOUNTER — LAB (OUTPATIENT)
Dept: LAB | Facility: HOSPITAL | Age: 80
End: 2020-01-17

## 2020-01-17 ENCOUNTER — INFUSION (OUTPATIENT)
Dept: ONCOLOGY | Facility: HOSPITAL | Age: 80
End: 2020-01-17

## 2020-01-17 VITALS
OXYGEN SATURATION: 99 % | HEART RATE: 95 BPM | WEIGHT: 161 LBS | HEIGHT: 71 IN | DIASTOLIC BLOOD PRESSURE: 73 MMHG | TEMPERATURE: 97.6 F | RESPIRATION RATE: 17 BRPM | BODY MASS INDEX: 22.54 KG/M2 | SYSTOLIC BLOOD PRESSURE: 134 MMHG

## 2020-01-17 DIAGNOSIS — D46.9 MDS (MYELODYSPLASTIC SYNDROME) (HCC): Primary | ICD-10-CM

## 2020-01-17 LAB
BASOPHILS # BLD AUTO: 0.03 10*3/MM3 (ref 0–0.2)
BASOPHILS NFR BLD AUTO: 0.5 % (ref 0–1.5)
DEPRECATED RDW RBC AUTO: 86.2 FL (ref 37–54)
EOSINOPHIL # BLD AUTO: 0.14 10*3/MM3 (ref 0–0.4)
EOSINOPHIL NFR BLD AUTO: 2.3 % (ref 0.3–6.2)
ERYTHROCYTE [DISTWIDTH] IN BLOOD BY AUTOMATED COUNT: 26.8 % (ref 12.3–15.4)
HCT VFR BLD AUTO: 26.7 % (ref 37.5–51)
HGB BLD-MCNC: 8.5 G/DL (ref 13–17.7)
HOLD SPECIMEN: NORMAL
LYMPHOCYTES # BLD AUTO: 1.66 10*3/MM3 (ref 0.7–3.1)
LYMPHOCYTES NFR BLD AUTO: 27.6 % (ref 19.6–45.3)
MCH RBC QN AUTO: 30.8 PG (ref 26.6–33)
MCHC RBC AUTO-ENTMCNC: 31.8 G/DL (ref 31.5–35.7)
MCV RBC AUTO: 96.7 FL (ref 79–97)
MONOCYTES # BLD AUTO: 0.62 10*3/MM3 (ref 0.1–0.9)
MONOCYTES NFR BLD AUTO: 10.3 % (ref 5–12)
NEUTROPHILS # BLD AUTO: 3.47 10*3/MM3 (ref 1.7–7)
NEUTROPHILS NFR BLD AUTO: 57.6 % (ref 42.7–76)
PLATELET # BLD AUTO: 125 10*3/MM3 (ref 140–450)
PMV BLD AUTO: 13.2 FL (ref 6–12)
RBC # BLD AUTO: 2.76 10*6/MM3 (ref 4.14–5.8)
WBC NRBC COR # BLD: 6.02 10*3/MM3 (ref 3.4–10.8)

## 2020-01-17 PROCEDURE — 85025 COMPLETE CBC W/AUTO DIFF WBC: CPT

## 2020-01-17 PROCEDURE — 36415 COLL VENOUS BLD VENIPUNCTURE: CPT

## 2020-01-17 PROCEDURE — 25010000002 EPOETIN ALFA PER 1000 UNITS: Performed by: INTERNAL MEDICINE

## 2020-01-17 PROCEDURE — 96372 THER/PROPH/DIAG INJ SC/IM: CPT

## 2020-01-17 RX ADMIN — ERYTHROPOIETIN 40000 UNITS: 40000 INJECTION, SOLUTION INTRAVENOUS; SUBCUTANEOUS at 12:20

## 2020-01-24 ENCOUNTER — TELEPHONE (OUTPATIENT)
Dept: ONCOLOGY | Facility: CLINIC | Age: 80
End: 2020-01-24

## 2020-01-24 ENCOUNTER — INFUSION (OUTPATIENT)
Dept: ONCOLOGY | Facility: HOSPITAL | Age: 80
End: 2020-01-24

## 2020-01-24 ENCOUNTER — LAB (OUTPATIENT)
Dept: LAB | Facility: HOSPITAL | Age: 80
End: 2020-01-24

## 2020-01-24 VITALS
HEIGHT: 71 IN | DIASTOLIC BLOOD PRESSURE: 75 MMHG | SYSTOLIC BLOOD PRESSURE: 145 MMHG | TEMPERATURE: 98.1 F | HEART RATE: 80 BPM | RESPIRATION RATE: 18 BRPM | OXYGEN SATURATION: 100 % | WEIGHT: 159 LBS | BODY MASS INDEX: 22.26 KG/M2

## 2020-01-24 DIAGNOSIS — D46.9 MDS (MYELODYSPLASTIC SYNDROME) (HCC): Primary | ICD-10-CM

## 2020-01-24 LAB
ABO GROUP BLD: NORMAL
BASOPHILS # BLD AUTO: 0.02 10*3/MM3 (ref 0–0.2)
BASOPHILS NFR BLD AUTO: 0.4 % (ref 0–1.5)
BLD GP AB SCN SERPL QL: NEGATIVE
DEPRECATED RDW RBC AUTO: 93.9 FL (ref 37–54)
EOSINOPHIL # BLD AUTO: 0.1 10*3/MM3 (ref 0–0.4)
EOSINOPHIL NFR BLD AUTO: 1.8 % (ref 0.3–6.2)
ERYTHROCYTE [DISTWIDTH] IN BLOOD BY AUTOMATED COUNT: 27.9 % (ref 12.3–15.4)
HCT VFR BLD AUTO: 23.6 % (ref 37.5–51)
HGB BLD-MCNC: 7.5 G/DL (ref 13–17.7)
HOLD SPECIMEN: NORMAL
IMM GRANULOCYTES # BLD AUTO: 0.06 10*3/MM3 (ref 0–0.05)
IMM GRANULOCYTES NFR BLD AUTO: 1.1 % (ref 0–0.5)
LYMPHOCYTES # BLD AUTO: 1.01 10*3/MM3 (ref 0.7–3.1)
LYMPHOCYTES NFR BLD AUTO: 17.9 % (ref 19.6–45.3)
MCH RBC QN AUTO: 31 PG (ref 26.6–33)
MCHC RBC AUTO-ENTMCNC: 31.8 G/DL (ref 31.5–35.7)
MCV RBC AUTO: 97.5 FL (ref 79–97)
MONOCYTES # BLD AUTO: 0.55 10*3/MM3 (ref 0.1–0.9)
MONOCYTES NFR BLD AUTO: 9.8 % (ref 5–12)
NEUTROPHILS # BLD AUTO: 3.89 10*3/MM3 (ref 1.7–7)
NEUTROPHILS NFR BLD AUTO: 69 % (ref 42.7–76)
NRBC BLD AUTO-RTO: 1.2 /100 WBC (ref 0–0.2)
PLATELET # BLD AUTO: 96 10*3/MM3 (ref 140–450)
PMV BLD AUTO: 12.2 FL (ref 6–12)
RBC # BLD AUTO: 2.42 10*6/MM3 (ref 4.14–5.8)
RH BLD: POSITIVE
T&S EXPIRATION DATE: NORMAL
WBC NRBC COR # BLD: 5.63 10*3/MM3 (ref 3.4–10.8)

## 2020-01-24 PROCEDURE — 96372 THER/PROPH/DIAG INJ SC/IM: CPT

## 2020-01-24 PROCEDURE — 86923 COMPATIBILITY TEST ELECTRIC: CPT

## 2020-01-24 PROCEDURE — 96374 THER/PROPH/DIAG INJ IV PUSH: CPT

## 2020-01-24 PROCEDURE — 86901 BLOOD TYPING SEROLOGIC RH(D): CPT

## 2020-01-24 PROCEDURE — 36415 COLL VENOUS BLD VENIPUNCTURE: CPT

## 2020-01-24 PROCEDURE — 96365 THER/PROPH/DIAG IV INF INIT: CPT

## 2020-01-24 PROCEDURE — 86850 RBC ANTIBODY SCREEN: CPT

## 2020-01-24 PROCEDURE — 36430 TRANSFUSION BLD/BLD COMPNT: CPT

## 2020-01-24 PROCEDURE — 25010000002 DIPHENHYDRAMINE PER 50 MG: Performed by: INTERNAL MEDICINE

## 2020-01-24 PROCEDURE — 86900 BLOOD TYPING SEROLOGIC ABO: CPT

## 2020-01-24 PROCEDURE — 85025 COMPLETE CBC W/AUTO DIFF WBC: CPT

## 2020-01-24 PROCEDURE — 25010000002 EPOETIN ALFA-EPBX 40000 UNIT/ML SOLUTION: Performed by: INTERNAL MEDICINE

## 2020-01-24 PROCEDURE — P9016 RBC LEUKOCYTES REDUCED: HCPCS

## 2020-01-24 RX ORDER — ACETAMINOPHEN 325 MG/1
650 TABLET ORAL ONCE
Status: COMPLETED | OUTPATIENT
Start: 2020-01-24 | End: 2020-01-24

## 2020-01-24 RX ORDER — FUROSEMIDE 10 MG/ML
20 INJECTION INTRAMUSCULAR; INTRAVENOUS ONCE
Status: DISCONTINUED | OUTPATIENT
Start: 2020-01-24 | End: 2020-09-16 | Stop reason: HOSPADM

## 2020-01-24 RX ORDER — DIPHENHYDRAMINE HYDROCHLORIDE 50 MG/ML
25 INJECTION INTRAMUSCULAR; INTRAVENOUS ONCE
Status: COMPLETED | OUTPATIENT
Start: 2020-01-24 | End: 2020-01-24

## 2020-01-24 RX ORDER — SODIUM CHLORIDE 9 MG/ML
250 INJECTION, SOLUTION INTRAVENOUS AS NEEDED
Status: DISCONTINUED | OUTPATIENT
Start: 2020-01-24 | End: 2020-09-16 | Stop reason: HOSPADM

## 2020-01-24 RX ADMIN — SODIUM CHLORIDE 250 ML: 9 INJECTION, SOLUTION INTRAVENOUS at 12:47

## 2020-01-24 RX ADMIN — EPOETIN ALFA-EPBX 40000 UNITS: 40000 INJECTION, SOLUTION INTRAVENOUS; SUBCUTANEOUS at 12:51

## 2020-01-24 RX ADMIN — DIPHENHYDRAMINE HYDROCHLORIDE 25 MG: 50 INJECTION, SOLUTION INTRAMUSCULAR; INTRAVENOUS at 12:46

## 2020-01-24 RX ADMIN — ACETAMINOPHEN 650 MG: 325 TABLET, FILM COATED ORAL at 12:46

## 2020-01-24 NOTE — TELEPHONE ENCOUNTER
Call from Madny ZHANG to reports a low HGB of 7.5. Discussed with Dr. Martinez. Patient is to have 2 units PRBCs. Per Mandy only one unit can be done today so one unit was scheduled for Monday at 10:45 at the [facility].

## 2020-01-27 ENCOUNTER — INFUSION (OUTPATIENT)
Dept: ONCOLOGY | Facility: HOSPITAL | Age: 80
End: 2020-01-27

## 2020-01-27 VITALS
OXYGEN SATURATION: 100 % | DIASTOLIC BLOOD PRESSURE: 74 MMHG | HEART RATE: 79 BPM | TEMPERATURE: 97.6 F | SYSTOLIC BLOOD PRESSURE: 147 MMHG | RESPIRATION RATE: 18 BRPM

## 2020-01-27 DIAGNOSIS — D46.9 MDS (MYELODYSPLASTIC SYNDROME) (HCC): ICD-10-CM

## 2020-01-27 DIAGNOSIS — D46.9 MDS (MYELODYSPLASTIC SYNDROME) (HCC): Primary | ICD-10-CM

## 2020-01-27 PROCEDURE — 86923 COMPATIBILITY TEST ELECTRIC: CPT

## 2020-01-27 PROCEDURE — 86900 BLOOD TYPING SEROLOGIC ABO: CPT

## 2020-01-27 PROCEDURE — 36430 TRANSFUSION BLD/BLD COMPNT: CPT

## 2020-01-27 PROCEDURE — P9016 RBC LEUKOCYTES REDUCED: HCPCS

## 2020-01-27 PROCEDURE — 25010000002 DIPHENHYDRAMINE PER 50 MG: Performed by: INTERNAL MEDICINE

## 2020-01-27 PROCEDURE — 96374 THER/PROPH/DIAG INJ IV PUSH: CPT

## 2020-01-27 RX ORDER — DIPHENHYDRAMINE HYDROCHLORIDE 50 MG/ML
25 INJECTION INTRAMUSCULAR; INTRAVENOUS ONCE
Status: COMPLETED | OUTPATIENT
Start: 2020-01-27 | End: 2020-01-27

## 2020-01-27 RX ORDER — SODIUM CHLORIDE 9 MG/ML
250 INJECTION, SOLUTION INTRAVENOUS AS NEEDED
Status: DISCONTINUED | OUTPATIENT
Start: 2020-01-27 | End: 2020-01-27 | Stop reason: HOSPADM

## 2020-01-27 RX ORDER — FUROSEMIDE 10 MG/ML
20 INJECTION INTRAMUSCULAR; INTRAVENOUS ONCE
Status: CANCELLED | OUTPATIENT
Start: 2020-01-27 | End: 2020-01-27

## 2020-01-27 RX ORDER — ACETAMINOPHEN 325 MG/1
650 TABLET ORAL ONCE
Status: CANCELLED | OUTPATIENT
Start: 2020-01-27 | End: 2020-01-27

## 2020-01-27 RX ORDER — FUROSEMIDE 10 MG/ML
20 INJECTION INTRAMUSCULAR; INTRAVENOUS ONCE
Status: DISCONTINUED | OUTPATIENT
Start: 2020-01-27 | End: 2020-01-27 | Stop reason: HOSPADM

## 2020-01-27 RX ORDER — DIPHENHYDRAMINE HYDROCHLORIDE 50 MG/ML
25 INJECTION INTRAMUSCULAR; INTRAVENOUS ONCE
Status: CANCELLED | OUTPATIENT
Start: 2020-01-27 | End: 2020-01-27

## 2020-01-27 RX ORDER — SODIUM CHLORIDE 9 MG/ML
250 INJECTION, SOLUTION INTRAVENOUS AS NEEDED
Status: CANCELLED | OUTPATIENT
Start: 2020-01-27

## 2020-01-27 RX ORDER — ACETAMINOPHEN 325 MG/1
650 TABLET ORAL ONCE
Status: COMPLETED | OUTPATIENT
Start: 2020-01-27 | End: 2020-01-27

## 2020-01-27 RX ADMIN — ACETAMINOPHEN 650 MG: 325 TABLET, FILM COATED ORAL at 12:04

## 2020-01-27 RX ADMIN — DIPHENHYDRAMINE HYDROCHLORIDE 25 MG: 50 INJECTION, SOLUTION INTRAMUSCULAR; INTRAVENOUS at 12:05

## 2020-01-27 RX ADMIN — SODIUM CHLORIDE 250 ML: 9 INJECTION, SOLUTION INTRAVENOUS at 12:05

## 2020-01-31 ENCOUNTER — LAB (OUTPATIENT)
Dept: LAB | Facility: HOSPITAL | Age: 80
End: 2020-01-31

## 2020-01-31 ENCOUNTER — INFUSION (OUTPATIENT)
Dept: ONCOLOGY | Facility: HOSPITAL | Age: 80
End: 2020-01-31

## 2020-01-31 VITALS
OXYGEN SATURATION: 99 % | TEMPERATURE: 97.6 F | HEIGHT: 71 IN | RESPIRATION RATE: 18 BRPM | DIASTOLIC BLOOD PRESSURE: 65 MMHG | HEART RATE: 93 BPM | WEIGHT: 157 LBS | BODY MASS INDEX: 21.98 KG/M2 | SYSTOLIC BLOOD PRESSURE: 110 MMHG

## 2020-01-31 DIAGNOSIS — D46.9 MDS (MYELODYSPLASTIC SYNDROME) (HCC): Primary | ICD-10-CM

## 2020-01-31 LAB
BASOPHILS # BLD AUTO: 0.02 10*3/MM3 (ref 0–0.2)
BASOPHILS NFR BLD AUTO: 0.3 % (ref 0–1.5)
DEPRECATED RDW RBC AUTO: 81.6 FL (ref 37–54)
EOSINOPHIL # BLD AUTO: 0.09 10*3/MM3 (ref 0–0.4)
EOSINOPHIL NFR BLD AUTO: 1.5 % (ref 0.3–6.2)
ERYTHROCYTE [DISTWIDTH] IN BLOOD BY AUTOMATED COUNT: 25.1 % (ref 12.3–15.4)
HCT VFR BLD AUTO: 29.1 % (ref 37.5–51)
HGB BLD-MCNC: 9.5 G/DL (ref 13–17.7)
HOLD SPECIMEN: NORMAL
HOLD SPECIMEN: NORMAL
IMM GRANULOCYTES # BLD AUTO: 0.08 10*3/MM3 (ref 0–0.05)
IMM GRANULOCYTES NFR BLD AUTO: 1.3 % (ref 0–0.5)
LYMPHOCYTES # BLD AUTO: 1.16 10*3/MM3 (ref 0.7–3.1)
LYMPHOCYTES NFR BLD AUTO: 19.3 % (ref 19.6–45.3)
MCH RBC QN AUTO: 31.3 PG (ref 26.6–33)
MCHC RBC AUTO-ENTMCNC: 32.6 G/DL (ref 31.5–35.7)
MCV RBC AUTO: 95.7 FL (ref 79–97)
MONOCYTES # BLD AUTO: 0.6 10*3/MM3 (ref 0.1–0.9)
MONOCYTES NFR BLD AUTO: 10 % (ref 5–12)
NEUTROPHILS # BLD AUTO: 4.05 10*3/MM3 (ref 1.7–7)
NEUTROPHILS NFR BLD AUTO: 67.6 % (ref 42.7–76)
NRBC BLD AUTO-RTO: 1.8 /100 WBC (ref 0–0.2)
PLATELET # BLD AUTO: 108 10*3/MM3 (ref 140–450)
PMV BLD AUTO: 13.6 FL (ref 6–12)
RBC # BLD AUTO: 3.04 10*6/MM3 (ref 4.14–5.8)
WBC NRBC COR # BLD: 6 10*3/MM3 (ref 3.4–10.8)

## 2020-01-31 PROCEDURE — 25010000002 EPOETIN ALFA PER 1000 UNITS: Performed by: INTERNAL MEDICINE

## 2020-01-31 PROCEDURE — 85025 COMPLETE CBC W/AUTO DIFF WBC: CPT

## 2020-01-31 PROCEDURE — 96372 THER/PROPH/DIAG INJ SC/IM: CPT

## 2020-01-31 PROCEDURE — 36415 COLL VENOUS BLD VENIPUNCTURE: CPT

## 2020-01-31 RX ADMIN — ERYTHROPOIETIN 40000 UNITS: 40000 INJECTION, SOLUTION INTRAVENOUS; SUBCUTANEOUS at 12:43

## 2020-02-07 ENCOUNTER — INFUSION (OUTPATIENT)
Dept: ONCOLOGY | Facility: HOSPITAL | Age: 80
End: 2020-02-07

## 2020-02-07 ENCOUNTER — LAB (OUTPATIENT)
Dept: LAB | Facility: HOSPITAL | Age: 80
End: 2020-02-07

## 2020-02-07 VITALS
WEIGHT: 154 LBS | HEART RATE: 96 BPM | DIASTOLIC BLOOD PRESSURE: 65 MMHG | TEMPERATURE: 97.9 F | HEIGHT: 71 IN | BODY MASS INDEX: 21.56 KG/M2 | OXYGEN SATURATION: 96 % | RESPIRATION RATE: 16 BRPM | SYSTOLIC BLOOD PRESSURE: 119 MMHG

## 2020-02-07 DIAGNOSIS — D50.9 IRON DEFICIENCY ANEMIA, UNSPECIFIED IRON DEFICIENCY ANEMIA TYPE: ICD-10-CM

## 2020-02-07 DIAGNOSIS — D46.9 MDS (MYELODYSPLASTIC SYNDROME) (HCC): Primary | ICD-10-CM

## 2020-02-07 DIAGNOSIS — D64.9 ANEMIA, UNSPECIFIED TYPE: ICD-10-CM

## 2020-02-07 DIAGNOSIS — D46.9 MYELODYSPLASTIC SYNDROME (HCC): ICD-10-CM

## 2020-02-07 DIAGNOSIS — D46.9 MDS (MYELODYSPLASTIC SYNDROME) (HCC): ICD-10-CM

## 2020-02-07 LAB
ALBUMIN SERPL-MCNC: 3.5 G/DL (ref 3.5–5.2)
ALBUMIN/GLOB SERPL: 0.8 G/DL
ALP SERPL-CCNC: 186 U/L (ref 39–117)
ALT SERPL W P-5'-P-CCNC: 25 U/L (ref 1–41)
ANION GAP SERPL CALCULATED.3IONS-SCNC: 11 MMOL/L (ref 5–15)
AST SERPL-CCNC: 24 U/L (ref 1–40)
BASOPHILS # BLD AUTO: 0.02 10*3/MM3 (ref 0–0.2)
BASOPHILS NFR BLD AUTO: 0.3 % (ref 0–1.5)
BILIRUB SERPL-MCNC: 1 MG/DL (ref 0.2–1.2)
BUN BLD-MCNC: 19 MG/DL (ref 8–23)
BUN/CREAT SERPL: 18.3 (ref 7–25)
CALCIUM SPEC-SCNC: 9.5 MG/DL (ref 8.6–10.5)
CHLORIDE SERPL-SCNC: 100 MMOL/L (ref 98–107)
CO2 SERPL-SCNC: 25 MMOL/L (ref 22–29)
CREAT BLD-MCNC: 1.04 MG/DL (ref 0.76–1.27)
DEPRECATED RDW RBC AUTO: 85 FL (ref 37–54)
EOSINOPHIL # BLD AUTO: 0.09 10*3/MM3 (ref 0–0.4)
EOSINOPHIL NFR BLD AUTO: 1.4 % (ref 0.3–6.2)
ERYTHROCYTE [DISTWIDTH] IN BLOOD BY AUTOMATED COUNT: 25.9 % (ref 12.3–15.4)
FERRITIN SERPL-MCNC: 3361 NG/ML (ref 30–400)
GFR SERPL CREATININE-BSD FRML MDRD: 69 ML/MIN/1.73
GLOBULIN UR ELPH-MCNC: 4.2 GM/DL
GLUCOSE BLD-MCNC: 122 MG/DL (ref 65–99)
HCT VFR BLD AUTO: 26.4 % (ref 37.5–51)
HGB BLD-MCNC: 8.6 G/DL (ref 13–17.7)
IRON 24H UR-MRATE: 83 MCG/DL (ref 59–158)
IRON SATN MFR SERPL: 36 % (ref 20–50)
LYMPHOCYTES # BLD AUTO: 1.22 10*3/MM3 (ref 0.7–3.1)
LYMPHOCYTES NFR BLD AUTO: 19.6 % (ref 19.6–45.3)
MCH RBC QN AUTO: 31.5 PG (ref 26.6–33)
MCHC RBC AUTO-ENTMCNC: 32.6 G/DL (ref 31.5–35.7)
MCV RBC AUTO: 96.7 FL (ref 79–97)
MONOCYTES # BLD AUTO: 0.64 10*3/MM3 (ref 0.1–0.9)
MONOCYTES NFR BLD AUTO: 10.3 % (ref 5–12)
NEUTROPHILS # BLD AUTO: 4.19 10*3/MM3 (ref 1.7–7)
NEUTROPHILS NFR BLD AUTO: 67.4 % (ref 42.7–76)
PLATELET # BLD AUTO: 124 10*3/MM3 (ref 140–450)
PMV BLD AUTO: 13.6 FL (ref 6–12)
POTASSIUM BLD-SCNC: 3.7 MMOL/L (ref 3.5–5.2)
PROT SERPL-MCNC: 7.7 G/DL (ref 6–8.5)
RBC # BLD AUTO: 2.73 10*6/MM3 (ref 4.14–5.8)
SODIUM BLD-SCNC: 136 MMOL/L (ref 136–145)
TIBC SERPL-MCNC: 229 MCG/DL (ref 298–536)
TRANSFERRIN SERPL-MCNC: 154 MG/DL (ref 200–360)
WBC NRBC COR # BLD: 6.22 10*3/MM3 (ref 3.4–10.8)

## 2020-02-07 PROCEDURE — 82728 ASSAY OF FERRITIN: CPT

## 2020-02-07 PROCEDURE — 96372 THER/PROPH/DIAG INJ SC/IM: CPT

## 2020-02-07 PROCEDURE — 84466 ASSAY OF TRANSFERRIN: CPT

## 2020-02-07 PROCEDURE — 85025 COMPLETE CBC W/AUTO DIFF WBC: CPT

## 2020-02-07 PROCEDURE — 36415 COLL VENOUS BLD VENIPUNCTURE: CPT

## 2020-02-07 PROCEDURE — 80053 COMPREHEN METABOLIC PANEL: CPT

## 2020-02-07 PROCEDURE — 25010000002 EPOETIN ALFA PER 1000 UNITS: Performed by: INTERNAL MEDICINE

## 2020-02-07 PROCEDURE — 83540 ASSAY OF IRON: CPT

## 2020-02-07 RX ADMIN — ERYTHROPOIETIN 40000 UNITS: 40000 INJECTION, SOLUTION INTRAVENOUS; SUBCUTANEOUS at 12:19

## 2020-02-11 NOTE — PROGRESS NOTES
[facility] INTERNAL MEDICINE  [address] [address]  [address] [address]  Dept: [phone]  Dept Fax: [phone]  Loc: [phone]    Brien Redding is a [de-identified] y.o. male who presents today for his medical conditions/complaints as noted below. Brien Redding is c/laura 3 Month Follow-Up        HPI:     HPI   1. Leukemia; Followed by Dr Risa Ross;  He is requiring transfusions frequently; His last hgb is 8 after 2 units   2. Persistent lung mass followed by Dr Risa Ross;  He is really end stage but he wanted to continue to get his procrit injections so he could not go with hospice which we had all encouraged \  3. . Hypoxia he has home O2 but he will not use attacks when he goes out of the house. He really only goes out when he comes here to the office or goes to Dr. Phillips March office. But he does have an appointment in April to go to his pulmonologist in [place]. He will need more than a 8-hour tank. We will ask rotate to supply him a concentrator. 4.  Weak and very fatigued he stays in bed most all the time. He does have to use a walker if even gets up for short distances. He is in a wheelchair if she takes him out.   So therefore they need a light weight wheelchair as well  Chief Complaint   Patient presents with    3 Month Follow-Up       Past Medical History:   Diagnosis Date    CAD (coronary artery disease) 2010    COPD (chronic obstructive pulmonary disease) ([facility] [address] 1990    Hypothyroidism, postop 1974    Myelodysplasia (myelodysplastic syndrome) ([facility] [address] 2014    chr anemia related to B12, iron def, and myelodysplasis - Dr Risa Ross follows     [facility]roid cancer [facility]) 1974    [facility]       Past Surgical History:   Procedure Laterality Date    EYE SURGERY         Vitals 2/11/2020 12/18/2019 11/5/2019 8/5/2019 3/5/2019 2/73/2925   SYSTOLIC 496 036 465 721 418 614   DIASTOLIC 58 80 74 72 68 62   Pulse 102 94 92 84 78 70   Temp - - 97.8 Completed    Pneumococcal 65+ yrs at Risk Vaccine  Completed    Hepatitis A vaccine  Aged Out    Hepatitis B vaccine  Aged Out    Hib vaccine  Aged Out    Meningococcal (ACWY) vaccine  Aged Out       Lab Results   Component Value Date    LABA1C 4.7 04/10/2018     Lab Results   Component Value Date    PSA 0.93 10/31/2019    PSA 0.87 03/05/2019     TSH   Date Value Ref Range Status   10/31/2019 10.780 (H) 0.270 - 4.200 uIU/mL Final   ]  Lab Results   Component Value Date     10/31/2019    K 4.1 10/31/2019     10/31/2019    CO2 21 (L) 10/31/2019    BUN 18 10/31/2019    CREATININE 1.1 10/31/2019    GLUCOSE 104 10/31/2019    CALCIUM 9.0 10/31/2019    PROT 7.3 10/31/2019    LABALBU 3.1 (L) 10/31/2019    BILITOT 1.1 10/31/2019    ALKPHOS 311 (H) 10/31/2019    AST 67 (H) 10/31/2019    ALT 78 (H) 10/31/2019    LABGLOM >60 10/31/2019     Lab Results   Component Value Date    CHOL 111 (L) 10/31/2019    CHOL 144 (L) 03/05/2019    CHOL 133 (L) 04/10/2018     Lab Results   Component Value Date    TRIG 81 10/31/2019    TRIG 66 03/05/2019    TRIG 82 04/10/2018     Lab Results   Component Value Date    HDL 45 (L) 10/31/2019    HDL 77 03/05/2019    HDL 69 04/10/2018     Lab Results   Component Value Date    LDLCALC 50 10/31/2019    LDLCALC 54 03/05/2019    LDLCALC 48 04/10/2018     Lab Results   Component Value Date     10/31/2019    K 4.1 10/31/2019     10/31/2019    CO2 21 10/31/2019    BUN 18 10/31/2019    CREATININE 1.1 10/31/2019    GLUCOSE 104 10/31/2019    CALCIUM 9.0 10/31/2019      Lab Results   Component Value Date    WBC 6.4 10/31/2019    HGB 7.6 (L) 10/31/2019    HCT 25.3 (L) 10/31/2019    .1 (H) 10/31/2019     10/31/2019    LYMPHOPCT 21.3 10/31/2019    RBC 2.18 (L) 10/31/2019    MCH 34.9 (H) 10/31/2019    MCHC 30.0 (L) 10/31/2019    RDW 23.5 (H) 10/31/2019     Lab Results   Component Value Date    VITD25 29.4 (L) 10/31/2019       Subjective:      Review of Systems Chronic pain syndrome we will refill his Lortab prescription today  2. Chronic fatigue persistent and worsening. He will need a light weight wheelchair for any transfers when she has to take him to the doctor's office  3. Anemia due to bone marrow failure is treated by Dr. Valdemar Lizarraga with infusions and Procrit  #4 hypoxia I will certainly need more than a concentrator when they go to [place]. We will send the order over for that. Electronically signed by BHUPENDRA Power on 2/11/2020 at 1:34 PM    EMRDragon/transcription disclaimer:  Much of this encounter note is electronic transcription/translation of spoken language to printed texts. The electronic translation of spoken language may be erroneous, or at times,nonsensical words or phrases may be inadvertently transcribed.   Although I have reviewed the note for such errors, some may still exist.

## 2020-02-14 ENCOUNTER — INFUSION (OUTPATIENT)
Dept: ONCOLOGY | Facility: HOSPITAL | Age: 80
End: 2020-02-14

## 2020-02-14 ENCOUNTER — LAB (OUTPATIENT)
Dept: LAB | Facility: HOSPITAL | Age: 80
End: 2020-02-14

## 2020-02-14 VITALS
WEIGHT: 155 LBS | DIASTOLIC BLOOD PRESSURE: 66 MMHG | TEMPERATURE: 97.8 F | BODY MASS INDEX: 22.19 KG/M2 | SYSTOLIC BLOOD PRESSURE: 127 MMHG | RESPIRATION RATE: 20 BRPM | OXYGEN SATURATION: 99 % | HEIGHT: 70 IN | HEART RATE: 96 BPM

## 2020-02-14 DIAGNOSIS — D46.9 MDS (MYELODYSPLASTIC SYNDROME) (HCC): Primary | ICD-10-CM

## 2020-02-14 LAB
ABO GROUP BLD: NORMAL
BASOPHILS # BLD AUTO: 0.02 10*3/MM3 (ref 0–0.2)
BASOPHILS NFR BLD AUTO: 0.3 % (ref 0–1.5)
BLD GP AB SCN SERPL QL: NEGATIVE
DEPRECATED RDW RBC AUTO: 88.9 FL (ref 37–54)
EOSINOPHIL # BLD AUTO: 0.09 10*3/MM3 (ref 0–0.4)
EOSINOPHIL NFR BLD AUTO: 1.2 % (ref 0.3–6.2)
ERYTHROCYTE [DISTWIDTH] IN BLOOD BY AUTOMATED COUNT: 26.5 % (ref 12.3–15.4)
HCT VFR BLD AUTO: 23.3 % (ref 37.5–51)
HGB BLD-MCNC: 7.6 G/DL (ref 13–17.7)
HOLD SPECIMEN: NORMAL
LYMPHOCYTES # BLD AUTO: 0.95 10*3/MM3 (ref 0.7–3.1)
LYMPHOCYTES NFR BLD AUTO: 13 % (ref 19.6–45.3)
MCH RBC QN AUTO: 31.8 PG (ref 26.6–33)
MCHC RBC AUTO-ENTMCNC: 32.6 G/DL (ref 31.5–35.7)
MCV RBC AUTO: 97.5 FL (ref 79–97)
MONOCYTES # BLD AUTO: 0.62 10*3/MM3 (ref 0.1–0.9)
MONOCYTES NFR BLD AUTO: 8.5 % (ref 5–12)
NEUTROPHILS # BLD AUTO: 5.48 10*3/MM3 (ref 1.7–7)
NEUTROPHILS NFR BLD AUTO: 75.1 % (ref 42.7–76)
PLATELET # BLD AUTO: 111 10*3/MM3 (ref 140–450)
PMV BLD AUTO: 13.9 FL (ref 6–12)
RBC # BLD AUTO: 2.39 10*6/MM3 (ref 4.14–5.8)
RH BLD: POSITIVE
T&S EXPIRATION DATE: NORMAL
WBC NRBC COR # BLD: 7.3 10*3/MM3 (ref 3.4–10.8)

## 2020-02-14 PROCEDURE — 25010000002 EPOETIN ALFA PER 1000 UNITS: Performed by: INTERNAL MEDICINE

## 2020-02-14 PROCEDURE — 86923 COMPATIBILITY TEST ELECTRIC: CPT

## 2020-02-14 PROCEDURE — 86850 RBC ANTIBODY SCREEN: CPT

## 2020-02-14 PROCEDURE — 85025 COMPLETE CBC W/AUTO DIFF WBC: CPT

## 2020-02-14 PROCEDURE — 36415 COLL VENOUS BLD VENIPUNCTURE: CPT

## 2020-02-14 PROCEDURE — 86900 BLOOD TYPING SEROLOGIC ABO: CPT

## 2020-02-14 PROCEDURE — 96372 THER/PROPH/DIAG INJ SC/IM: CPT

## 2020-02-14 PROCEDURE — 86901 BLOOD TYPING SEROLOGIC RH(D): CPT

## 2020-02-14 RX ORDER — SODIUM CHLORIDE 9 MG/ML
250 INJECTION, SOLUTION INTRAVENOUS AS NEEDED
Status: CANCELLED | OUTPATIENT
Start: 2020-02-14

## 2020-02-14 RX ORDER — ACETAMINOPHEN 325 MG/1
650 TABLET ORAL ONCE
Status: CANCELLED | OUTPATIENT
Start: 2020-02-14 | End: 2020-02-14

## 2020-02-14 RX ORDER — FUROSEMIDE 10 MG/ML
20 INJECTION INTRAMUSCULAR; INTRAVENOUS ONCE
Status: CANCELLED | OUTPATIENT
Start: 2020-02-14 | End: 2020-02-14

## 2020-02-14 RX ORDER — DIPHENHYDRAMINE HYDROCHLORIDE 50 MG/ML
25 INJECTION INTRAMUSCULAR; INTRAVENOUS ONCE
Status: CANCELLED | OUTPATIENT
Start: 2020-02-14 | End: 2020-02-14

## 2020-02-14 RX ADMIN — ERYTHROPOIETIN 40000 UNITS: 40000 INJECTION, SOLUTION INTRAVENOUS; SUBCUTANEOUS at 13:17

## 2020-02-14 NOTE — PROGRESS NOTES
Called and spoke with MarthaRN regarding pt's hgb today, 7.6. She returned call stating dr NICHOLAS wants pt to receive retacrit today and then possibly transfuse 2 units of blood on Monday. Pt and spouse voice understanding.

## 2020-02-14 NOTE — PROGRESS NOTES
Notified Dr. Martinez of HGB 7.6 and HCT 23.3.  Order given for procrit today and to transfuse 2 units PRBC on 2/17/2020.

## 2020-02-17 ENCOUNTER — INFUSION (OUTPATIENT)
Dept: ONCOLOGY | Facility: HOSPITAL | Age: 80
End: 2020-02-17

## 2020-02-17 VITALS
HEART RATE: 76 BPM | RESPIRATION RATE: 18 BRPM | SYSTOLIC BLOOD PRESSURE: 148 MMHG | TEMPERATURE: 97.4 F | OXYGEN SATURATION: 100 % | DIASTOLIC BLOOD PRESSURE: 82 MMHG

## 2020-02-17 DIAGNOSIS — D46.9 MDS (MYELODYSPLASTIC SYNDROME) (HCC): ICD-10-CM

## 2020-02-17 PROCEDURE — 86900 BLOOD TYPING SEROLOGIC ABO: CPT

## 2020-02-17 PROCEDURE — 25010000002 FUROSEMIDE PER 20 MG: Performed by: INTERNAL MEDICINE

## 2020-02-17 PROCEDURE — 25010000002 DIPHENHYDRAMINE PER 50 MG: Performed by: INTERNAL MEDICINE

## 2020-02-17 PROCEDURE — 36430 TRANSFUSION BLD/BLD COMPNT: CPT

## 2020-02-17 PROCEDURE — P9016 RBC LEUKOCYTES REDUCED: HCPCS

## 2020-02-17 PROCEDURE — 96374 THER/PROPH/DIAG INJ IV PUSH: CPT

## 2020-02-17 PROCEDURE — 96375 TX/PRO/DX INJ NEW DRUG ADDON: CPT

## 2020-02-17 RX ORDER — DIPHENHYDRAMINE HYDROCHLORIDE 50 MG/ML
25 INJECTION INTRAMUSCULAR; INTRAVENOUS ONCE
Status: COMPLETED | OUTPATIENT
Start: 2020-02-17 | End: 2020-02-17

## 2020-02-17 RX ORDER — FUROSEMIDE 10 MG/ML
20 INJECTION INTRAMUSCULAR; INTRAVENOUS ONCE
Status: COMPLETED | OUTPATIENT
Start: 2020-02-17 | End: 2020-02-17

## 2020-02-17 RX ORDER — SODIUM CHLORIDE 9 MG/ML
250 INJECTION, SOLUTION INTRAVENOUS AS NEEDED
Status: DISCONTINUED | OUTPATIENT
Start: 2020-02-17 | End: 2020-02-17 | Stop reason: HOSPADM

## 2020-02-17 RX ORDER — ACETAMINOPHEN 325 MG/1
650 TABLET ORAL ONCE
Status: COMPLETED | OUTPATIENT
Start: 2020-02-17 | End: 2020-02-17

## 2020-02-17 RX ORDER — FUROSEMIDE 10 MG/ML
20 INJECTION INTRAMUSCULAR; INTRAVENOUS ONCE
Status: DISCONTINUED | OUTPATIENT
Start: 2020-02-17 | End: 2020-02-17 | Stop reason: HOSPADM

## 2020-02-17 RX ADMIN — ACETAMINOPHEN 650 MG: 325 TABLET, FILM COATED ORAL at 09:20

## 2020-02-17 RX ADMIN — FUROSEMIDE 20 MG: 10 INJECTION, SOLUTION INTRAMUSCULAR; INTRAVENOUS at 14:27

## 2020-02-17 RX ADMIN — DIPHENHYDRAMINE HYDROCHLORIDE 25 MG: 50 INJECTION, SOLUTION INTRAMUSCULAR; INTRAVENOUS at 09:20

## 2020-02-17 RX ADMIN — SODIUM CHLORIDE 250 ML: 9 INJECTION, SOLUTION INTRAVENOUS at 09:19

## 2020-02-21 ENCOUNTER — INFUSION (OUTPATIENT)
Dept: ONCOLOGY | Facility: HOSPITAL | Age: 80
End: 2020-02-21

## 2020-02-21 ENCOUNTER — LAB (OUTPATIENT)
Dept: LAB | Facility: HOSPITAL | Age: 80
End: 2020-02-21

## 2020-02-21 VITALS
DIASTOLIC BLOOD PRESSURE: 71 MMHG | HEART RATE: 91 BPM | BODY MASS INDEX: 21.7 KG/M2 | TEMPERATURE: 97.6 F | SYSTOLIC BLOOD PRESSURE: 137 MMHG | WEIGHT: 155 LBS | HEIGHT: 71 IN

## 2020-02-21 DIAGNOSIS — D46.9 MDS (MYELODYSPLASTIC SYNDROME) (HCC): Primary | ICD-10-CM

## 2020-02-21 LAB
BASOPHILS # BLD AUTO: 0.03 10*3/MM3 (ref 0–0.2)
BASOPHILS NFR BLD AUTO: 0.4 % (ref 0–1.5)
DEPRECATED RDW RBC AUTO: 77.1 FL (ref 37–54)
EOSINOPHIL # BLD AUTO: 0.05 10*3/MM3 (ref 0–0.4)
EOSINOPHIL NFR BLD AUTO: 0.7 % (ref 0.3–6.2)
ERYTHROCYTE [DISTWIDTH] IN BLOOD BY AUTOMATED COUNT: 23 % (ref 12.3–15.4)
HCT VFR BLD AUTO: 29.2 % (ref 37.5–51)
HGB BLD-MCNC: 9.5 G/DL (ref 13–17.7)
HOLD SPECIMEN: NORMAL
LYMPHOCYTES # BLD AUTO: 1.06 10*3/MM3 (ref 0.7–3.1)
LYMPHOCYTES NFR BLD AUTO: 15 % (ref 19.6–45.3)
MCH RBC QN AUTO: 31 PG (ref 26.6–33)
MCHC RBC AUTO-ENTMCNC: 32.5 G/DL (ref 31.5–35.7)
MCV RBC AUTO: 95.4 FL (ref 79–97)
MONOCYTES # BLD AUTO: 0.61 10*3/MM3 (ref 0.1–0.9)
MONOCYTES NFR BLD AUTO: 8.6 % (ref 5–12)
NEUTROPHILS # BLD AUTO: 5.24 10*3/MM3 (ref 1.7–7)
NEUTROPHILS NFR BLD AUTO: 74 % (ref 42.7–76)
PLATELET # BLD AUTO: 97 10*3/MM3 (ref 140–450)
RBC # BLD AUTO: 3.06 10*6/MM3 (ref 4.14–5.8)
WBC NRBC COR # BLD: 7.08 10*3/MM3 (ref 3.4–10.8)

## 2020-02-21 PROCEDURE — 36415 COLL VENOUS BLD VENIPUNCTURE: CPT

## 2020-02-21 PROCEDURE — 25010000002 EPOETIN ALFA-EPBX 40000 UNIT/ML SOLUTION: Performed by: INTERNAL MEDICINE

## 2020-02-21 PROCEDURE — 85025 COMPLETE CBC W/AUTO DIFF WBC: CPT

## 2020-02-21 PROCEDURE — 96372 THER/PROPH/DIAG INJ SC/IM: CPT

## 2020-02-21 RX ADMIN — EPOETIN ALFA-EPBX 40000 UNITS: 40000 INJECTION, SOLUTION INTRAVENOUS; SUBCUTANEOUS at 12:47

## 2020-02-26 ENCOUNTER — TELEPHONE (OUTPATIENT)
Dept: ONCOLOGY | Facility: CLINIC | Age: 80
End: 2020-02-26

## 2020-02-26 NOTE — TELEPHONE ENCOUNTER
"Received call from patient wife, Faith she is tearful today and says she is unsure what she needs to do. She reports that patient, () was up all last nite with c/o pain located in his chest area, unable to sleep, he talks out of his head most of the time, unable to distinguish nite from day, very, very confused. He is weak, constantly has the rigors and urine incontinent. She questions if she should really consider Hospice Services at this time. She says \"I want to do what is right for him\"  "

## 2020-02-28 ENCOUNTER — APPOINTMENT (OUTPATIENT)
Dept: ONCOLOGY | Facility: HOSPITAL | Age: 80
End: 2020-02-28

## 2020-02-28 ENCOUNTER — APPOINTMENT (OUTPATIENT)
Dept: LAB | Facility: HOSPITAL | Age: 80
End: 2020-02-28

## 2020-02-28 DIAGNOSIS — D46.9 MDS (MYELODYSPLASTIC SYNDROME) (HCC): Primary | ICD-10-CM

## 2020-03-06 ENCOUNTER — APPOINTMENT (OUTPATIENT)
Dept: LAB | Facility: HOSPITAL | Age: 80
End: 2020-03-06

## 2020-03-06 ENCOUNTER — APPOINTMENT (OUTPATIENT)
Dept: ONCOLOGY | Facility: HOSPITAL | Age: 80
End: 2020-03-06

## 2020-03-13 ENCOUNTER — APPOINTMENT (OUTPATIENT)
Dept: LAB | Facility: HOSPITAL | Age: 80
End: 2020-03-13

## 2020-03-13 ENCOUNTER — APPOINTMENT (OUTPATIENT)
Dept: ONCOLOGY | Facility: HOSPITAL | Age: 80
End: 2020-03-13

## 2020-03-14 PROBLEM — F41.9 ANXIETY AND DEPRESSION: Status: ACTIVE | Noted: 2020-01-01

## 2020-03-14 PROBLEM — G93.41 METABOLIC ENCEPHALOPATHY: Status: ACTIVE | Noted: 2020-01-01

## 2020-03-14 PROBLEM — F32.A ANXIETY AND DEPRESSION: Status: ACTIVE | Noted: 2020-01-01
